# Patient Record
Sex: FEMALE | Race: WHITE | NOT HISPANIC OR LATINO
[De-identification: names, ages, dates, MRNs, and addresses within clinical notes are randomized per-mention and may not be internally consistent; named-entity substitution may affect disease eponyms.]

---

## 2017-01-04 ENCOUNTER — APPOINTMENT (OUTPATIENT)
Dept: PSYCHIATRY | Facility: CLINIC | Age: 62
End: 2017-01-04

## 2020-04-25 ENCOUNTER — MESSAGE (OUTPATIENT)
Age: 65
End: 2020-04-25

## 2022-06-29 ENCOUNTER — OUTPATIENT (OUTPATIENT)
Dept: OUTPATIENT SERVICES | Facility: HOSPITAL | Age: 67
LOS: 1 days | Discharge: HOME | End: 2022-06-29

## 2022-06-29 DIAGNOSIS — C50.919 MALIGNANT NEOPLASM OF UNSPECIFIED SITE OF UNSPECIFIED FEMALE BREAST: ICD-10-CM

## 2022-06-30 ENCOUNTER — APPOINTMENT (OUTPATIENT)
Dept: SURGICAL ONCOLOGY | Facility: AMBULATORY SURGERY CENTER | Age: 67
End: 2022-06-30

## 2022-06-30 ENCOUNTER — APPOINTMENT (OUTPATIENT)
Dept: HEMATOLOGY ONCOLOGY | Facility: CLINIC | Age: 67
End: 2022-06-30

## 2022-06-30 ENCOUNTER — RESULT REVIEW (OUTPATIENT)
Age: 67
End: 2022-06-30

## 2022-06-30 ENCOUNTER — APPOINTMENT (OUTPATIENT)
Dept: SURGICAL ONCOLOGY | Facility: CLINIC | Age: 67
End: 2022-06-30

## 2022-06-30 VITALS
HEIGHT: 64.37 IN | OXYGEN SATURATION: 98 % | DIASTOLIC BLOOD PRESSURE: 71 MMHG | TEMPERATURE: 97 F | BODY MASS INDEX: 29.66 KG/M2 | RESPIRATION RATE: 18 BRPM | SYSTOLIC BLOOD PRESSURE: 104 MMHG | HEART RATE: 107 BPM | WEIGHT: 173.72 LBS

## 2022-06-30 VITALS
RESPIRATION RATE: 16 BRPM | HEIGHT: 64.37 IN | BODY MASS INDEX: 29.53 KG/M2 | HEART RATE: 94 BPM | TEMPERATURE: 97.8 F | SYSTOLIC BLOOD PRESSURE: 113 MMHG | OXYGEN SATURATION: 98 % | WEIGHT: 173 LBS | DIASTOLIC BLOOD PRESSURE: 77 MMHG

## 2022-06-30 DIAGNOSIS — C50.212 MALIGNANT NEOPLASM OF UPPER-INNER QUADRANT OF LEFT FEMALE BREAST: ICD-10-CM

## 2022-06-30 DIAGNOSIS — Z17.1 MALIGNANT NEOPLASM OF UPPER-INNER QUADRANT OF LEFT FEMALE BREAST: ICD-10-CM

## 2022-06-30 LAB
ALBUMIN SERPL ELPH-MCNC: 4.6 G/DL
ALP BLD-CCNC: 111 U/L
ALT SERPL-CCNC: 12 U/L
ANION GAP SERPL CALC-SCNC: 15 MMOL/L
APTT BLD: 32.4 SEC
AST SERPL-CCNC: 17 U/L
BASOPHILS # BLD AUTO: 0.08 K/UL — SIGNIFICANT CHANGE UP (ref 0–0.2)
BASOPHILS NFR BLD AUTO: 1 % — SIGNIFICANT CHANGE UP (ref 0–2)
BILIRUB SERPL-MCNC: 0.4 MG/DL
BUN SERPL-MCNC: 13 MG/DL
CALCIUM SERPL-MCNC: 9.5 MG/DL
CHLORIDE SERPL-SCNC: 104 MMOL/L
CO2 SERPL-SCNC: 23 MMOL/L
CREAT SERPL-MCNC: 1.06 MG/DL
EGFR: 58 ML/MIN/1.73M2
EOSINOPHIL # BLD AUTO: 0.02 K/UL — SIGNIFICANT CHANGE UP (ref 0–0.5)
EOSINOPHIL NFR BLD AUTO: 0.2 % — SIGNIFICANT CHANGE UP (ref 0–6)
GLUCOSE SERPL-MCNC: 109 MG/DL
HCT VFR BLD CALC: 43.5 % — SIGNIFICANT CHANGE UP (ref 34.5–45)
HGB BLD-MCNC: 13.7 G/DL — SIGNIFICANT CHANGE UP (ref 11.5–15.5)
IMM GRANULOCYTES NFR BLD AUTO: 0.6 % — SIGNIFICANT CHANGE UP (ref 0–1.5)
INR PPP: 1.09 RATIO
LYMPHOCYTES # BLD AUTO: 2.02 K/UL — SIGNIFICANT CHANGE UP (ref 1–3.3)
LYMPHOCYTES # BLD AUTO: 24.7 % — SIGNIFICANT CHANGE UP (ref 13–44)
MCHC RBC-ENTMCNC: 27.3 PG — SIGNIFICANT CHANGE UP (ref 27–34)
MCHC RBC-ENTMCNC: 31.5 G/DL — LOW (ref 32–36)
MCV RBC AUTO: 86.8 FL — SIGNIFICANT CHANGE UP (ref 80–100)
MONOCYTES # BLD AUTO: 0.66 K/UL — SIGNIFICANT CHANGE UP (ref 0–0.9)
MONOCYTES NFR BLD AUTO: 8.1 % — SIGNIFICANT CHANGE UP (ref 2–14)
NEUTROPHILS # BLD AUTO: 5.35 K/UL — SIGNIFICANT CHANGE UP (ref 1.8–7.4)
NEUTROPHILS NFR BLD AUTO: 65.4 % — SIGNIFICANT CHANGE UP (ref 43–77)
NRBC # BLD: 0 /100 WBCS — SIGNIFICANT CHANGE UP (ref 0–0)
PLATELET # BLD AUTO: 340 K/UL — SIGNIFICANT CHANGE UP (ref 150–400)
POTASSIUM SERPL-SCNC: 4.8 MMOL/L
PROT SERPL-MCNC: 6.8 G/DL
PT BLD: 12.8 SEC
RBC # BLD: 5.01 M/UL — SIGNIFICANT CHANGE UP (ref 3.8–5.2)
RBC # FLD: 13.8 % — SIGNIFICANT CHANGE UP (ref 10.3–14.5)
SODIUM SERPL-SCNC: 141 MMOL/L
WBC # BLD: 8.18 K/UL — SIGNIFICANT CHANGE UP (ref 3.8–10.5)
WBC # FLD AUTO: 8.18 K/UL — SIGNIFICANT CHANGE UP (ref 3.8–10.5)

## 2022-06-30 PROCEDURE — 99204 OFFICE O/P NEW MOD 45 MIN: CPT

## 2022-06-30 PROCEDURE — 99205 OFFICE O/P NEW HI 60 MIN: CPT

## 2022-06-30 NOTE — HISTORY OF PRESENT ILLNESS
[Disease: _____________________] : Disease: [unfilled] [de-identified] : Please see dictated initial consult note (*d office visit) in [facility] [de-identified] : ER+ AR+ Her 2neu (2+ IHC) - CISH/FISH not done

## 2022-07-05 ENCOUNTER — APPOINTMENT (OUTPATIENT)
Dept: SURGICAL ONCOLOGY | Facility: CLINIC | Age: 67
End: 2022-07-05

## 2022-07-05 ENCOUNTER — RESULT REVIEW (OUTPATIENT)
Age: 67
End: 2022-07-05

## 2022-07-05 VITALS
HEIGHT: 64.37 IN | RESPIRATION RATE: 16 BRPM | SYSTOLIC BLOOD PRESSURE: 122 MMHG | WEIGHT: 176 LBS | BODY MASS INDEX: 30.05 KG/M2 | TEMPERATURE: 97.7 F | OXYGEN SATURATION: 95 % | HEART RATE: 108 BPM | DIASTOLIC BLOOD PRESSURE: 79 MMHG

## 2022-07-05 PROCEDURE — 99214 OFFICE O/P EST MOD 30 MIN: CPT

## 2022-07-05 NOTE — PHYSICAL EXAM
[Normal] : normal conjunctiva, lids, pupils and irises [FreeTextEntry1] : SC present for exam  [de-identified] : Wound is clean, packing removed. Packing no longer needed. Covered with bandage [de-identified] : left breast wound is clean, packing removed.

## 2022-07-05 NOTE — ASSESSMENT
[FreeTextEntry1] : IMP:\par Ulcerated left breast invasive ductal carcinoma\par \par \par PLAN:\par PET/CT and chemotherapy per Dr. Fitzgerald\par RTO in 2 weeks\par \par \par All medical entries were at my, Dr. Renan Phillips, direction.  I have reviewed the chart and agree that the record accurately reflects my personal performance of the history, physical exam, assessment and plan.  Our office nurse practitioner was present for the duration of the office visit.

## 2022-07-05 NOTE — HISTORY OF PRESENT ILLNESS
[de-identified] : 67 year female returns for follow up.  She was initially seen in consultation on 22.  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  She noticed a mass with skin change involving her left breast but initially felt it was an abscess so she did not seek care.  She showed it to a friend who was concerned that it was cancer.  Patient admits that she avoids doctors because her   from pancreatic cancer and she felt he was mistreated by physicians involved in his care, and was thus afraid she would have a similar experience.\par \par She ultimately sought care and had imaging in [place] (reports not currently available), followed by an incisional type biopsy demonstrating a 4 cm invasive ductal carcinoma, with angiolymphatic invasion and extending to the peripheral and deep margins, ER+/DE+/HER2-.\par \par She returned to [place] for treatment and was seen by medical oncologist Dr. Silver Fitzgerald earlier today.  He plans to send her for a PET/CT.  During the visit, he was concerned about bleeding from the site and referred her to me for management.\par \par She denies any significant medical history but suffers from anxiety/depression and is on multiple psychiatric medications.  Her son  a few years ago as well.   She has a family history of breast cancer involving her mother.  She denies alcohol or tobacco use. \par \par 22- Wound bed clean.  Packing replaced. \par \par 2022- wound bed clean. Packing is no longer needed. Wound is covered w/ bandage\par

## 2022-07-06 ENCOUNTER — APPOINTMENT (OUTPATIENT)
Dept: NUCLEAR MEDICINE | Facility: IMAGING CENTER | Age: 67
End: 2022-07-06

## 2022-07-06 LAB — SURGICAL PATHOLOGY STUDY: SIGNIFICANT CHANGE UP

## 2022-07-11 ENCOUNTER — APPOINTMENT (OUTPATIENT)
Dept: NUCLEAR MEDICINE | Facility: IMAGING CENTER | Age: 67
End: 2022-07-11

## 2022-07-11 ENCOUNTER — OUTPATIENT (OUTPATIENT)
Dept: OUTPATIENT SERVICES | Facility: HOSPITAL | Age: 67
LOS: 1 days | End: 2022-07-11
Payer: MEDICARE

## 2022-07-11 DIAGNOSIS — C50.912 MALIGNANT NEOPLASM OF UNSPECIFIED SITE OF LEFT FEMALE BREAST: ICD-10-CM

## 2022-07-11 PROCEDURE — A9552: CPT

## 2022-07-11 PROCEDURE — 78815 PET IMAGE W/CT SKULL-THIGH: CPT

## 2022-07-11 PROCEDURE — 78815 PET IMAGE W/CT SKULL-THIGH: CPT | Mod: 26,PI,MH

## 2022-07-11 NOTE — ASSESSMENT
[FreeTextEntry1] : IMP:\par Ulcerated left breast invasive ductal carcinoma\par Wound bed clean.  It was repacked today \par \par PLAN:\par PET/CT and chemotherapy per Dr. Fitzgerald\par Patient should RTO next week for dressing change\par \par All medical entries were at my, Dr. Renan Phillips, direction.  I have reviewed the chart and agree that the record accurately reflects my personal performance of the history, physical exam, assessment and plan.  Our office nurse practitioner was present for the duration of the office visit.

## 2022-07-11 NOTE — PHYSICAL EXAM
[Normal] : supple, no neck mass and thyroid not enlarged [Normal] : oriented to person, place and time, with appropriate affect [FreeTextEntry1] : AB present during exam  [de-identified] : Normal S1, S2.  Regular rate and rhythm.  [de-identified] : Left breast ulcerated lesion approximately 5 cm at the base and 3 cm at the superficial extent with granulation tissue, no drainage, at the 11:00 axis

## 2022-07-11 NOTE — HISTORY OF PRESENT ILLNESS
[de-identified] : 67 year female presents for an initial consultation.  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  She noticed a mass with skin change involving her left breast but initially felt it was an abscess so she did not seek care.  She showed it to a friend who was concerned that it was cancer.  Patient admits that she avoids doctors because her   from pancreatic cancer and she felt he was mistreated by physicians involved in his care, and was thus afraid she would have a similar experience.\par \par She ultimately sought care and had imaging in [place] (reports not currently available), followed by an incisional type biopsy demonstrating a 4 cm invasive ductal carcinoma, with angiolymphatic invasion and extending to the peripheral and deep margins, ER+/OK+/HER2-.\par \par She returned to [place] for treatment and was seen by medical oncologist Dr. Silver Fitzgerald earlier today.  He plans to send her for a PET/CT.  During the visit, he was concerned about bleeding from the site and referred her to me for management.\par \par She denies any significant medical history but suffers from anxiety/depression and is on multiple psychiatric medications.  Her son  a few years ago as well.   She has a family history of breast cancer involving her mother.  She denies alcohol or tobacco use. \par

## 2022-07-13 ENCOUNTER — TRANSCRIPTION ENCOUNTER (OUTPATIENT)
Age: 67
End: 2022-07-13

## 2022-07-14 ENCOUNTER — NON-APPOINTMENT (OUTPATIENT)
Age: 67
End: 2022-07-14

## 2022-07-18 ENCOUNTER — NON-APPOINTMENT (OUTPATIENT)
Age: 67
End: 2022-07-18

## 2022-07-19 ENCOUNTER — APPOINTMENT (OUTPATIENT)
Dept: SURGICAL ONCOLOGY | Facility: CLINIC | Age: 67
End: 2022-07-19

## 2022-07-19 ENCOUNTER — NON-APPOINTMENT (OUTPATIENT)
Age: 67
End: 2022-07-19

## 2022-07-19 VITALS
SYSTOLIC BLOOD PRESSURE: 115 MMHG | HEART RATE: 103 BPM | OXYGEN SATURATION: 98 % | RESPIRATION RATE: 17 BRPM | BODY MASS INDEX: 29.37 KG/M2 | DIASTOLIC BLOOD PRESSURE: 79 MMHG | HEIGHT: 64 IN | TEMPERATURE: 96.9 F | WEIGHT: 172 LBS

## 2022-07-19 PROCEDURE — 99214 OFFICE O/P EST MOD 30 MIN: CPT

## 2022-07-19 NOTE — HISTORY OF PRESENT ILLNESS
[de-identified] : Ms. Jessenia Adler is a 67 year female returns for follow up.  She was initially seen in consultation on 22.  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  She noticed a mass with skin change involving her left breast but initially felt it was an abscess so she did not seek care.  She showed it to a friend who was concerned that it was cancer.  Patient admits that she avoids doctors because her   from pancreatic cancer and she felt he was mistreated by physicians involved in his care, and was thus afraid she would have a similar experience.\par \par She ultimately sought care and had imaging in [place] (reports not currently available), followed by an incisional type biopsy demonstrating a 4 cm invasive ductal carcinoma, with angiolymphatic invasion and extending to the peripheral and deep margins, ER+/MA+/HER2-.\par \par She returned to [place] for treatment and was seen by medical oncologist Dr. Silver Fitzgerald earlier today.  He plans to send her for a PET/CT.  During the visit, he was concerned about bleeding from the site and referred her to me for management.\par \par She denies any significant medical history but suffers from anxiety/depression and is on multiple psychiatric medications.  Her son  a few years ago as well.   She has a family history of breast cancer involving her mother.  She denies alcohol or tobacco use. \par \par 22- Wound bed clean.  Packing replaced. \par \par 2022- wound bed clean. Packing is no longer needed. Wound is covered w/ bandage\par \par 22- PET SCAN: FDG-avid left breast masses, medial aspect upper left breast, and central aspect left breast, are compatible with malignancy. The mass in the medial aspect of the upper left breast involves the overlying skin and is inseparable from the chest wall musculature. The mass in the central left breast is not well delineated on CT. Further evaluation may be performed with MRI.\par FDG-avid left axillary lymph node metastases.\par FDG-avid mixed lytic and sclerotic bone metastases in axial skeleton and proximal right femur.\par Large hiatal hernia with distended esophagus, without associated hypermetabolism. Please correlate clinically.\par \par 2022- She is scheduled for a CT guided bone biopsy on 22.  She denies any significant bleeding or drainage from the tumor site.\par

## 2022-07-19 NOTE — ASSESSMENT
[FreeTextEntry1] : IMP:\par Ulcerated left breast invasive ductal carcinoma\par Probable bone mets\par \par \par PLAN:\par Continue local wound care\par CT guided bone biopsy 7/22/22\par Follow up with Dr. Fitzgerald for systemic therapy\par If there is determined to be a role for surgery I will be available\par \par \par All medical entries were at my, Dr. Renan Phillips, direction.  I have reviewed the chart and agree that the record accurately reflects my personal performance of the history, physical exam, assessment and plan.  Our office nurse practitioner was present for the duration of the office visit.

## 2022-07-19 NOTE — PHYSICAL EXAM
[Normal Supraclavicular Lymph Nodes] : normal supraclavicular lymph nodes [Normal] : oriented to person, place and time, with appropriate affect [FreeTextEntry1] : AB present for exam  [de-identified] : left breast wound is clean, packing removed. [de-identified] : Wound is clean, packing removed. Packing no longer needed. Covered with bandage

## 2022-07-20 ENCOUNTER — APPOINTMENT (OUTPATIENT)
Dept: INTERVENTIONAL RADIOLOGY/VASCULAR | Facility: CLINIC | Age: 67
End: 2022-07-20

## 2022-07-20 DIAGNOSIS — Z63.4 DISAPPEARANCE AND DEATH OF FAMILY MEMBER: ICD-10-CM

## 2022-07-20 PROCEDURE — 99443: CPT | Mod: 95

## 2022-07-20 SDOH — SOCIAL STABILITY - SOCIAL INSECURITY: DISSAPEARANCE AND DEATH OF FAMILY MEMBER: Z63.4

## 2022-07-20 NOTE — CONSULT LETTER
[Dear  ___] : Dear  [unfilled], [Consult Letter:] : I had the pleasure of evaluating your patient, [unfilled]. [( Thank you for referring [unfilled] for consultation for _____ )] : Thank you for referring [unfilled] for consultation for [unfilled] [Please see my note below.] : Please see my note below. [Consult Closing:] : Thank you very much for allowing me to participate in the care of this patient.  If you have any questions, please do not hesitate to contact me. [Sincerely,] : Sincerely, [FreeTextEntry3] : Lan Szymanski MD, MS\par Vascular & Interventional Radiologist\par [facility]\par

## 2022-07-20 NOTE — REVIEW OF SYSTEMS
[As Noted in HPI] : as noted in HPI [Anxiety] : anxiety [Pelvic Pain] : no pelvic pain [Easy Bleeding] : no tendency for easy bleeding [Easy Bruising] : no tendency for easy bruising

## 2022-07-20 NOTE — HISTORY OF PRESENT ILLNESS
[FreeTextEntry1] : 67-year-old female with PMH significant for left breast cancer, who was found to have left axillary LN metastases as well as osseous metastases on a recent PET-CT. Patient was evaluated by Dr. Fitzgerald, who referred the patient for consultation for possible biopsy of osseous lesion.\par \par Patient does not offer any complaints currently. Denies CP/SOB, abd pain/N/V/changes in bowel habits, fevers/chills/night sweats. Patient denies recent unexpected weight loss.

## 2022-07-20 NOTE — PHYSICAL EXAM
[Alert] : alert [No Acute Distress] : no acute distress [Oriented x3] : oriented to person, place, and time

## 2022-07-20 NOTE — REASON FOR VISIT
[Medical Office: ([facility])___] : at the medical office located in  [Verbal consent obtained from patient] : the patient, [unfilled] [Consultation] : a consultation visit [FreeTextEntry1] : Biopsy of right posterior iliac bone lesion

## 2022-07-20 NOTE — ASSESSMENT
[Other: _____] : [unfilled] [FreeTextEntry1] : 66 yo F with PMH significant for left breast cancer, who was found to have osseous lesions concerning for metastases on a recent PET-CT. Patient was evaluated by Dr. Fitzgerald, who referred the patient for consultation for possible biopsy of osseous lesion.\par \par The procedure (CT guided biopsy of right posterior iliac bone lesion) was discussed with the patient in detail. The benefits, alternatives and risks of the procedure, including but not limited to risks of bleeding and infection were also discussed. All questions were answered and concerns addressed to patient's satisfaction. The patient verbalizes understanding and is agreeable to the procedure.\par \par

## 2022-07-22 ENCOUNTER — RESULT REVIEW (OUTPATIENT)
Age: 67
End: 2022-07-22

## 2022-07-22 ENCOUNTER — OUTPATIENT (OUTPATIENT)
Dept: OUTPATIENT SERVICES | Facility: HOSPITAL | Age: 67
LOS: 1 days | End: 2022-07-22
Payer: MEDICARE

## 2022-07-22 ENCOUNTER — APPOINTMENT (OUTPATIENT)
Dept: INTERVENTIONAL RADIOLOGY/VASCULAR | Facility: HOSPITAL | Age: 67
End: 2022-07-22

## 2022-07-22 ENCOUNTER — TRANSCRIPTION ENCOUNTER (OUTPATIENT)
Age: 67
End: 2022-07-22

## 2022-07-22 VITALS
DIASTOLIC BLOOD PRESSURE: 74 MMHG | RESPIRATION RATE: 16 BRPM | OXYGEN SATURATION: 95 % | SYSTOLIC BLOOD PRESSURE: 118 MMHG | TEMPERATURE: 98 F | HEART RATE: 85 BPM

## 2022-07-22 VITALS
DIASTOLIC BLOOD PRESSURE: 42 MMHG | OXYGEN SATURATION: 97 % | TEMPERATURE: 98 F | SYSTOLIC BLOOD PRESSURE: 116 MMHG | HEART RATE: 85 BPM | RESPIRATION RATE: 12 BRPM

## 2022-07-22 DIAGNOSIS — C50.919 MALIGNANT NEOPLASM OF UNSPECIFIED SITE OF UNSPECIFIED FEMALE BREAST: ICD-10-CM

## 2022-07-22 PROCEDURE — 88311 DECALCIFY TISSUE: CPT

## 2022-07-22 PROCEDURE — 20225 BONE BIOPSY TROCAR/NDL DEEP: CPT

## 2022-07-22 PROCEDURE — 88305 TISSUE EXAM BY PATHOLOGIST: CPT | Mod: 26

## 2022-07-22 PROCEDURE — 77012 CT SCAN FOR NEEDLE BIOPSY: CPT | Mod: 26

## 2022-07-22 PROCEDURE — 77012 CT SCAN FOR NEEDLE BIOPSY: CPT

## 2022-07-22 PROCEDURE — 88311 DECALCIFY TISSUE: CPT | Mod: 26

## 2022-07-22 PROCEDURE — 88305 TISSUE EXAM BY PATHOLOGIST: CPT

## 2022-07-22 RX ORDER — BUPROPION HYDROCHLORIDE 150 MG/1
1 TABLET, EXTENDED RELEASE ORAL
Qty: 0 | Refills: 0 | DISCHARGE

## 2022-07-22 RX ORDER — CLONAZEPAM 1 MG
0 TABLET ORAL
Qty: 0 | Refills: 0 | DISCHARGE

## 2022-07-22 RX ORDER — FENTANYL CITRATE 50 UG/ML
50 INJECTION INTRAVENOUS
Refills: 0 | Status: DISCONTINUED | OUTPATIENT
Start: 2022-07-22 | End: 2022-07-22

## 2022-07-22 RX ORDER — METOCLOPRAMIDE HCL 10 MG
10 TABLET ORAL ONCE
Refills: 0 | Status: DISCONTINUED | OUTPATIENT
Start: 2022-07-22 | End: 2022-07-22

## 2022-07-22 RX ORDER — SODIUM CHLORIDE 9 MG/ML
1000 INJECTION, SOLUTION INTRAVENOUS
Refills: 0 | Status: DISCONTINUED | OUTPATIENT
Start: 2022-07-22 | End: 2022-07-22

## 2022-07-25 ENCOUNTER — APPOINTMENT (OUTPATIENT)
Dept: HEMATOLOGY ONCOLOGY | Facility: CLINIC | Age: 67
End: 2022-07-25

## 2022-07-25 LAB — SARS-COV-2 N GENE NPH QL NAA+PROBE: NOT DETECTED

## 2022-07-26 ENCOUNTER — APPOINTMENT (OUTPATIENT)
Dept: HEMATOLOGY ONCOLOGY | Facility: CLINIC | Age: 67
End: 2022-07-26

## 2022-07-26 ENCOUNTER — APPOINTMENT (OUTPATIENT)
Dept: INTERVENTIONAL RADIOLOGY/VASCULAR | Facility: CLINIC | Age: 67
End: 2022-07-26

## 2022-07-26 ENCOUNTER — RESULT REVIEW (OUTPATIENT)
Age: 67
End: 2022-07-26

## 2022-07-26 ENCOUNTER — NON-APPOINTMENT (OUTPATIENT)
Age: 67
End: 2022-07-26

## 2022-07-26 LAB
ALBUMIN SERPL ELPH-MCNC: 4.3 G/DL
ALP BLD-CCNC: 98 U/L
ALT SERPL-CCNC: 12 U/L
ANION GAP SERPL CALC-SCNC: 12 MMOL/L
AST SERPL-CCNC: 20 U/L
BASOPHILS # BLD AUTO: 0.04 K/UL — SIGNIFICANT CHANGE UP (ref 0–0.2)
BASOPHILS NFR BLD AUTO: 0.7 % — SIGNIFICANT CHANGE UP (ref 0–2)
BILIRUB SERPL-MCNC: 0.4 MG/DL
BUN SERPL-MCNC: 18 MG/DL
CALCIUM SERPL-MCNC: 9.8 MG/DL
CEA SERPL-MCNC: 70.7 NG/ML
CHLORIDE SERPL-SCNC: 104 MMOL/L
CO2 SERPL-SCNC: 26 MMOL/L
CREAT SERPL-MCNC: 0.92 MG/DL
EGFR: 69 ML/MIN/1.73M2
EOSINOPHIL # BLD AUTO: 0.01 K/UL — SIGNIFICANT CHANGE UP (ref 0–0.5)
EOSINOPHIL NFR BLD AUTO: 0.2 % — SIGNIFICANT CHANGE UP (ref 0–6)
GLUCOSE SERPL-MCNC: 100 MG/DL
HCT VFR BLD CALC: 41.7 % — SIGNIFICANT CHANGE UP (ref 34.5–45)
HGB BLD-MCNC: 13.4 G/DL — SIGNIFICANT CHANGE UP (ref 11.5–15.5)
IMM GRANULOCYTES NFR BLD AUTO: 0.3 % — SIGNIFICANT CHANGE UP (ref 0–1.5)
LYMPHOCYTES # BLD AUTO: 2.2 K/UL — SIGNIFICANT CHANGE UP (ref 1–3.3)
LYMPHOCYTES # BLD AUTO: 36.2 % — SIGNIFICANT CHANGE UP (ref 13–44)
MCHC RBC-ENTMCNC: 27.3 PG — SIGNIFICANT CHANGE UP (ref 27–34)
MCHC RBC-ENTMCNC: 32.1 G/DL — SIGNIFICANT CHANGE UP (ref 32–36)
MCV RBC AUTO: 85.1 FL — SIGNIFICANT CHANGE UP (ref 80–100)
MONOCYTES # BLD AUTO: 0.48 K/UL — SIGNIFICANT CHANGE UP (ref 0–0.9)
MONOCYTES NFR BLD AUTO: 7.9 % — SIGNIFICANT CHANGE UP (ref 2–14)
NEUTROPHILS # BLD AUTO: 3.32 K/UL — SIGNIFICANT CHANGE UP (ref 1.8–7.4)
NEUTROPHILS NFR BLD AUTO: 54.7 % — SIGNIFICANT CHANGE UP (ref 43–77)
NRBC # BLD: 0 /100 WBCS — SIGNIFICANT CHANGE UP (ref 0–0)
PLATELET # BLD AUTO: 300 K/UL — SIGNIFICANT CHANGE UP (ref 150–400)
POTASSIUM SERPL-SCNC: 5.1 MMOL/L
PROT SERPL-MCNC: 6.5 G/DL
RBC # BLD: 4.9 M/UL — SIGNIFICANT CHANGE UP (ref 3.8–5.2)
RBC # FLD: 13.9 % — SIGNIFICANT CHANGE UP (ref 10.3–14.5)
SODIUM SERPL-SCNC: 143 MMOL/L
WBC # BLD: 6.07 K/UL — SIGNIFICANT CHANGE UP (ref 3.8–10.5)
WBC # FLD AUTO: 6.07 K/UL — SIGNIFICANT CHANGE UP (ref 3.8–10.5)

## 2022-07-28 LAB — CANCER AG27-29 SERPL-ACNC: 144.5 U/ML

## 2022-08-08 ENCOUNTER — NON-APPOINTMENT (OUTPATIENT)
Age: 67
End: 2022-08-08

## 2022-08-11 ENCOUNTER — RESULT REVIEW (OUTPATIENT)
Age: 67
End: 2022-08-11

## 2022-08-11 ENCOUNTER — APPOINTMENT (OUTPATIENT)
Dept: HEMATOLOGY ONCOLOGY | Facility: CLINIC | Age: 67
End: 2022-08-11

## 2022-08-11 VITALS
RESPIRATION RATE: 17 BRPM | WEIGHT: 174.8 LBS | DIASTOLIC BLOOD PRESSURE: 78 MMHG | BODY MASS INDEX: 29.84 KG/M2 | TEMPERATURE: 97 F | HEART RATE: 122 BPM | HEIGHT: 64 IN | SYSTOLIC BLOOD PRESSURE: 110 MMHG | OXYGEN SATURATION: 98 %

## 2022-08-11 LAB
BASOPHILS # BLD AUTO: 0.01 K/UL — SIGNIFICANT CHANGE UP (ref 0–0.2)
BASOPHILS NFR BLD AUTO: 0.3 % — SIGNIFICANT CHANGE UP (ref 0–2)
EOSINOPHIL # BLD AUTO: 0.01 K/UL — SIGNIFICANT CHANGE UP (ref 0–0.5)
EOSINOPHIL NFR BLD AUTO: 0.3 % — SIGNIFICANT CHANGE UP (ref 0–6)
HCT VFR BLD CALC: 40.2 % — SIGNIFICANT CHANGE UP (ref 34.5–45)
HGB BLD-MCNC: 12.7 G/DL — SIGNIFICANT CHANGE UP (ref 11.5–15.5)
IMM GRANULOCYTES NFR BLD AUTO: 0 % — SIGNIFICANT CHANGE UP (ref 0–1.5)
LYMPHOCYTES # BLD AUTO: 1.26 K/UL — SIGNIFICANT CHANGE UP (ref 1–3.3)
LYMPHOCYTES # BLD AUTO: 37.4 % — SIGNIFICANT CHANGE UP (ref 13–44)
MCHC RBC-ENTMCNC: 28.1 PG — SIGNIFICANT CHANGE UP (ref 27–34)
MCHC RBC-ENTMCNC: 31.6 G/DL — LOW (ref 32–36)
MCV RBC AUTO: 88.9 FL — SIGNIFICANT CHANGE UP (ref 80–100)
MONOCYTES # BLD AUTO: 0.25 K/UL — SIGNIFICANT CHANGE UP (ref 0–0.9)
MONOCYTES NFR BLD AUTO: 7.4 % — SIGNIFICANT CHANGE UP (ref 2–14)
NEUTROPHILS # BLD AUTO: 1.84 K/UL — SIGNIFICANT CHANGE UP (ref 1.8–7.4)
NEUTROPHILS NFR BLD AUTO: 54.6 % — SIGNIFICANT CHANGE UP (ref 43–77)
NRBC # BLD: 0 /100 WBCS — SIGNIFICANT CHANGE UP (ref 0–0)
PLATELET # BLD AUTO: 159 K/UL — SIGNIFICANT CHANGE UP (ref 150–400)
RBC # BLD: 4.52 M/UL — SIGNIFICANT CHANGE UP (ref 3.8–5.2)
RBC # FLD: 14.6 % — HIGH (ref 10.3–14.5)
WBC # BLD: 3.37 K/UL — LOW (ref 3.8–10.5)
WBC # FLD AUTO: 3.37 K/UL — LOW (ref 3.8–10.5)

## 2022-08-11 PROCEDURE — 99215 OFFICE O/P EST HI 40 MIN: CPT

## 2022-08-11 RX ORDER — VENLAFAXINE HYDROCHLORIDE 75 MG/1
75 CAPSULE, EXTENDED RELEASE ORAL
Qty: 60 | Refills: 0 | Status: COMPLETED | COMMUNITY
Start: 2022-02-23

## 2022-08-11 RX ORDER — FLUOXETINE HYDROCHLORIDE 20 MG/1
20 CAPSULE ORAL
Qty: 30 | Refills: 0 | Status: DISCONTINUED | COMMUNITY
Start: 2022-04-18

## 2022-08-11 RX ORDER — DOXEPIN HYDROCHLORIDE 25 MG/1
25 CAPSULE ORAL
Qty: 30 | Refills: 0 | Status: DISCONTINUED | COMMUNITY
Start: 2022-03-22

## 2022-08-11 RX ORDER — MIRTAZAPINE 30 MG/1
30 TABLET, FILM COATED ORAL
Qty: 30 | Refills: 0 | Status: DISCONTINUED | COMMUNITY
Start: 2022-02-23

## 2022-08-11 RX ORDER — CHLORHEXIDINE GLUCONATE, 0.12% ORAL RINSE 1.2 MG/ML
0.12 SOLUTION DENTAL
Qty: 473 | Refills: 0 | Status: DISCONTINUED | COMMUNITY
Start: 2022-06-02

## 2022-08-11 RX ORDER — ALPRAZOLAM 0.5 MG/1
0.5 TABLET ORAL
Qty: 1 | Refills: 0 | Status: DISCONTINUED | COMMUNITY
Start: 2022-07-05 | End: 2022-08-11

## 2022-08-11 RX ORDER — HYDROCODONE BITARTRATE AND ACETAMINOPHEN 5; 325 MG/1; MG/1
5-325 TABLET ORAL
Qty: 15 | Refills: 0 | Status: DISCONTINUED | COMMUNITY
Start: 2022-06-21

## 2022-08-11 RX ORDER — TRAZODONE HYDROCHLORIDE 150 MG/1
150 TABLET ORAL
Qty: 45 | Refills: 0 | Status: DISCONTINUED | COMMUNITY
Start: 2022-01-12

## 2022-08-11 RX ORDER — AMOXICILLIN AND CLAVULANATE POTASSIUM 875; 125 MG/1; MG/1
875-125 TABLET, COATED ORAL
Qty: 14 | Refills: 0 | Status: COMPLETED | COMMUNITY
Start: 2022-04-20

## 2022-08-11 RX ORDER — ARIPIPRAZOLE 2 MG/1
2 TABLET ORAL
Qty: 30 | Refills: 0 | Status: DISCONTINUED | COMMUNITY
Start: 2022-07-14

## 2022-08-11 RX ORDER — ARIPIPRAZOLE 5 MG/1
5 TABLET ORAL
Qty: 30 | Refills: 0 | Status: DISCONTINUED | COMMUNITY
Start: 2022-01-12

## 2022-08-11 RX ORDER — ZOLPIDEM TARTRATE 5 MG/1
5 TABLET ORAL
Qty: 10 | Refills: 0 | Status: ACTIVE | COMMUNITY
Start: 2022-04-18

## 2022-08-11 RX ORDER — CLINDAMYCIN HYDROCHLORIDE 300 MG/1
300 CAPSULE ORAL
Qty: 40 | Refills: 0 | Status: DISCONTINUED | COMMUNITY
Start: 2022-06-02

## 2022-08-11 NOTE — HISTORY OF PRESENT ILLNESS
[de-identified] : See consult dictated note for complete history.\par \par In the interim the patient had PET/CT with showed metastatic disease of the bones.  The patient was started on Anastrozole and palbociclib on 7/25/22. [de-identified] : The patient presents for follow up.  Started anastrozole and palbociclib on 7/25/22.\par \par Currently on day #15, however she missed yesterday's dose because she thought she had to start her off week.  \par \par She denies any treatment related side effects. \par \par She has been on a strict diet, cutting out sugar, caffeine, meats and dairy because she read on internet that these cause cancer.   She spoke to nutritionist and she was advised on proper diet.  She is still hesitant.  \par \par The patient has history of depression, which she is followed by a psychiatrist who manages her medications.  She is also experiencing anxiety regarding her diagnosis. \par \par She c/o occasional bilateral hip pain when getting up after sitting long periods of time.  Pain resolves after moving.\par \par She denies any other complaints.  She notes a good appetite, patient states she is hungry because not eating, stable weight and excellent performance status. \par \par She will be moving in with her niece who lives locally.  She plans on going back to [place] one weekend a month to visit family and her dog.  \par \par PET/CT scan, 7/11/22- IMPRESSION: Abnormal FDG-PET/CT scan.\par \par 1. FDG-avid left breast masses, medial aspect upper left breast, and central aspect left breast, are compatible with malignancy. The mass in the medial aspect of the upper left breast involves the overlying skin and is inseparable from the chest wall musculature. The mass in the central left breast is not well delineated on CT. Further evaluation may be performed with MRI.\par 2. FDG-avid left axillary lymph node metastases.\par 3. FDG-avid mixed lytic and sclerotic bone metastases in axial skeleton and proximal right femur.\par 4. Large hiatal hernia with distended esophagus, without associated hypermetabolism. Please correlate clinically.\par \par

## 2022-08-11 NOTE — PHYSICAL EXAM
[Normal] : affect appropriate [de-identified] : Left breast with fungating lesion upper central breast with about 3.5 cm x 2.5 cm opening.  Area was cleaned and clean gauze applied. Palpable matted left LAD.  Right breast with no discrete palpable axillary nodes, no palpable axillary nodes. [de-identified] : teary at times.

## 2022-08-12 LAB
ALBUMIN SERPL ELPH-MCNC: 4.2 G/DL
ALP BLD-CCNC: 88 U/L
ALT SERPL-CCNC: 22 U/L
ANION GAP SERPL CALC-SCNC: 10 MMOL/L
AST SERPL-CCNC: 17 U/L
BILIRUB SERPL-MCNC: 0.3 MG/DL
BUN SERPL-MCNC: 13 MG/DL
CALCIUM SERPL-MCNC: 9.1 MG/DL
CHLORIDE SERPL-SCNC: 107 MMOL/L
CO2 SERPL-SCNC: 26 MMOL/L
CREAT SERPL-MCNC: 0.77 MG/DL
EGFR: 85 ML/MIN/1.73M2
GLUCOSE SERPL-MCNC: 86 MG/DL
POTASSIUM SERPL-SCNC: 4.8 MMOL/L
PROT SERPL-MCNC: 6.2 G/DL
SODIUM SERPL-SCNC: 142 MMOL/L

## 2022-08-22 ENCOUNTER — NON-APPOINTMENT (OUTPATIENT)
Age: 67
End: 2022-08-22

## 2022-08-23 ENCOUNTER — RESULT REVIEW (OUTPATIENT)
Age: 67
End: 2022-08-23

## 2022-08-23 ENCOUNTER — APPOINTMENT (OUTPATIENT)
Dept: WOUND CARE | Facility: CLINIC | Age: 67
End: 2022-08-23

## 2022-08-23 ENCOUNTER — APPOINTMENT (OUTPATIENT)
Dept: HEMATOLOGY ONCOLOGY | Facility: CLINIC | Age: 67
End: 2022-08-23

## 2022-08-23 VITALS
SYSTOLIC BLOOD PRESSURE: 112 MMHG | RESPIRATION RATE: 16 BRPM | OXYGEN SATURATION: 95 % | DIASTOLIC BLOOD PRESSURE: 77 MMHG | HEIGHT: 64 IN | TEMPERATURE: 97 F | BODY MASS INDEX: 29.36 KG/M2 | WEIGHT: 171.96 LBS | HEART RATE: 139 BPM

## 2022-08-23 VITALS — BODY MASS INDEX: 29.52 KG/M2 | HEIGHT: 64 IN

## 2022-08-23 DIAGNOSIS — M89.8X9 OTHER SPECIFIED DISORDERS OF BONE, UNSPECIFIED SITE: ICD-10-CM

## 2022-08-23 DIAGNOSIS — Z83.49 FAMILY HISTORY OF OTHER ENDOCRINE, NUTRITIONAL AND METABOLIC DISEASES: ICD-10-CM

## 2022-08-23 DIAGNOSIS — F43.21 ADJUSTMENT DISORDER WITH DEPRESSED MOOD: ICD-10-CM

## 2022-08-23 DIAGNOSIS — H26.9 UNSPECIFIED CATARACT: ICD-10-CM

## 2022-08-23 LAB
BASOPHILS # BLD AUTO: 0.02 K/UL — SIGNIFICANT CHANGE UP (ref 0–0.2)
BASOPHILS NFR BLD AUTO: 0.5 % — SIGNIFICANT CHANGE UP (ref 0–2)
EOSINOPHIL # BLD AUTO: 0.01 K/UL — SIGNIFICANT CHANGE UP (ref 0–0.5)
EOSINOPHIL NFR BLD AUTO: 0.3 % — SIGNIFICANT CHANGE UP (ref 0–6)
HCT VFR BLD CALC: 40.8 % — SIGNIFICANT CHANGE UP (ref 34.5–45)
HGB BLD-MCNC: 13.1 G/DL — SIGNIFICANT CHANGE UP (ref 11.5–15.5)
IMM GRANULOCYTES NFR BLD AUTO: 0 % — SIGNIFICANT CHANGE UP (ref 0–1.5)
LYMPHOCYTES # BLD AUTO: 1.81 K/UL — SIGNIFICANT CHANGE UP (ref 1–3.3)
LYMPHOCYTES # BLD AUTO: 46.9 % — HIGH (ref 13–44)
MCHC RBC-ENTMCNC: 29.3 PG — SIGNIFICANT CHANGE UP (ref 27–34)
MCHC RBC-ENTMCNC: 32.1 G/DL — SIGNIFICANT CHANGE UP (ref 32–36)
MCV RBC AUTO: 91.3 FL — SIGNIFICANT CHANGE UP (ref 80–100)
MONOCYTES # BLD AUTO: 0.46 K/UL — SIGNIFICANT CHANGE UP (ref 0–0.9)
MONOCYTES NFR BLD AUTO: 11.9 % — SIGNIFICANT CHANGE UP (ref 2–14)
NEUTROPHILS # BLD AUTO: 1.56 K/UL — LOW (ref 1.8–7.4)
NEUTROPHILS NFR BLD AUTO: 40.4 % — LOW (ref 43–77)
NRBC # BLD: 0 /100 WBCS — SIGNIFICANT CHANGE UP (ref 0–0)
PLATELET # BLD AUTO: 299 K/UL — SIGNIFICANT CHANGE UP (ref 150–400)
RBC # BLD: 4.47 M/UL — SIGNIFICANT CHANGE UP (ref 3.8–5.2)
RBC # FLD: 16.8 % — HIGH (ref 10.3–14.5)
WBC # BLD: 3.86 K/UL — SIGNIFICANT CHANGE UP (ref 3.8–10.5)
WBC # FLD AUTO: 3.86 K/UL — SIGNIFICANT CHANGE UP (ref 3.8–10.5)

## 2022-08-23 PROCEDURE — 99214 OFFICE O/P EST MOD 30 MIN: CPT

## 2022-08-23 PROCEDURE — 99204 OFFICE O/P NEW MOD 45 MIN: CPT

## 2022-08-23 NOTE — HISTORY OF PRESENT ILLNESS
[de-identified] : See consult dictated note for complete history.\par \par In the interim the patient had PET/CT with showed metastatic disease of the bones.  The patient was started on Anastrozole and palbociclib on 7/25/22. [de-identified] : The patient presents for follow up.  Started anastrozole and palbociclib on 7/25/22.\par \par Due to start C2 D1 today.  \par \par She denies any treatment related side effects. \par \par The patient has history of depression, which she is followed by a psychiatrist who manages her medications.  The patient states she is not taking her medications regularly.  The importance of taking her medication regularly in managing her depression was discussed and patient expressed understanding. \par \par She c/o occasional bilateral hip pain when getting up after sitting long periods of time, stable.  Pain resolves after moving.\par \par She denies any other complaints.  She notes a good appetite, stable weight and excellent performance status. She states she has been walking 1 mile/day.\par \par She movied in with her niece who lives locally.  She plans on going back to [place] one weekend a month to visit family and her dog.  \par \par PET/CT scan, 7/11/22- IMPRESSION: Abnormal FDG-PET/CT scan.\par \par 1. FDG-avid left breast masses, medial aspect upper left breast, and central aspect left breast, are compatible with malignancy. The mass in the medial aspect of the upper left breast involves the overlying skin and is inseparable from the chest wall musculature. The mass in the central left breast is not well delineated on CT. Further evaluation may be performed with MRI.\par 2. FDG-avid left axillary lymph node metastases.\par 3. FDG-avid mixed lytic and sclerotic bone metastases in axial skeleton and proximal right femur.\par 4. Large hiatal hernia with distended esophagus, without associated hypermetabolism. Please correlate clinically.\par \par

## 2022-08-23 NOTE — PHYSICAL EXAM
[Normal] : affect appropriate [de-identified] : Left breast with fungating lesion upper central breast, previous openings have closed. Some oozing noted from wound, clean gauze was applied.  Palpable matted left LAD.  Right breast with no discrete palpable axillary nodes, no palpable axillary nodes. [de-identified] : teary at times.

## 2022-08-24 LAB
ALBUMIN SERPL ELPH-MCNC: 4.4 G/DL
ALP BLD-CCNC: 98 U/L
ALT SERPL-CCNC: 18 U/L
ANION GAP SERPL CALC-SCNC: 13 MMOL/L
AST SERPL-CCNC: 18 U/L
BILIRUB SERPL-MCNC: 0.2 MG/DL
BUN SERPL-MCNC: 18 MG/DL
CALCIUM SERPL-MCNC: 9.5 MG/DL
CEA SERPL-MCNC: 84.8 NG/ML
CHLORIDE SERPL-SCNC: 105 MMOL/L
CO2 SERPL-SCNC: 24 MMOL/L
CREAT SERPL-MCNC: 0.85 MG/DL
EGFR: 76 ML/MIN/1.73M2
GLUCOSE SERPL-MCNC: 86 MG/DL
POTASSIUM SERPL-SCNC: 4.5 MMOL/L
PROT SERPL-MCNC: 6.4 G/DL
SODIUM SERPL-SCNC: 142 MMOL/L

## 2022-08-25 LAB — CANCER AG27-29 SERPL-ACNC: 94.4 U/ML

## 2022-09-01 ENCOUNTER — OUTPATIENT (OUTPATIENT)
Dept: OUTPATIENT SERVICES | Facility: HOSPITAL | Age: 67
LOS: 1 days | Discharge: HOME | End: 2022-09-01

## 2022-09-01 DIAGNOSIS — C50.919 MALIGNANT NEOPLASM OF UNSPECIFIED SITE OF UNSPECIFIED FEMALE BREAST: ICD-10-CM

## 2022-09-01 PROBLEM — M89.8X9 BONE MASS: Status: ACTIVE | Noted: 2022-07-20

## 2022-09-01 NOTE — HISTORY OF PRESENT ILLNESS
[FreeTextEntry1] : location  left breast mass with ulceration\par severity no fever , on therapy\par timing/duration months, postponed treatment during covid\par quality some oozing, drainage no pain\par other on rx awaiting  radiation, has met to bones\par  PET/CT with showed metastatic disease of the bones. The patient was started on Anastrozole and palbociclib on 7/25/22.  Started anastrozole and palbociclib on 7/25/22.\par Due to start C2 D1 today.  \par \par   history of depression, which she is followed by a psychiatrist who manages her medications. \par  occasional bilateral hip pain when getting up after sitting long periods of time, stable. Pain resolves after moving.\par   1. FDG-avid left breast masses, medial aspect upper left breast, and central aspect left breast, are compatible with malignancy. The mass in the medial aspect of the upper left breast involves the overlying skin and is inseparable from the chest wall musculature. The mass in the central left breast is not well delineated on CT. Further evaluation may be performed with MRI.\par 2. FDG-avid left axillary lymph node metastases.\par 3. FDG-avid mixed lytic and sclerotic bone metastases in axial skeleton and proximal right femur.\par 4. Large hiatal hernia with distended esophagus, without associated hypermetabolism. Please correlate clinically.\par \par

## 2022-09-01 NOTE — PHYSICAL EXAM
[Normal Breath Sounds] : Normal breath sounds [JVD] : no jugular venous distention  [Skin Ulcer] : ulcer [Oriented to Person] : oriented to person [Oriented to Place] : oriented to place [Oriented to Time] : oriented to time [Calm] : calm [de-identified] : nad [de-identified] : brandon [de-identified] : left breast ulcer [FreeTextEntry1] : no wrist edema [FreeTextEntry2] : non palp axilla [de-identified] : nl [de-identified] : intact rom [Please See PDF for [employer] Analytics] : Please See PDF for [employer].

## 2022-09-01 NOTE — ASSESSMENT
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 6/2022, er /pr+ ki67 favorable, her2-\par  met stage 4 breast cancer/ 2010 tahbsa ovaries\par on anastrazole, ibrance, awaiting radiation eval\par \par  datacomplexity- mod  lab, xr, old rec, test resultsreview,, visualize imagecptreview previous\par risk -mod surgery\par prognosis- stage 4 \par assess-advance directive- lost  911died in 2015\par  and son in war, very sad, not suicidal, on meds\par  \par  \par activity- nl\par  rec: 1-pain- controlled, 2-smell- none 3-drainage- supplies ordered, 4-bleeding- minimal,\par  5- size- palpable mass, 6- itch- none\par education discussed wound care\par discussed nutrition\par no cellulitis currently\par supplies ordered foam/ alginate\par staying at friend home in [place]  , lives in [place] will travel and return for reassessment\par

## 2022-09-06 ENCOUNTER — RESULT REVIEW (OUTPATIENT)
Age: 67
End: 2022-09-06

## 2022-09-06 ENCOUNTER — APPOINTMENT (OUTPATIENT)
Dept: RADIATION ONCOLOGY | Facility: CLINIC | Age: 67
End: 2022-09-06

## 2022-09-06 ENCOUNTER — APPOINTMENT (OUTPATIENT)
Dept: HEMATOLOGY ONCOLOGY | Facility: CLINIC | Age: 67
End: 2022-09-06

## 2022-09-06 ENCOUNTER — NON-APPOINTMENT (OUTPATIENT)
Age: 67
End: 2022-09-06

## 2022-09-06 VITALS
RESPIRATION RATE: 16 BRPM | DIASTOLIC BLOOD PRESSURE: 84 MMHG | OXYGEN SATURATION: 97 % | SYSTOLIC BLOOD PRESSURE: 125 MMHG

## 2022-09-06 LAB
ALBUMIN SERPL ELPH-MCNC: 4.3 G/DL
ALP BLD-CCNC: 93 U/L
ALT SERPL-CCNC: 21 U/L
ANION GAP SERPL CALC-SCNC: 12 MMOL/L
AST SERPL-CCNC: 21 U/L
BASOPHILS # BLD AUTO: 0.04 K/UL — SIGNIFICANT CHANGE UP (ref 0–0.2)
BASOPHILS NFR BLD AUTO: 1.4 % — SIGNIFICANT CHANGE UP (ref 0–2)
BILIRUB SERPL-MCNC: 0.2 MG/DL
BUN SERPL-MCNC: 15 MG/DL
CALCIUM SERPL-MCNC: 9.4 MG/DL
CHLORIDE SERPL-SCNC: 109 MMOL/L
CO2 SERPL-SCNC: 25 MMOL/L
CREAT SERPL-MCNC: 0.98 MG/DL
EGFR: 64 ML/MIN/1.73M2
EOSINOPHIL # BLD AUTO: 0.01 K/UL — SIGNIFICANT CHANGE UP (ref 0–0.5)
EOSINOPHIL NFR BLD AUTO: 0.4 % — SIGNIFICANT CHANGE UP (ref 0–6)
GLUCOSE SERPL-MCNC: 104 MG/DL
HCT VFR BLD CALC: 42.8 % — SIGNIFICANT CHANGE UP (ref 34.5–45)
HGB BLD-MCNC: 13.7 G/DL — SIGNIFICANT CHANGE UP (ref 11.5–15.5)
IMM GRANULOCYTES NFR BLD AUTO: 0.4 % — SIGNIFICANT CHANGE UP (ref 0–1.5)
LYMPHOCYTES # BLD AUTO: 1.44 K/UL — SIGNIFICANT CHANGE UP (ref 1–3.3)
LYMPHOCYTES # BLD AUTO: 52 % — HIGH (ref 13–44)
MCHC RBC-ENTMCNC: 29.8 PG — SIGNIFICANT CHANGE UP (ref 27–34)
MCHC RBC-ENTMCNC: 32 G/DL — SIGNIFICANT CHANGE UP (ref 32–36)
MCV RBC AUTO: 93.2 FL — SIGNIFICANT CHANGE UP (ref 80–100)
MONOCYTES # BLD AUTO: 0.2 K/UL — SIGNIFICANT CHANGE UP (ref 0–0.9)
MONOCYTES NFR BLD AUTO: 7.2 % — SIGNIFICANT CHANGE UP (ref 2–14)
NEUTROPHILS # BLD AUTO: 1.07 K/UL — LOW (ref 1.8–7.4)
NEUTROPHILS NFR BLD AUTO: 38.6 % — LOW (ref 43–77)
NRBC # BLD: 0 /100 WBCS — SIGNIFICANT CHANGE UP (ref 0–0)
PLATELET # BLD AUTO: 282 K/UL — SIGNIFICANT CHANGE UP (ref 150–400)
POTASSIUM SERPL-SCNC: 4.1 MMOL/L
PROT SERPL-MCNC: 6.2 G/DL
RBC # BLD: 4.59 M/UL — SIGNIFICANT CHANGE UP (ref 3.8–5.2)
RBC # FLD: 18 % — HIGH (ref 10.3–14.5)
SODIUM SERPL-SCNC: 146 MMOL/L
WBC # BLD: 2.77 K/UL — LOW (ref 3.8–10.5)
WBC # FLD AUTO: 2.77 K/UL — LOW (ref 3.8–10.5)

## 2022-09-06 PROCEDURE — 99204 OFFICE O/P NEW MOD 45 MIN: CPT | Mod: 25

## 2022-09-06 NOTE — LETTER CLOSING
[Consult Closing:] : Thank you for allowing me to participate in the care of this patient.  If you have any questions, please do not hesitate to contact me. [Sincerely yours,] : Sincerely yours, [FreeTextEntry3] : Kerrie Romero MD\par

## 2022-09-06 NOTE — VITALS
[Maximal Pain Intensity: 0/10] : 0/10 [80: Normal activity with effort; some signs or symptoms of disease.] : 80: Normal activity with effort; some signs or symptoms of disease.  [5 - Distress Level] : Distress Level: 5

## 2022-09-06 NOTE — HISTORY OF PRESENT ILLNESS
[FreeTextEntry1] : Ms. Adler is a 66 yo female with metastatic breast cancer with recent PET CT showing metastases to the bones.  She was started on systemic therapy with Ibrance and anastrazole, and presents for palliative RT consultation.\par \par She first noticed a mass in the left breast around five years ago. She attributed it to a cyst, as she was known to have cystic breasts. The mass gradually increased in size and started to bleed.  She ultimately sought care and had imaging in [place]. The initial exam showed a 3 x 4 ulcerated area on the left breast at 12-1:00, fixed to the chest wall. There were two smaller masses at 4:00 and no palpable axillary adenopathy. She was brought to the OR on 22 where an incisional biopsy was preformed. Pathology showed poorly differentiated invasive ductal carcinoma measuring up to 4 cm. Angiolymphatic invasion was present. The carcinoma extended to the peripheral and deep margins, to the epidermis and was associated with ulceration. ER positive 99%, WV positive 94%, HER2 negative, Ki 67 8%. \par \par Mammography on 22 showed no additional suspicious masses in the left breast other than the grossly apparent tumor. Breast ultrasound showed the left breast tumor, no satellite lesions and an enlarged left axillary lymph node measuring 1.6 x 1.4 x 1.6 cm with a thickened cortex measuring 6 mm. \par \par She returned to [place] for treatment and was seen by medical oncologist Dr. Silver Fitzgerald and Dr. Phillips.\par \par PET CT 7/15/22 showed FDG-avid left breast masses, medial aspect upper left breast and central aspect left breast are compatible with malignancy. FDG avid left axillary lymph node metastases. FDG avid mixed lytic and sclerotic bone mets in axial skeleton and proximal right femur.  Sclerotic lesions involving the cervical, thoracic and lumbar spine, bilateral pelvis, sternum, several ribs and proximal right femur, including right intertrochanteric region and right subtrochanteric region. \par \par She had CT guided bone biopsy of mass on right posterior iliac bone on 22. Surgical pathology showed metastatic adenocarcinoma invading the bone, morphologically compatible with patient's known primary breast carcinoma. ER 80-85% positive, WV 65-75% positive, HER2 negative. \par  \par She was receiving breast wound care weekly. Left breast packing removed from ulcerated wound on 22. She continues on Ibrance and anastrazole. She noticed that since starting treatment, the breast mass has gotten smaller and the bleeding seems to be subsiding. She has not had any pain from the osseous tumors. She has been walking 3 miles a day. \par \par She lives in [place] and has an old [facility] home there. She reports that it requires a lot of care and she plans to return there in about two week. She is  - in  her   from cancer attributed to . Her older son  reportedly due to alcoholism in 2016. Since that time, she has been treated for depression.\par

## 2022-09-06 NOTE — PHYSICAL EXAM
[General Appearance - Well Developed] : well developed [Sclera] : the sclera and conjunctiva were normal [Outer Ear] : the ears and nose were normal in appearance [Normal] : supple with no thyromegaly or masses appreciated [] : no rash [No Focal Deficits] : no focal deficits [Oriented To Time, Place, And Person] : oriented to person, place, and time [de-identified] : Left breast with fungating lesion upper central breast, moist, no active drainage

## 2022-09-13 ENCOUNTER — APPOINTMENT (OUTPATIENT)
Dept: WOUND CARE | Facility: CLINIC | Age: 67
End: 2022-09-13

## 2022-09-13 VITALS
SYSTOLIC BLOOD PRESSURE: 118 MMHG | HEART RATE: 103 BPM | HEIGHT: 64 IN | BODY MASS INDEX: 29.19 KG/M2 | DIASTOLIC BLOOD PRESSURE: 76 MMHG | WEIGHT: 171 LBS

## 2022-09-13 VITALS — TEMPERATURE: 98 F

## 2022-09-13 PROCEDURE — 99213 OFFICE O/P EST LOW 20 MIN: CPT

## 2022-09-15 NOTE — PHYSICAL EXAM
[Normal Breath Sounds] : Normal breath sounds [Skin Ulcer] : ulcer [Oriented to Person] : oriented to person [Oriented to Place] : oriented to place [Oriented to Time] : oriented to time [Calm] : calm [Please See PDF for [employer] Analytics] : Please See PDF for [employer]. [JVD] : no jugular venous distention  [de-identified] : nad [de-identified] : brandon [de-identified] : left breast ulcer [FreeTextEntry1] : no wrist edema [FreeTextEntry2] : non palp axilla [de-identified] : nl [de-identified] : intact rom

## 2022-09-15 NOTE — DATA REVIEWED
[FreeTextEntry1] : \par EXAM: [id number] - PETCT SKUL-THI ONC FDG INIT - ORDERED BY: AMITA GONSALVES\par PROCEDURE DATE: 07/11/2022\par INTERPRETATION: PROCEDURE: PET/CT SKULL BASE-MID THIGH IMAGING\par CLINICAL INFORMATION: 67-year-old female referred for evaluation of locally advanced left breast cancer, upper inner left breast (ER/AZ positive) PET/CT is done as part of the initial treatment strategy evaluation.\par RADIOPHARMACEUTICAL: 12.93 mCi F-18, FDG, I.V.\par \par TECHNIQUE: Fasting blood sugar measured prior to injection of radiopharmaceutical was 82 mg/dl. Following intravenous injection of the radiopharmaceutical and an uptake period of approximately 70 minutes, FDG-PET/CT was obtained on a [employer] Discovery 710 from skull base to mid thigh. Oral contrast was given during the uptake period. CT was performed during shallow respiration. The CT protocol was optimized for PET attenuation correction and anatomic localization. The CT protocol was not designed to produce and cannot replace state-of-the-art diagnostic CT images with specific imaging protocols for different body parts and indications. Images were reviewed on a dedicated workstation using multiplanar reconstruction.\par \par The standardized uptake values (SUV) are normalized to patient body weight and indicate the highest activity concentration (SUVmax) in a given disease site. All image numbers refer to axial image number.\par \par COMPARISON: No prior PET/CT.\par OTHER STUDIES USED FOR CORRELATION: None available.\par \par FINDINGS:\par HEAD/NECK: Physiologic FDG activity in visualized brain, head, and neck.\par CHEST: There is a an FDG-avid mass in the medial aspect of the upper left breast which measures 4.5 x 3.5 cm, SUV 30.4 (image 87). The most intense portion of the mass is in the periphery, anteriorly. The mass involves the overlying skin, and is inseparable from the chest wall musculature. There is a separate, difficult to delineate FDG-avid mass in the central left breast (SUV 25.2; image 97).\par There are a few FDG-avid left axillary lymph nodes which measure up to 1.5 x 1.3 cm, SUV 18.1 (image 86).LUNGS: No abnormal FDG activity. No lung nodule.PLEURA/PERICARDIUM: No abnormal FDG activity. No effusion.HEPATOBILIARY/PANCREAS: Physiologic FDG activity. Liver SUV mean is 2.9.\par SPLEEN: Physiologic FDG activity. Normal in size.\par ADRENAL GLANDS: No abnormal FDG activity. No nodule.KIDNEYS/URINARY BLADDER: Physiologic excreted FDG activity.REPRODUCTIVE ORGANS: No abnormal FDG activity.\par ABDOMINOPELVIC NODES: No enlarged or FDG-avid lymph node.BOWEL/PERITONEUM/MESENTERY: No abnormal FDG activity. Large hiatal hernia with distended esophagus without associated hypermetabolism.\par BONES/SOFT TISSUES: Several FDG-avid mixed lytic and sclerotic lesions in the cervical, thoracic, and lumbar spine, bilateral pelvis, sternum, several bilateral ribs, and proximal right femur, including right intertrochanteric region and right subtrochanteric region. For reference, a lesion in the posterior right iliac bone measures 3.6 x 1.8 cm, SUV 13.3 (image 188). The cortex of the long bones is intact on CT.\par \par IMPRESSION: Abnormal FDG-PET/CT scan.\par 1. FDG-avid left breast masses, medial aspect upper left breast, and central aspect left breast, are compatible with malignancy. The mass in the medial aspect of the upper left breast involves the overlying skin and is inseparable from the chest wall musculature. The mass in the central left breast is not well delineated on CT. Further evaluation may be performed with MRI.\par 2. FDG-avid left axillary lymph node metastases.\par 3. FDG-avid mixed lytic nd sclerotic bone metastases in axial skeleton and proximal right femur.\par 4. Large hiatal hernia with distended esophagus, without associated hypermetabolism. Please correlate clinically.Findings emailed to Dr. Amita Gonsalves.\par \par --- End of Report ---\par \par PING REYNA MD; Attending Nuclear Medicine\par This document has been electronically signed. Jul 12 2022 1:44PM

## 2022-09-15 NOTE — ASSESSMENT
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 6/2022, er /pr+ ki67 favorable, her2-\par supplies ordered\par  met stage 4 breast cancer/ 2010 tahbsa ovaries\par on anastrazole, ibrance, awaiting radiation eval\par \par  datacomplexity- mod  lab, xr, old rec, test resultsreview,, visualize imagecptreview previous\par risk -mod surgery\par prognosis- stage 4 \par assess-advance directive- lost  911died in 2015\par  and son in war, very sad, not suicidal, on meds\par  \par  \par activity- nl\par  rec: 1-pain- controlled, 2-smell- none 3-drainage- supplies ordered, 4-bleeding- minimal,\par  5- size- palpable mass, 6- itch- none\par education discussed wound care\par discussed nutrition\par no cellulitis currently\par supplies ordered foam/ alginate\par staying at friend home in [place]  , lives in [place] will travel and return for reassessment\par

## 2022-09-15 NOTE — PLAN
[FreeTextEntry1] : Plan;\par paint wound on left breast with betadine \par apply wet to dry gauze \par cover with adhesive foam border\par f/u in 3 weeks\par

## 2022-09-20 ENCOUNTER — APPOINTMENT (OUTPATIENT)
Dept: HEMATOLOGY ONCOLOGY | Facility: CLINIC | Age: 67
End: 2022-09-20

## 2022-09-20 ENCOUNTER — RESULT REVIEW (OUTPATIENT)
Age: 67
End: 2022-09-20

## 2022-09-20 VITALS
TEMPERATURE: 98 F | RESPIRATION RATE: 16 BRPM | BODY MASS INDEX: 29.55 KG/M2 | WEIGHT: 172.18 LBS | OXYGEN SATURATION: 98 % | DIASTOLIC BLOOD PRESSURE: 87 MMHG | SYSTOLIC BLOOD PRESSURE: 123 MMHG | HEART RATE: 90 BPM

## 2022-09-20 LAB
BASOPHILS # BLD AUTO: 0.03 K/UL — SIGNIFICANT CHANGE UP (ref 0–0.2)
BASOPHILS NFR BLD AUTO: 1 % — SIGNIFICANT CHANGE UP (ref 0–2)
EOSINOPHIL # BLD AUTO: 0 K/UL — SIGNIFICANT CHANGE UP (ref 0–0.5)
EOSINOPHIL NFR BLD AUTO: 0 % — SIGNIFICANT CHANGE UP (ref 0–6)
HCT VFR BLD CALC: 38.3 % — SIGNIFICANT CHANGE UP (ref 34.5–45)
HGB BLD-MCNC: 12.7 G/DL — SIGNIFICANT CHANGE UP (ref 11.5–15.5)
IMM GRANULOCYTES NFR BLD AUTO: 0 % — SIGNIFICANT CHANGE UP (ref 0–0.9)
LYMPHOCYTES # BLD AUTO: 1.24 K/UL — SIGNIFICANT CHANGE UP (ref 1–3.3)
LYMPHOCYTES # BLD AUTO: 40.7 % — SIGNIFICANT CHANGE UP (ref 13–44)
MCHC RBC-ENTMCNC: 31.1 PG — SIGNIFICANT CHANGE UP (ref 27–34)
MCHC RBC-ENTMCNC: 33.2 G/DL — SIGNIFICANT CHANGE UP (ref 32–36)
MCV RBC AUTO: 93.6 FL — SIGNIFICANT CHANGE UP (ref 80–100)
MONOCYTES # BLD AUTO: 0.3 K/UL — SIGNIFICANT CHANGE UP (ref 0–0.9)
MONOCYTES NFR BLD AUTO: 9.8 % — SIGNIFICANT CHANGE UP (ref 2–14)
NEUTROPHILS # BLD AUTO: 1.48 K/UL — LOW (ref 1.8–7.4)
NEUTROPHILS NFR BLD AUTO: 48.5 % — SIGNIFICANT CHANGE UP (ref 43–77)
NRBC # BLD: 0 /100 WBCS — SIGNIFICANT CHANGE UP (ref 0–0)
PLATELET # BLD AUTO: 211 K/UL — SIGNIFICANT CHANGE UP (ref 150–400)
RBC # BLD: 4.09 M/UL — SIGNIFICANT CHANGE UP (ref 3.8–5.2)
RBC # FLD: 19.8 % — HIGH (ref 10.3–14.5)
WBC # BLD: 3.05 K/UL — LOW (ref 3.8–10.5)
WBC # FLD AUTO: 3.05 K/UL — LOW (ref 3.8–10.5)

## 2022-09-20 PROCEDURE — 99214 OFFICE O/P EST MOD 30 MIN: CPT

## 2022-09-20 NOTE — HISTORY OF PRESENT ILLNESS
[de-identified] : The patient first noticed something in her left breast in .  She underwent an excisional biopsy and reports that it was a "benign cyst."\par \par The patient reports that beginning several years ago, she noticed "something" in the exact place where the prior cyst had been, and thought this was perhaps another cyst.  She chose to follow it.  Approximately 1-2 years ago, this caused skin breakdown and the patient reports she thought it was an abscess and was applying antibiotics on it.\par \par More recently, she presented to her psychiatry nurse practitioner and showed her the left breast lesion, was told this was breast cancer.  She consequently presented to a surgical physician at [facility].  That physician apparently performed a chest x-ray which was reportedly normal, an EKG which was normal, and a mammogram and sonogram (the results of which I do not have).  The patient was offered to have an excisional biopsy the following day, which she did on 2002 at [facility] in [place].  The outside pathology report shows invasive duct carcinoma, poorly differentiated, Mohall score 8, measuring 4 cm, with angiolymphatic space invasion present, and carcinoma extending to the epidermis associated with ulceration, as well as to the peripheral and deep margins, estrogen receptor returned positive, progesterone receptor positive, and HER-2/samson negative (0), and Ki-67 was 8%.  The patient was last seen yesterday by her surgeon and informed him she wished to come to [place] for her medical care and reports that he did not speak afterwards but walked out of the room.  A nurse in his practice packed the wound site per patient report.\par \par The patient is here today in consultation regarding further treatment recommendations.  She notes a good appetite, stable weight, and excellent performance status.\par \par The patient reports that her  was the  on  at the [employer] site and subsequently developed pancreatic cancer, with the patient reporting he  a horrible death and his treatment team treated him very very poorly.  Subsequently her son also developed an unspecified illness and presented for medical care, and was sent out by the team reportedly thinking he was drug-seeking, and subsequently he suffered a severe medical illness; he then became an alcoholic and  of unspecified causes soon afterwards.  The patient reports that this has made her feel "like I am going crazy."  She reports that she has a generalized anxiety disorder for which she is followed by a nurse practitioner who writes prescriptions as well as a /therapist.  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  She reports that is why she has not seen a physician or sought medical care for over 10 years and has not had a mammogram for at least that many years.\par \par The patient had PET/CT with showed metastatic disease of the bones.  The patient was started on Anastrozole and palbociclib on 22.\par  [de-identified] : The patient presents for follow up.  Started anastrozole and palbociclib on 7/25/22.\par \par Due to start D1 of palbo tomorrow.\par \par She denies any treatment related side effects. \par \par The patient states she is feeling "great" with no complaints. \par \par She notes a good appetite, stable weight and excellent performance status. She states she has been walking 3 mile/day.\par \par She is currently following up with wound clinic.  She states wound improving.  \par \par Also met with radiation oncology, note appreciated.  \par \par She moved in with her niece who lives locally.  She plans on going back to [place] for a week for the [employer] holidays and visit family and her dog.  \par \par PET/CT scan, 7/11/22- IMPRESSION: Abnormal FDG-PET/CT scan.\par \par 1. FDG-avid left breast masses, medial aspect upper left breast, and central aspect left breast, are compatible with malignancy. The mass in the medial aspect of the upper left breast involves the overlying skin and is inseparable from the chest wall musculature. The mass in the central left breast is not well delineated on CT. Further evaluation may be performed with MRI.\par 2. FDG-avid left axillary lymph node metastases.\par 3. FDG-avid mixed lytic and sclerotic bone metastases in axial skeleton and proximal right femur.\par 4. Large hiatal hernia with distended esophagus, without associated hypermetabolism. Please correlate clinically.\par \par

## 2022-09-20 NOTE — PHYSICAL EXAM
[Normal] : affect appropriate [de-identified] : Left breast with fungating lesion upper central breast, smaller.  Slight serosanguineous drainage noted from wound.  Palpable matted left LAD, smaller.  Right breast with no discrete palpable axillary nodes, no palpable axillary nodes.

## 2022-09-21 LAB
ALBUMIN SERPL ELPH-MCNC: 4.2 G/DL
ALP BLD-CCNC: 84 U/L
ALT SERPL-CCNC: 15 U/L
ANION GAP SERPL CALC-SCNC: 13 MMOL/L
AST SERPL-CCNC: 15 U/L
BILIRUB SERPL-MCNC: 0.3 MG/DL
BUN SERPL-MCNC: 8 MG/DL
CALCIUM SERPL-MCNC: 9.2 MG/DL
CANCER AG27-29 SERPL-ACNC: 62.8 U/ML
CEA SERPL-MCNC: 69.9 NG/ML
CHLORIDE SERPL-SCNC: 106 MMOL/L
CO2 SERPL-SCNC: 24 MMOL/L
CREAT SERPL-MCNC: 0.7 MG/DL
EGFR: 95 ML/MIN/1.73M2
GLUCOSE SERPL-MCNC: 106 MG/DL
POTASSIUM SERPL-SCNC: 3.9 MMOL/L
PROT SERPL-MCNC: 6.1 G/DL
SODIUM SERPL-SCNC: 142 MMOL/L

## 2022-09-22 ENCOUNTER — TRANSCRIPTION ENCOUNTER (OUTPATIENT)
Age: 67
End: 2022-09-22

## 2022-09-28 ENCOUNTER — TRANSCRIPTION ENCOUNTER (OUTPATIENT)
Age: 67
End: 2022-09-28

## 2022-10-18 ENCOUNTER — RESULT REVIEW (OUTPATIENT)
Age: 67
End: 2022-10-18

## 2022-10-18 ENCOUNTER — APPOINTMENT (OUTPATIENT)
Dept: HEMATOLOGY ONCOLOGY | Facility: CLINIC | Age: 67
End: 2022-10-18

## 2022-10-18 ENCOUNTER — APPOINTMENT (OUTPATIENT)
Dept: WOUND CARE | Facility: CLINIC | Age: 67
End: 2022-10-18

## 2022-10-18 VITALS — HEIGHT: 64 IN | BODY MASS INDEX: 29.19 KG/M2 | WEIGHT: 171 LBS

## 2022-10-18 VITALS
BODY MASS INDEX: 28.98 KG/M2 | WEIGHT: 169.76 LBS | OXYGEN SATURATION: 96 % | TEMPERATURE: 97.3 F | HEIGHT: 63.98 IN | RESPIRATION RATE: 16 BRPM | SYSTOLIC BLOOD PRESSURE: 126 MMHG | DIASTOLIC BLOOD PRESSURE: 79 MMHG | HEART RATE: 103 BPM

## 2022-10-18 VITALS — DIASTOLIC BLOOD PRESSURE: 88 MMHG | SYSTOLIC BLOOD PRESSURE: 138 MMHG | HEART RATE: 106 BPM

## 2022-10-18 LAB
BASOPHILS # BLD AUTO: 0.04 K/UL — SIGNIFICANT CHANGE UP (ref 0–0.2)
BASOPHILS NFR BLD AUTO: 0.9 % — SIGNIFICANT CHANGE UP (ref 0–2)
EOSINOPHIL # BLD AUTO: 0.03 K/UL — SIGNIFICANT CHANGE UP (ref 0–0.5)
EOSINOPHIL NFR BLD AUTO: 0.7 % — SIGNIFICANT CHANGE UP (ref 0–6)
HCT VFR BLD CALC: 40.9 % — SIGNIFICANT CHANGE UP (ref 34.5–45)
HGB BLD-MCNC: 13.5 G/DL — SIGNIFICANT CHANGE UP (ref 11.5–15.5)
IMM GRANULOCYTES NFR BLD AUTO: 0.5 % — SIGNIFICANT CHANGE UP (ref 0–0.9)
LYMPHOCYTES # BLD AUTO: 1.76 K/UL — SIGNIFICANT CHANGE UP (ref 1–3.3)
LYMPHOCYTES # BLD AUTO: 40.8 % — SIGNIFICANT CHANGE UP (ref 13–44)
MCHC RBC-ENTMCNC: 31.9 PG — SIGNIFICANT CHANGE UP (ref 27–34)
MCHC RBC-ENTMCNC: 33 G/DL — SIGNIFICANT CHANGE UP (ref 32–36)
MCV RBC AUTO: 96.7 FL — SIGNIFICANT CHANGE UP (ref 80–100)
MONOCYTES # BLD AUTO: 0.45 K/UL — SIGNIFICANT CHANGE UP (ref 0–0.9)
MONOCYTES NFR BLD AUTO: 10.4 % — SIGNIFICANT CHANGE UP (ref 2–14)
NEUTROPHILS # BLD AUTO: 2.01 K/UL — SIGNIFICANT CHANGE UP (ref 1.8–7.4)
NEUTROPHILS NFR BLD AUTO: 46.7 % — SIGNIFICANT CHANGE UP (ref 43–77)
NRBC # BLD: 0 /100 WBCS — SIGNIFICANT CHANGE UP (ref 0–0)
PLATELET # BLD AUTO: 196 K/UL — SIGNIFICANT CHANGE UP (ref 150–400)
RBC # BLD: 4.23 M/UL — SIGNIFICANT CHANGE UP (ref 3.8–5.2)
RBC # FLD: 19.8 % — HIGH (ref 10.3–14.5)
WBC # BLD: 4.31 K/UL — SIGNIFICANT CHANGE UP (ref 3.8–10.5)
WBC # FLD AUTO: 4.31 K/UL — SIGNIFICANT CHANGE UP (ref 3.8–10.5)

## 2022-10-18 PROCEDURE — 90791 PSYCH DIAGNOSTIC EVALUATION: CPT

## 2022-10-18 PROCEDURE — 99214 OFFICE O/P EST MOD 30 MIN: CPT

## 2022-10-18 NOTE — DATA REVIEWED
[FreeTextEntry1] : \par EXAM: [id number] - PETCT SKUL-THI ONC FDG INIT - ORDERED BY: AMITA GONSALVES\par PROCEDURE DATE: 07/11/2022\par INTERPRETATION: PROCEDURE: PET/CT SKULL BASE-MID THIGH IMAGING\par CLINICAL INFORMATION: 67-year-old female referred for evaluation of locally advanced left breast cancer, upper inner left breast (ER/OK positive) PET/CT is done as part of the initial treatment strategy evaluation.\par RADIOPHARMACEUTICAL: 12.93 mCi F-18, FDG, I.V.\par \par TECHNIQUE: Fasting blood sugar measured prior to injection of radiopharmaceutical was 82 mg/dl. Following intravenous injection of the radiopharmaceutical and an uptake period of approximately 70 minutes, FDG-PET/CT was obtained on a [employer] Discovery 710 from skull base to mid thigh. Oral contrast was given during the uptake period. CT was performed during shallow respiration. The CT protocol was optimized for PET attenuation correction and anatomic localization. The CT protocol was not designed to produce and cannot replace state-of-the-art diagnostic CT images with specific imaging protocols for different body parts and indications. Images were reviewed on a dedicated workstation using multiplanar reconstruction.\par \par The standardized uptake values (SUV) are normalized to patient body weight and indicate the highest activity concentration (SUVmax) in a given disease site. All image numbers refer to axial image number.\par \par COMPARISON: No prior PET/CT.\par OTHER STUDIES USED FOR CORRELATION: None available.\par \par FINDINGS:\par HEAD/NECK: Physiologic FDG activity in visualized brain, head, and neck.\par CHEST: There is a an FDG-avid mass in the medial aspect of the upper left breast which measures 4.5 x 3.5 cm, SUV 30.4 (image 87). The most intense portion of the mass is in the periphery, anteriorly. The mass involves the overlying skin, and is inseparable from the chest wall musculature. There is a separate, difficult to delineate FDG-avid mass in the central left breast (SUV 25.2; image 97).\par There are a few FDG-avid left axillary lymph nodes which measure up to 1.5 x 1.3 cm, SUV 18.1 (image 86).LUNGS: No abnormal FDG activity. No lung nodule.PLEURA/PERICARDIUM: No abnormal FDG activity. No effusion.HEPATOBILIARY/PANCREAS: Physiologic FDG activity. Liver SUV mean is 2.9.\par SPLEEN: Physiologic FDG activity. Normal in size.\par ADRENAL GLANDS: No abnormal FDG activity. No nodule.KIDNEYS/URINARY BLADDER: Physiologic excreted FDG activity.REPRODUCTIVE ORGANS: No abnormal FDG activity.\par ABDOMINOPELVIC NODES: No enlarged or FDG-avid lymph node.BOWEL/PERITONEUM/MESENTERY: No abnormal FDG activity. Large hiatal hernia with distended esophagus without associated hypermetabolism.\par BONES/SOFT TISSUES: Several FDG-avid mixed lytic and sclerotic lesions in the cervical, thoracic, and lumbar spine, bilateral pelvis, sternum, several bilateral ribs, and proximal right femur, including right intertrochanteric region and right subtrochanteric region. For reference, a lesion in the posterior right iliac bone measures 3.6 x 1.8 cm, SUV 13.3 (image 188). The cortex of the long bones is intact on CT.\par \par IMPRESSION: Abnormal FDG-PET/CT scan.\par 1. FDG-avid left breast masses, medial aspect upper left breast, and central aspect left breast, are compatible with malignancy. The mass in the medial aspect of the upper left breast involves the overlying skin and is inseparable from the chest wall musculature. The mass in the central left breast is not well delineated on CT. Further evaluation may be performed with MRI.\par 2. FDG-avid left axillary lymph node metastases.\par 3. FDG-avid mixed lytic nd sclerotic bone metastases in axial skeleton and proximal right femur.\par 4. Large hiatal hernia with distended esophagus, without associated hypermetabolism. Please correlate clinically.Findings emailed to Dr. Amita Gonsalves.\par \par --- End of Report ---\par \par PING REYNA MD; Attending Nuclear Medicine\par This document has been electronically signed. Jul 12 2022 1:44PM

## 2022-10-18 NOTE — PHYSICAL EXAM
[Fully active, able to carry on all pre-disease performance without restriction] : Status 0 - Fully active, able to carry on all pre-disease performance without restriction [Normal] : affect appropriate [de-identified] : Left breast with much improved / closing with small nodular lesion centrally, smaller.  No palp adenopathy L ax.  Right breast with no discrete palpable mass, no palpable axillary nodes.

## 2022-10-18 NOTE — REVIEW OF SYSTEMS
[Anxiety] : anxiety [Negative] : Allergic/Immunologic [FreeTextEntry2] : as above [de-identified] : breast wound above

## 2022-10-18 NOTE — PHYSICAL EXAM
[JVD] : no jugular venous distention  [Normal Breath Sounds] : Normal breath sounds [Skin Ulcer] : ulcer [Oriented to Person] : oriented to person [Oriented to Place] : oriented to place [Oriented to Time] : oriented to time [Calm] : calm [de-identified] : nad [de-identified] : brandon [de-identified] : left breast ulcer [FreeTextEntry1] : no wrist edema [FreeTextEntry2] : non palp axilla [de-identified] : nl [de-identified] : intact rom [Please See PDF for [employer]] : Please See PDF for [employer].

## 2022-10-18 NOTE — ASSESSMENT
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 6/2022, er /pr+ ki67 favorable, [id number]\par 10/18  much smaller ulcer, tumor similar\par no cellulitis\par \par supplies ordered\par \par  met stage 4 breast cancer/ 2010 tahbsa ovaries\par on anastrazole, ibrance, awaiting radiation eval\par \par  datacomplexity- mod  lab, xr, old rec, test resultsreview,, visualize imagecptreview previous\par risk -mod surgery\par prognosis- stage 4 \par assess-advance directive- lost  911died in 2015\par  and son in war, very sad, not suicidal, on meds\par  \par  \par activity- nl\par  rec: 1-pain- controlled, 2-smell- none 3-drainage- supplies ordered, 4-bleeding- minimal,\par  5- size- palpable mass, 6- itch- none\par education discussed wound care\par discussed nutrition\par no cellulitis currently\par supplies ordered foam/ alginate\par staying at friend home in [place]  , lives in [place] will travel and return for reassessment\par

## 2022-10-18 NOTE — HISTORY OF PRESENT ILLNESS
[FreeTextEntry1] : location  left breast mass with ulceration\par no new cjhanges\par severity no fever , on therapy\par timing/duration months, postponed treatment during covid\par quality some oozing, drainage no pain\par other on rx awaiting  radiation, has met to bones\par  PET/CT with showed metastatic disease of the bones. The patient was started on Anastrozole and palbociclib on 7/25/22.  Started anastrozole and palbociclib on 7/25/22.\par Due to start C2 D1 today.  \par \par   history of depression, which she is followed by a psychiatrist who manages her medications. \par  occasional bilateral hip pain when getting up after sitting long periods of time, stable. Pain resolves after moving.\par   1. FDG-avid left breast masses, medial aspect upper left breast, and central aspect left breast, are compatible with malignancy. The mass in the medial aspect of the upper left breast involves the overlying skin and is inseparable from the chest wall musculature. The mass in the central left breast is not well delineated on CT. Further evaluation may be performed with MRI.\par 2. FDG-avid left axillary lymph node metastases.\par 3. FDG-avid mixed lytic and sclerotic bone metastases in axial skeleton and proximal right femur.\par 4. Large hiatal hernia with distended esophagus, without associated hypermetabolism. Please correlate clinically.\par \par

## 2022-10-18 NOTE — HISTORY OF PRESENT ILLNESS
[de-identified] : The patient first noticed something in her left breast in .  She underwent an excisional biopsy and reports that it was a "benign cyst."\par \par The patient reports that beginning several years ago, she noticed "something" in the exact place where the prior cyst had been, and thought this was perhaps another cyst.  She chose to follow it.  Approximately 1-2 years ago, this caused skin breakdown and the patient reports she thought it was an abscess and was applying antibiotics on it.\par \par More recently, she presented to her psychiatry nurse practitioner and showed her the left breast lesion, was told this was breast cancer.  She consequently presented to a surgical physician at [facility].  That physician apparently performed a chest x-ray which was reportedly normal, an EKG which was normal, and a mammogram and sonogram (the results of which I do not have).  The patient was offered to have an excisional biopsy the following day, which she did on 2002 at [facility] in [place].  The outside pathology report shows invasive duct carcinoma, poorly differentiated, Allison score 8, measuring 4 cm, with angiolymphatic space invasion present, and carcinoma extending to the epidermis associated with ulceration, as well as to the peripheral and deep margins, estrogen receptor returned positive, progesterone receptor positive, and HER-2/samson negative (0), and Ki-67 was 8%.  The patient was last seen yesterday by her surgeon and informed him she wished to come to [place] for her medical care and reports that he did not speak afterwards but walked out of the room.  A nurse in his practice packed the wound site per patient report.\par \par The patient is here today in consultation regarding further treatment recommendations.  She notes a good appetite, stable weight, and excellent performance status.\par \par The patient reports that her  was the  on  at the [employer] site and subsequently developed pancreatic cancer, with the patient reporting he  a horrible death and his treatment team treated him very very poorly.  Subsequently her son also developed an unspecified illness and presented for medical care, and was sent out by the team reportedly thinking he was drug-seeking, and subsequently he suffered a severe medical illness; he then became an alcoholic and  of unspecified causes soon afterwards.  The patient reports that this has made her feel "like I am going crazy."  She reports that she has a generalized anxiety disorder for which she is followed by a nurse practitioner who writes prescriptions as well as a /therapist.  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  She reports that is why she has not seen a physician or sought medical care for over 10 years and has not had a mammogram for at least that many years.\par \par The patient had PET/CT with showed metastatic disease of the bones.  The patient was started on Anastrozole and palbociclib on 22.\par  [de-identified] : The patient presents for follow up.  \par \par Started anastrozole and palbociclib on 7/25/22.\par \par FOr D1 of palbo tonight.\par \par She denies any treatment related side effects.\par \par "I feel fine -like I do not have cancer". \par \par She is currently following up with wound clinic.  Wound is improving.  \par \par She walks 3 mi /day.\par \par Her BMI was "high" so pt decided to lose wt through change in diet and dietary discretion to decrease BMI to decrease potential contribution to worse outcomes.  \par \par The patient states she is feeling "great" with no complaints. \par \par Notes a good appetite, stable weight and excellent performance status. \par \par She moved in with her niece who lives locally.  She wishes to cont to get her care here as not confident she is finding comparable care locally in [place] where [place] lives.    \par \par PET/CT scan, 7/11/22- IMPRESSION: Abnormal FDG-PET/CT scan.\par \par 1. FDG-avid left breast masses, medial aspect upper left breast, and central aspect left breast, are compatible with malignancy. The mass in the medial aspect of the upper left breast involves the overlying skin and is inseparable from the chest wall musculature. The mass in the central left breast is not well delineated on CT. Further evaluation may be performed with MRI.\par 2. FDG-avid left axillary lymph node metastases.\par 3. FDG-avid mixed lytic and sclerotic bone metastases in axial skeleton and proximal right femur.\par 4. Large hiatal hernia with distended esophagus, without associated hypermetabolism. Please correlate clinically.\par \par

## 2022-10-19 ENCOUNTER — TRANSCRIPTION ENCOUNTER (OUTPATIENT)
Age: 67
End: 2022-10-19

## 2022-10-21 ENCOUNTER — NON-APPOINTMENT (OUTPATIENT)
Age: 67
End: 2022-10-21

## 2022-10-21 LAB
ALBUMIN SERPL ELPH-MCNC: 4.5 G/DL
ALP BLD-CCNC: 90 U/L
ALT SERPL-CCNC: 16 U/L
ANION GAP SERPL CALC-SCNC: 13 MMOL/L
AST SERPL-CCNC: 20 U/L
BILIRUB SERPL-MCNC: 0.4 MG/DL
BUN SERPL-MCNC: 10 MG/DL
CALCIUM SERPL-MCNC: 9.2 MG/DL
CANCER AG27-29 SERPL-ACNC: 52.3 U/ML
CEA SERPL-MCNC: 48.7 NG/ML
CHLORIDE SERPL-SCNC: 108 MMOL/L
CO2 SERPL-SCNC: 23 MMOL/L
CREAT SERPL-MCNC: 0.82 MG/DL
EGFR: 79 ML/MIN/1.73M2
GLUCOSE SERPL-MCNC: 96 MG/DL
POTASSIUM SERPL-SCNC: 4.4 MMOL/L
PROT SERPL-MCNC: 6.3 G/DL
SODIUM SERPL-SCNC: 144 MMOL/L

## 2022-11-02 ENCOUNTER — NON-APPOINTMENT (OUTPATIENT)
Age: 67
End: 2022-11-02

## 2022-11-03 ENCOUNTER — TRANSCRIPTION ENCOUNTER (OUTPATIENT)
Age: 67
End: 2022-11-03

## 2022-11-08 ENCOUNTER — NON-APPOINTMENT (OUTPATIENT)
Age: 67
End: 2022-11-08

## 2022-11-15 ENCOUNTER — OUTPATIENT (OUTPATIENT)
Dept: OUTPATIENT SERVICES | Facility: HOSPITAL | Age: 67
LOS: 1 days | Discharge: HOME | End: 2022-11-15

## 2022-11-15 ENCOUNTER — RESULT REVIEW (OUTPATIENT)
Age: 67
End: 2022-11-15

## 2022-11-15 ENCOUNTER — APPOINTMENT (OUTPATIENT)
Dept: WOUND CARE | Facility: CLINIC | Age: 67
End: 2022-11-15

## 2022-11-15 ENCOUNTER — APPOINTMENT (OUTPATIENT)
Dept: HEMATOLOGY ONCOLOGY | Facility: CLINIC | Age: 67
End: 2022-11-15

## 2022-11-15 VITALS
RESPIRATION RATE: 18 BRPM | DIASTOLIC BLOOD PRESSURE: 99 MMHG | SYSTOLIC BLOOD PRESSURE: 134 MMHG | HEART RATE: 106 BPM | TEMPERATURE: 97.1 F

## 2022-11-15 VITALS
OXYGEN SATURATION: 98 % | RESPIRATION RATE: 16 BRPM | HEIGHT: 64.06 IN | DIASTOLIC BLOOD PRESSURE: 78 MMHG | WEIGHT: 170.86 LBS | BODY MASS INDEX: 29.17 KG/M2 | TEMPERATURE: 98 F | SYSTOLIC BLOOD PRESSURE: 133 MMHG | HEART RATE: 108 BPM

## 2022-11-15 DIAGNOSIS — C50.919 MALIGNANT NEOPLASM OF UNSPECIFIED SITE OF UNSPECIFIED FEMALE BREAST: ICD-10-CM

## 2022-11-15 LAB
BASOPHILS # BLD AUTO: 0.05 K/UL — SIGNIFICANT CHANGE UP (ref 0–0.2)
BASOPHILS NFR BLD AUTO: 1.4 % — SIGNIFICANT CHANGE UP (ref 0–2)
EOSINOPHIL # BLD AUTO: 0 K/UL — SIGNIFICANT CHANGE UP (ref 0–0.5)
EOSINOPHIL NFR BLD AUTO: 0 % — SIGNIFICANT CHANGE UP (ref 0–6)
HCT VFR BLD CALC: 37.1 % — SIGNIFICANT CHANGE UP (ref 34.5–45)
HGB BLD-MCNC: 12.6 G/DL — SIGNIFICANT CHANGE UP (ref 11.5–15.5)
IMM GRANULOCYTES NFR BLD AUTO: 0.3 % — SIGNIFICANT CHANGE UP (ref 0–0.9)
LYMPHOCYTES # BLD AUTO: 1.4 K/UL — SIGNIFICANT CHANGE UP (ref 1–3.3)
LYMPHOCYTES # BLD AUTO: 39.2 % — SIGNIFICANT CHANGE UP (ref 13–44)
MCHC RBC-ENTMCNC: 33.7 PG — SIGNIFICANT CHANGE UP (ref 27–34)
MCHC RBC-ENTMCNC: 34 G/DL — SIGNIFICANT CHANGE UP (ref 32–36)
MCV RBC AUTO: 99.2 FL — SIGNIFICANT CHANGE UP (ref 80–100)
MONOCYTES # BLD AUTO: 0.31 K/UL — SIGNIFICANT CHANGE UP (ref 0–0.9)
MONOCYTES NFR BLD AUTO: 8.7 % — SIGNIFICANT CHANGE UP (ref 2–14)
NEUTROPHILS # BLD AUTO: 1.8 K/UL — SIGNIFICANT CHANGE UP (ref 1.8–7.4)
NEUTROPHILS NFR BLD AUTO: 50.4 % — SIGNIFICANT CHANGE UP (ref 43–77)
NRBC # BLD: 0 /100 WBCS — SIGNIFICANT CHANGE UP (ref 0–0)
PLATELET # BLD AUTO: 201 K/UL — SIGNIFICANT CHANGE UP (ref 150–400)
RBC # BLD: 3.74 M/UL — LOW (ref 3.8–5.2)
RBC # FLD: 17.1 % — HIGH (ref 10.3–14.5)
WBC # BLD: 3.57 K/UL — LOW (ref 3.8–10.5)
WBC # FLD AUTO: 3.57 K/UL — LOW (ref 3.8–10.5)

## 2022-11-15 PROCEDURE — 99214 OFFICE O/P EST MOD 30 MIN: CPT

## 2022-11-15 PROCEDURE — 99213 OFFICE O/P EST LOW 20 MIN: CPT

## 2022-11-15 PROCEDURE — 90834 PSYTX W PT 45 MINUTES: CPT

## 2022-11-15 NOTE — HISTORY OF PRESENT ILLNESS
[de-identified] : The patient first noticed something in her left breast in .  She underwent an excisional biopsy and reports that it was a "benign cyst."\par \par The patient reports that beginning several years ago, she noticed "something" in the exact place where the prior cyst had been, and thought this was perhaps another cyst.  She chose to follow it.  Approximately 1-2 years ago, this caused skin breakdown and the patient reports she thought it was an abscess and was applying antibiotics on it.\par \par More recently, she presented to her psychiatry nurse practitioner and showed her the left breast lesion, was told this was breast cancer.  She consequently presented to a surgical physician at [facility].  That physician apparently performed a chest x-ray which was reportedly normal, an EKG which was normal, and a mammogram and sonogram (the results of which I do not have).  The patient was offered to have an excisional biopsy the following day, which she did on 2002 at [facility] in [place].  The outside pathology report shows invasive duct carcinoma, poorly differentiated, [place] score 8, measuring 4 cm, with angiolymphatic space invasion present, and carcinoma extending to the epidermis associated with ulceration, as well as to the peripheral and deep margins, estrogen receptor returned positive, progesterone receptor positive, and HER-2/samson negative (0), and Ki-67 was 8%.  The patient was last seen yesterday by her surgeon and informed him she wished to come to [place] for her medical care and reports that he did not speak afterwards but walked out of the room.  A nurse in his practice packed the wound site per patient report.\par \par The patient is here today in consultation regarding further treatment recommendations.  She notes a good appetite, stable weight, and excellent performance status.\par \par The patient reports that her  was the  on  at the [employer] site and subsequently developed pancreatic cancer, with the patient reporting he  a horrible death and his treatment team treated him very very poorly.  Subsequently her son also developed an unspecified illness and presented for medical care, and was sent out by the team reportedly thinking he was drug-seeking, and subsequently he suffered a severe medical illness; he then became an alcoholic and  of unspecified causes soon afterwards.  The patient reports that this has made her feel "like I am going crazy."  She reports that she has a generalized anxiety disorder for which she is followed by a nurse practitioner who writes prescriptions as well as a /therapist.  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  She reports that is why she has not seen a physician or sought medical care for over 10 years and has not had a mammogram for at least that many years.\par \par The patient had PET/CT with showed metastatic disease of the bones.  The patient was started on Anastrozole and palbociclib on 22.\par  [de-identified] : The patient presents for follow up.  \par \par Started anastrozole and palbociclib on 7/25/22.\par \par For D1 of palbo tonight.\par \par She denies any treatment related side effects.\par \par She is currently following up with wound clinic.  Wound is continuously improving.  \par \par She walks 3 mi /day.\par \par Her feral cats brought fleas home- had multiple bites on legs - had to fumigate and clean her home - done.  \par \par Notes a good appetite, stable weight and excellent performance status. \par \par PET/CT scan, 7/11/22- IMPRESSION: Abnormal FDG-PET/CT scan.\par \par 1. FDG-avid left breast masses, medial aspect upper left breast, and central aspect left breast, are compatible with malignancy. The mass in the medial aspect of the upper left breast involves the overlying skin and is inseparable from the chest wall musculature. The mass in the central left breast is not well delineated on CT. Further evaluation may be performed with MRI.\par 2. FDG-avid left axillary lymph node metastases.\par 3. FDG-avid mixed lytic and sclerotic bone metastases in axial skeleton and proximal right femur.\par 4. Large hiatal hernia with distended esophagus, without associated hypermetabolism. Please correlate clinically.\par \par

## 2022-11-15 NOTE — PHYSICAL EXAM
[Fully active, able to carry on all pre-disease performance without restriction] : Status 0 - Fully active, able to carry on all pre-disease performance without restriction [Normal] : affect appropriate [de-identified] : Left breast much improved / closing with small nodular lesion on periphery, smaller, with good granulation tisse.  No palp adenopathy L ax.  Right breast with no discrete palpable mass, no palpable axillary nodes.

## 2022-11-16 ENCOUNTER — NON-APPOINTMENT (OUTPATIENT)
Age: 67
End: 2022-11-16

## 2022-11-17 ENCOUNTER — APPOINTMENT (OUTPATIENT)
Dept: PSYCHIATRY | Facility: CLINIC | Age: 67
End: 2022-11-17

## 2022-11-17 LAB
ALBUMIN SERPL ELPH-MCNC: 4.4 G/DL
ALP BLD-CCNC: 89 U/L
ALT SERPL-CCNC: 18 U/L
ANION GAP SERPL CALC-SCNC: 13 MMOL/L
AST SERPL-CCNC: 20 U/L
BILIRUB SERPL-MCNC: 0.4 MG/DL
BUN SERPL-MCNC: 10 MG/DL
CALCIUM SERPL-MCNC: 9.2 MG/DL
CANCER AG27-29 SERPL-ACNC: 44.1 U/ML
CEA SERPL-MCNC: 30.2 NG/ML
CHLORIDE SERPL-SCNC: 107 MMOL/L
CO2 SERPL-SCNC: 23 MMOL/L
CREAT SERPL-MCNC: 0.71 MG/DL
EGFR: 94 ML/MIN/1.73M2
GLUCOSE SERPL-MCNC: 97 MG/DL
POTASSIUM SERPL-SCNC: 4.3 MMOL/L
PROT SERPL-MCNC: 6.2 G/DL
SODIUM SERPL-SCNC: 143 MMOL/L

## 2022-11-17 PROCEDURE — 99204 OFFICE O/P NEW MOD 45 MIN: CPT

## 2022-11-21 NOTE — PLAN
[FreeTextEntry1] : 11/15/2022\par Plan;\par paint wound on left breast with betadine \par apply adhesive foam\par f/u in 4 weeks\par

## 2022-11-21 NOTE — ASSESSMENT
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 6/2022, er /pr+ ki67 favorable, her2-\par supplies ordered\par  met stage 4 breast cancer/ 2010 tahbsa ovaries\par on anastrazole, ibrance, awaiting radiation eval\par \par  datacomplexity- mod  lab, xr, old rec, test resultsreview,, visualize imagecptreview previous\par risk -mod surgery\par prognosis- stage 4 \par assess-advance directive- lost  911died in 2015\par  and son in war, very sad, not suicidal, on meds\par  \par  \par activity- nl\par  rec: 1-pain- controlled, 2-smell- none 3-drainage- supplies ordered, 4-bleeding- minimal,\par  5- size- palpable mass, 6- itch- none\par education discussed wound care\par discussed nutrition\par no cellulitis currently\par supplies ordered foam/ alginate\par staying at friend home in [place]  , [facility] in [place] will travel and return for reassessment\par \par 11/15/2022\par patient here for follow up on left breast wound, 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

## 2022-11-21 NOTE — PHYSICAL EXAM
[Normal Breath Sounds] : Normal breath sounds [Skin Ulcer] : ulcer [Oriented to Person] : oriented to person [Oriented to Place] : oriented to place [Oriented to Time] : oriented to time [Calm] : calm [Please See PDF for [employer] Analytics] : Please See PDF for [employer]. [JVD] : no jugular venous distention  [de-identified] : nad [de-identified] : brandon [de-identified] : left breast ulcer [FreeTextEntry1] : no wrist edema [FreeTextEntry2] : non palp axilla [de-identified] : nl [de-identified] : intact rom

## 2022-11-21 NOTE — DATA REVIEWED
[FreeTextEntry1] : \par EXAM: [id number] - PETCT SKUL-THI ONC FDG INIT - ORDERED BY: AMITA GONSALVES\par PROCEDURE DATE: 07/11/2022\par INTERPRETATION: PROCEDURE: PET/CT SKULL BASE-MID THIGH IMAGING\par CLINICAL INFORMATION: 67-year-old female referred for evaluation of locally advanced left breast cancer, upper inner left breast (ER/[place] positive) PET/CT is done as part of the initial treatment strategy evaluation.\par RADIOPHARMACEUTICAL: 12.93 mCi F-18, FDG, I.V.\par \par TECHNIQUE: Fasting blood sugar measured prior to injection of radiopharmaceutical was 82 mg/dl. Following intravenous injection of the radiopharmaceutical and an uptake period of approximately 70 minutes, FDG-PET/CT was obtained on a [employer] Discovery 710 from skull base to mid thigh. Oral contrast was given during the uptake period. CT was performed during shallow respiration. The CT protocol was optimized for PET attenuation correction and anatomic localization. The CT protocol was not designed to produce and cannot replace state-of-the-art diagnostic CT images with specific imaging protocols for different body parts and indications. Images were reviewed on a dedicated workstation using multiplanar reconstruction.\par \par The standardized uptake values (SUV) are normalized to patient body weight and indicate the highest activity concentration (SUVmax) in a given disease site. All image numbers refer to axial image number.\par \par COMPARISON: No prior PET/CT.\par OTHER STUDIES USED FOR CORRELATION: None available.\par \par FINDINGS:\par HEAD/NECK: Physiologic FDG activity in visualized brain, head, and neck.\par CHEST: There is a an FDG-avid mass in the medial aspect of the upper left breast which measures 4.5 x 3.5 cm, SUV 30.4 (image 87). The most intense portion of the mass is in the periphery, anteriorly. The mass involves the overlying skin, and is inseparable from the chest wall musculature. There is a separate, difficult to delineate FDG-avid mass in the central left breast (SUV 25.2; image 97).\par There are a few FDG-avid left axillary lymph nodes which measure up to 1.5 x 1.3 cm, SUV 18.1 (image 86).LUNGS: No abnormal FDG activity. No lung nodule.PLEURA/PERICARDIUM: No abnormal FDG activity. No effusion.HEPATOBILIARY/PANCREAS: Physiologic FDG activity. Liver SUV mean is 2.9.\par SPLEEN: Physiologic FDG activity. Normal in size.\par ADRENAL GLANDS: No abnormal FDG activity. No nodule.KIDNEYS/URINARY BLADDER: Physiologic excreted FDG activity.REPRODUCTIVE ORGANS: No abnormal FDG activity.\par ABDOMINOPELVIC NODES: No enlarged or FDG-avid lymph node.BOWEL/PERITONEUM/MESENTERY: No abnormal FDG activity. Large hiatal hernia with distended esophagus without associated hypermetabolism.\par BONES/SOFT TISSUES: Several FDG-avid mixed lytic and sclerotic lesions in the cervical, thoracic, and lumbar spine, bilateral pelvis, sternum, several bilateral ribs, and proximal right femur, including right intertrochanteric region and right subtrochanteric region. For reference, a lesion in the posterior right iliac bone measures 3.6 x 1.8 cm, SUV 13.3 (image 188). The cortex of the long bones is intact on CT.\par \par IMPRESSION: Abnormal FDG-PET/CT scan.\par 1. FDG-avid left breast masses, medial aspect upper left breast, and central aspect left breast, are compatible with malignancy. The mass in the medial aspect of the upper left breast involves the overlying skin and is inseparable from the chest wall musculature. The mass in the central left breast is not well delineated on CT. Further evaluation may be performed with MRI.\par 2. FDG-avid left axillary lymph node metastases.\par 3. FDG-avid mixed lytic nd sclerotic bone metastases in axial skeleton and proximal right femur.\par 4. Large hiatal hernia with distended esophagus, without associated hypermetabolism. Please correlate clinically.Findings emailed to Dr. Amita Gonsalves.\par \par --- End of Report ---\par \par PING REYNA MD; Attending Nuclear Medicine\par This document has been electronically signed. Jul 12 2022 1:44PM

## 2022-12-02 ENCOUNTER — TRANSCRIPTION ENCOUNTER (OUTPATIENT)
Age: 67
End: 2022-12-02

## 2022-12-06 ENCOUNTER — TRANSCRIPTION ENCOUNTER (OUTPATIENT)
Age: 67
End: 2022-12-06

## 2022-12-14 ENCOUNTER — RESULT REVIEW (OUTPATIENT)
Age: 67
End: 2022-12-14

## 2022-12-14 ENCOUNTER — APPOINTMENT (OUTPATIENT)
Dept: HEMATOLOGY ONCOLOGY | Facility: CLINIC | Age: 67
End: 2022-12-14

## 2022-12-14 VITALS
HEIGHT: 64.06 IN | HEART RATE: 102 BPM | TEMPERATURE: 98 F | BODY MASS INDEX: 29.21 KG/M2 | RESPIRATION RATE: 16 BRPM | SYSTOLIC BLOOD PRESSURE: 126 MMHG | WEIGHT: 171.08 LBS | DIASTOLIC BLOOD PRESSURE: 83 MMHG | OXYGEN SATURATION: 99 %

## 2022-12-14 LAB
ALBUMIN SERPL ELPH-MCNC: 4.4 G/DL
ALP BLD-CCNC: 84 U/L
ALT SERPL-CCNC: 14 U/L
ANION GAP SERPL CALC-SCNC: 12 MMOL/L
AST SERPL-CCNC: 16 U/L
BASOPHILS # BLD AUTO: 0.05 K/UL — SIGNIFICANT CHANGE UP (ref 0–0.2)
BASOPHILS NFR BLD AUTO: 1.3 % — SIGNIFICANT CHANGE UP (ref 0–2)
BILIRUB SERPL-MCNC: 0.4 MG/DL
BUN SERPL-MCNC: 11 MG/DL
CALCIUM SERPL-MCNC: 9 MG/DL
CEA SERPL-MCNC: 21.2 NG/ML
CHLORIDE SERPL-SCNC: 103 MMOL/L
CO2 SERPL-SCNC: 24 MMOL/L
CREAT SERPL-MCNC: 0.86 MG/DL
EGFR: 74 ML/MIN/1.73M2
EOSINOPHIL # BLD AUTO: 0.01 K/UL — SIGNIFICANT CHANGE UP (ref 0–0.5)
EOSINOPHIL NFR BLD AUTO: 0.3 % — SIGNIFICANT CHANGE UP (ref 0–6)
GLUCOSE SERPL-MCNC: 95 MG/DL
HCT VFR BLD CALC: 39.3 % — SIGNIFICANT CHANGE UP (ref 34.5–45)
HGB BLD-MCNC: 13 G/DL — SIGNIFICANT CHANGE UP (ref 11.5–15.5)
IMM GRANULOCYTES NFR BLD AUTO: 0.3 % — SIGNIFICANT CHANGE UP (ref 0–0.9)
LYMPHOCYTES # BLD AUTO: 1.6 K/UL — SIGNIFICANT CHANGE UP (ref 1–3.3)
LYMPHOCYTES # BLD AUTO: 40.9 % — SIGNIFICANT CHANGE UP (ref 13–44)
MCHC RBC-ENTMCNC: 33.1 G/DL — SIGNIFICANT CHANGE UP (ref 32–36)
MCHC RBC-ENTMCNC: 34.6 PG — HIGH (ref 27–34)
MCV RBC AUTO: 104.5 FL — HIGH (ref 80–100)
MONOCYTES # BLD AUTO: 0.37 K/UL — SIGNIFICANT CHANGE UP (ref 0–0.9)
MONOCYTES NFR BLD AUTO: 9.5 % — SIGNIFICANT CHANGE UP (ref 2–14)
NEUTROPHILS # BLD AUTO: 1.87 K/UL — SIGNIFICANT CHANGE UP (ref 1.8–7.4)
NEUTROPHILS NFR BLD AUTO: 47.7 % — SIGNIFICANT CHANGE UP (ref 43–77)
NRBC # BLD: 0 /100 WBCS — SIGNIFICANT CHANGE UP (ref 0–0)
PLATELET # BLD AUTO: 203 K/UL — SIGNIFICANT CHANGE UP (ref 150–400)
POTASSIUM SERPL-SCNC: 3.9 MMOL/L
PROT SERPL-MCNC: 6.3 G/DL
RBC # BLD: 3.76 M/UL — LOW (ref 3.8–5.2)
RBC # FLD: 13.8 % — SIGNIFICANT CHANGE UP (ref 10.3–14.5)
SODIUM SERPL-SCNC: 140 MMOL/L
WBC # BLD: 3.91 K/UL — SIGNIFICANT CHANGE UP (ref 3.8–10.5)
WBC # FLD AUTO: 3.91 K/UL — SIGNIFICANT CHANGE UP (ref 3.8–10.5)

## 2022-12-14 PROCEDURE — 99214 OFFICE O/P EST MOD 30 MIN: CPT

## 2022-12-15 ENCOUNTER — APPOINTMENT (OUTPATIENT)
Dept: WOUND CARE | Facility: CLINIC | Age: 67
End: 2022-12-15

## 2022-12-15 ENCOUNTER — APPOINTMENT (OUTPATIENT)
Dept: PSYCHIATRY | Facility: CLINIC | Age: 67
End: 2022-12-15

## 2022-12-15 VITALS — TEMPERATURE: 97.4 F | WEIGHT: 170 LBS | HEIGHT: 64 IN | BODY MASS INDEX: 29.02 KG/M2

## 2022-12-15 DIAGNOSIS — S21.009A UNSPECIFIED OPEN WOUND OF UNSPECIFIED BREAST, INITIAL ENCOUNTER: ICD-10-CM

## 2022-12-15 PROCEDURE — 99213 OFFICE O/P EST LOW 20 MIN: CPT

## 2022-12-15 RX ORDER — MIRTAZAPINE 30 MG/1
30 TABLET, FILM COATED ORAL
Qty: 30 | Refills: 0 | Status: DISCONTINUED | COMMUNITY
Start: 2022-11-17 | End: 2022-12-15

## 2022-12-15 NOTE — HISTORY OF PRESENT ILLNESS
[de-identified] : The patient first noticed something in her left breast in .  She underwent an excisional biopsy and reports that it was a "benign cyst."\par \par The patient reports that beginning several years ago, she noticed "something" in the exact place where the prior cyst had been, and thought this was perhaps another cyst.  She chose to follow it.  Approximately 1-2 years ago, this caused skin breakdown and the patient reports she thought it was an abscess and was applying antibiotics on it.\par \par More recently, she presented to her psychiatry nurse practitioner and showed her the left breast lesion, was told this was breast cancer.  She consequently presented to a surgical physician at [facility].  That physician apparently performed a chest x-ray which was reportedly normal, an EKG which was normal, and a mammogram and sonogram (the results of which I do not have).  The patient was offered to have an excisional biopsy the following day, which she did on 2002 at [facility] in [place].  The outside pathology report shows invasive duct carcinoma, poorly differentiated, [place] score 8, measuring 4 cm, with angiolymphatic space invasion present, and carcinoma extending to the epidermis associated with ulceration, as well as to the peripheral and deep margins, estrogen receptor returned positive, progesterone receptor positive, and HER-2/samson negative (0), and Ki-67 was 8%.  The patient was last seen yesterday by her surgeon and informed him she wished to come to [place] for her medical care and reports that he did not speak afterwards but walked out of the room.  A nurse in his practice packed the wound site per patient report.\par \par The patient is here today in consultation regarding further treatment recommendations.  She notes a good appetite, stable weight, and excellent performance status.\par \par The patient reports that her  was the  on  at the [email or web address] site and subsequently developed pancreatic cancer, with the patient reporting he  a horrible death and his treatment team treated him very very poorly.  Subsequently her son also developed an unspecified illness and presented for medical care, and was sent out by the team reportedly thinking he was drug-seeking, and subsequently he suffered a severe medical illness; he then became an alcoholic and  of unspecified causes soon afterwards.  The patient reports that this has made her feel "like I am going crazy."  She reports that she has a generalized anxiety disorder for which she is followed by a nurse practitioner who writes prescriptions as well as a /therapist.  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  She reports that is why she has not seen a physician or sought medical care for over 10 years and has not had a mammogram for at least that many years.\par \par The patient had PET/CT with showed metastatic disease of the bones.  The patient was started on Anastrozole and palbociclib on 22.\par  [de-identified] : The patient presents for follow up.  \par \par Started anastrozole and palbociclib on 7/25/22.\par \par Currently on D2 of palbo, tolerating well. \par \par She denies any treatment related side effects.\par \par She states her wound continues to improve and is no longer opened. \par \par She states she is seeing a new physiatrist tomorrow.  She is in process of starting a new medication, however is going for second opinion prior to starting. \par \par She denies any other complaints.  Notes a good appetite, stable weight and excellent performance status. \par \par PET/CT scan, 7/11/22- IMPRESSION: Abnormal FDG-PET/CT scan.\par \par 1. FDG-avid left breast masses, medial aspect upper left breast, and central aspect left breast, are compatible with malignancy. The mass in the medial aspect of the upper left breast involves the overlying skin and is inseparable from the chest wall musculature. The mass in the central left breast is not well delineated on CT. Further evaluation may be performed with MRI.\par 2. FDG-avid left axillary lymph node metastases.\par 3. FDG-avid mixed lytic and sclerotic bone metastases in axial skeleton and proximal right femur.\par 4. Large hiatal hernia with distended esophagus, without associated hypermetabolism. Please correlate clinically.\par \par

## 2022-12-15 NOTE — ASSESSMENT
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 6/2022, er /pr+ ki67 favorable, her2- on Ibrance markers down\par ulcer smaller, vashe and betadine and foam\par \par supplies  re-ordered\par  met stage 4 breast cancer/ 2010 tahbsa ovaries\par on anastrazole, ibrance, awaiting radiation eval\par \par  datacomplexity- mod  lab, xr, old rec, test resultsreview,, visualize imagecptreview previous\par risk -mod surgery\par prognosis- stage 4 \par assess-advance directive- lost  911died in 2015\par  and son in war, very sad, not suicidal, on meds\par  \par 12/15 \par activity- nl\par  rec: 1-pain- controlled, 2-smell- none 3-drainage- supplies ordered, 4-bleeding-no\par  5- size- palpable mass, 6- itch- none\par education discussed wound care\par discussed nutrition 170 lb\par no cellulitis currently\par supplies ordered foam/   betadine\par staying at friend home in [place]  , lives in [place] will travel and return for reassessment\par \par 11/15/2022\par patient here for follow up on left breast wound, 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\par \par  \par

## 2022-12-15 NOTE — PHYSICAL EXAM
[Fully active, able to carry on all pre-disease performance without restriction] : Status 0 - Fully active, able to carry on all pre-disease performance without restriction [Normal] : affect appropriate [de-identified] : Left breast much improved / closed, smaller, with good granulation tissue.  No palpable axillary nodes.  Right breast with no discrete palpable mass, no palpable axillary nodes.

## 2022-12-15 NOTE — PHYSICAL EXAM
[Normal Breath Sounds] : Normal breath sounds [Skin Ulcer] : ulcer [Oriented to Person] : oriented to person [Oriented to Place] : oriented to place [Oriented to Time] : oriented to time [Calm] : calm [Please See PDF for [employer] Analytics] : Please See PDF for [employer]. [JVD] : no jugular venous distention  [de-identified] : nad [de-identified] : brandon [de-identified] : left breast ulcer [FreeTextEntry1] : no wrist edema [FreeTextEntry2] : non palp axilla [de-identified] : nl [de-identified] : intact rom

## 2022-12-15 NOTE — DATA REVIEWED
[FreeTextEntry1] : \par EXAM: [id number] - PETCT SKUL-THI ONC FDG INIT - ORDERED BY: AMITA GONSALVES\par PROCEDURE DATE: 07/11/2022\par INTERPRETATION: PROCEDURE: PET/CT SKULL BASE-MID THIGH IMAGING\par CLINICAL INFORMATION: 67-year-old female referred for evaluation of locally advanced left breast cancer, upper inner left breast (ER/[place] positive) PET/CT is done as part of the initial treatment strategy evaluation.\par RADIOPHARMACEUTICAL: 12.93 mCi F-18, FDG, I.V.\par \par TECHNIQUE: Fasting blood sugar measured prior to injection of radiopharmaceutical was 82 mg/dl. Following intravenous injection of the radiopharmaceutical and an uptake period of approximately 70 minutes, FDG-PET/CT was obtained on a [employer] Discovery 710 from skull base to mid thigh. Oral contrast was given during the uptake period. CT was performed during shallow respiration. The CT protocol was optimized for PET attenuation correction and anatomic localization. The CT protocol was not designed to produce and cannot replace state-of-the-art diagnostic CT images with specific imaging protocols for different body parts and indications. Images were reviewed on a dedicated workstation using multiplanar reconstruction.\par \par The standardized uptake values (SUV) are normalized to patient body weight and indicate the highest activity concentration (SUVmax) in a given disease site. All image numbers refer to axial image number.\par \par COMPARISON: No prior PET/CT.\par OTHER STUDIES USED FOR CORRELATION: None available.\par \par FINDINGS:\par HEAD/NECK: Physiologic FDG activity in visualized brain, head, and neck.\par CHEST: There is a an FDG-avid mass in the medial aspect of the upper left breast which measures 4.5 x 3.5 cm, SUV 30.4 (image 87). The most intense portion of the mass is in the periphery, anteriorly. The mass involves the overlying skin, and is inseparable from the chest wall musculature. There is a separate, difficult to delineate FDG-avid mass in the central left breast (SUV 25.2; image 97).\par There are a few FDG-avid left axillary lymph nodes which measure up to 1.5 x 1.3 cm, SUV 18.1 (image 86).LUNGS: No abnormal FDG activity. No lung nodule.PLEURA/PERICARDIUM: No abnormal FDG activity. No effusion.HEPATOBILIARY/PANCREAS: Physiologic FDG activity. Liver SUV mean is 2.9.\par SPLEEN: Physiologic FDG activity. Normal in size.\par ADRENAL GLANDS: No abnormal FDG activity. No nodule.KIDNEYS/URINARY BLADDER: Physiologic excreted FDG activity.REPRODUCTIVE ORGANS: No abnormal FDG activity.\par ABDOMINOPELVIC NODES: No enlarged or FDG-avid lymph node.BOWEL/PERITONEUM/MESENTERY: No abnormal FDG activity. Large hiatal hernia with distended esophagus without associated hypermetabolism.\par BONES/SOFT TISSUES: Several FDG-avid mixed lytic and sclerotic lesions in the cervical, thoracic, and lumbar spine, bilateral pelvis, sternum, several bilateral ribs, and proximal right femur, including right intertrochanteric region and right subtrochanteric region. For reference, a lesion in the posterior right iliac bone measures 3.6 x 1.8 cm, SUV 13.3 (image 188). The cortex of the long bones is intact on CT.\par \par IMPRESSION: Abnormal FDG-PET/CT scan.\par 1. FDG-avid left breast masses, medial aspect upper left breast, and central aspect left breast, are compatible with malignancy. The mass in the medial aspect of the upper left breast involves the overlying skin and is inseparable from the chest wall musculature. The mass in the central left breast is not well delineated on CT. Further evaluation may be performed with MRI.\par 2. FDG-avid left axillary lymph node metastases.\par 3. FDG-avid mixed lytic nd sclerotic bone metastases in axial skeleton and proximal right femur.\par 4. Large hiatal hernia with distended esophagus, without associated hypermetabolism. Please correlate clinically.Findings emailed to Dr. Amita Gonsalves.\par \par --- End of Report ---\par \par PING REYNA MD; Attending Nuclear Medicine\par This document has been electronically signed. Jul 12 2022 1:44PM

## 2022-12-16 LAB — CANCER AG27-29 SERPL-ACNC: 41.2 U/ML

## 2023-01-10 ENCOUNTER — RESULT REVIEW (OUTPATIENT)
Age: 68
End: 2023-01-10

## 2023-01-10 ENCOUNTER — APPOINTMENT (OUTPATIENT)
Dept: WOUND CARE | Facility: CLINIC | Age: 68
End: 2023-01-10
Payer: MEDICARE

## 2023-01-10 ENCOUNTER — APPOINTMENT (OUTPATIENT)
Dept: HEMATOLOGY ONCOLOGY | Facility: CLINIC | Age: 68
End: 2023-01-10
Payer: MEDICARE

## 2023-01-10 VITALS
HEART RATE: 88 BPM | WEIGHT: 171.3 LBS | BODY MASS INDEX: 29.24 KG/M2 | SYSTOLIC BLOOD PRESSURE: 122 MMHG | HEIGHT: 64 IN | TEMPERATURE: 96.8 F | OXYGEN SATURATION: 96 % | DIASTOLIC BLOOD PRESSURE: 87 MMHG | RESPIRATION RATE: 16 BRPM

## 2023-01-10 LAB
BASOPHILS # BLD AUTO: 0.05 K/UL — SIGNIFICANT CHANGE UP (ref 0–0.2)
BASOPHILS NFR BLD AUTO: 1.2 % — SIGNIFICANT CHANGE UP (ref 0–2)
EOSINOPHIL # BLD AUTO: 0.02 K/UL — SIGNIFICANT CHANGE UP (ref 0–0.5)
EOSINOPHIL NFR BLD AUTO: 0.5 % — SIGNIFICANT CHANGE UP (ref 0–6)
HCT VFR BLD CALC: 38.8 % — SIGNIFICANT CHANGE UP (ref 34.5–45)
HGB BLD-MCNC: 13 G/DL — SIGNIFICANT CHANGE UP (ref 11.5–15.5)
IMM GRANULOCYTES NFR BLD AUTO: 0.2 % — SIGNIFICANT CHANGE UP (ref 0–0.9)
LYMPHOCYTES # BLD AUTO: 1.79 K/UL — SIGNIFICANT CHANGE UP (ref 1–3.3)
LYMPHOCYTES # BLD AUTO: 43.7 % — SIGNIFICANT CHANGE UP (ref 13–44)
MCHC RBC-ENTMCNC: 33.5 G/DL — SIGNIFICANT CHANGE UP (ref 32–36)
MCHC RBC-ENTMCNC: 34.9 PG — HIGH (ref 27–34)
MCV RBC AUTO: 104.3 FL — HIGH (ref 80–100)
MONOCYTES # BLD AUTO: 0.38 K/UL — SIGNIFICANT CHANGE UP (ref 0–0.9)
MONOCYTES NFR BLD AUTO: 9.3 % — SIGNIFICANT CHANGE UP (ref 2–14)
NEUTROPHILS # BLD AUTO: 1.85 K/UL — SIGNIFICANT CHANGE UP (ref 1.8–7.4)
NEUTROPHILS NFR BLD AUTO: 45.1 % — SIGNIFICANT CHANGE UP (ref 43–77)
NRBC # BLD: 0 /100 WBCS — SIGNIFICANT CHANGE UP (ref 0–0)
PLATELET # BLD AUTO: 196 K/UL — SIGNIFICANT CHANGE UP (ref 150–400)
RBC # BLD: 3.72 M/UL — LOW (ref 3.8–5.2)
RBC # FLD: 13.6 % — SIGNIFICANT CHANGE UP (ref 10.3–14.5)
WBC # BLD: 4.1 K/UL — SIGNIFICANT CHANGE UP (ref 3.8–10.5)
WBC # FLD AUTO: 4.1 K/UL — SIGNIFICANT CHANGE UP (ref 3.8–10.5)

## 2023-01-10 PROCEDURE — 99214 OFFICE O/P EST MOD 30 MIN: CPT

## 2023-01-10 NOTE — PHYSICAL EXAM
[Fully active, able to carry on all pre-disease performance without restriction] : Status 0 - Fully active, able to carry on all pre-disease performance without restriction [Normal] : affect appropriate [de-identified] : Left breast much improved, small opening presents, with good granulation tissue.  No palpable axillary nodes.  Right breast with no discrete palpable mass, no palpable axillary nodes.

## 2023-01-10 NOTE — HISTORY OF PRESENT ILLNESS
[de-identified] : The patient first noticed something in her left breast in .  She underwent an excisional biopsy and reports that it was a "benign cyst."\par \par The patient reports that beginning several years ago, she noticed "something" in the exact place where the prior cyst had been, and thought this was perhaps another cyst.  She chose to follow it.  Approximately 1-2 years ago, this caused skin breakdown and the patient reports she thought it was an abscess and was applying antibiotics on it.\par \par More recently, she presented to her psychiatry nurse practitioner and showed her the left breast lesion, was told this was breast cancer.  She consequently presented to a surgical physician at [facility].  That physician apparently performed a chest x-ray which was reportedly normal, an EKG which was normal, and a mammogram and sonogram (the results of which I do not have).  The patient was offered to have an excisional biopsy the following day, which she did on 2002 at [facility] in [place].  The outside pathology report shows invasive duct carcinoma, poorly differentiated, [place] score 8, measuring 4 cm, with angiolymphatic space invasion present, and carcinoma extending to the epidermis associated with ulceration, as well as to the peripheral and deep margins, estrogen receptor returned positive, progesterone receptor positive, and HER-2/samson negative (0), and Ki-67 was 8%.  The patient was last seen yesterday by her surgeon and informed him she wished to come to [place] for her medical care and reports that he did not speak afterwards but walked out of the room.  A nurse in his practice packed the wound site per patient report.\par \par The patient is here today in consultation regarding further treatment recommendations.  She notes a good appetite, stable weight, and excellent performance status.\par \par The patient reports that her  was the  on  at the [facility] site and subsequently developed pancreatic cancer, with the patient reporting he  a horrible death and his treatment team treated him very very poorly.  Subsequently her son also developed an unspecified illness and presented for medical care, and was sent out by the team reportedly thinking he was drug-seeking, and subsequently he suffered a severe medical illness; he then became an alcoholic and  of unspecified causes soon afterwards.  The patient reports that this has made her feel "like I am going crazy."  She reports that she has a generalized anxiety disorder for which she is followed by a nurse practitioner who writes prescriptions as well as a /therapist.  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  She reports that is why she has not seen a physician or sought medical care for over 10 years and has not had a mammogram for at least that many years.\par \par The patient had PET/CT with showed metastatic disease of the bones.  The patient was started on Anastrozole and palbociclib on 22.\par  [de-identified] : The patient presents for follow up.  \par \par Started anastrozole and palbociclib on 7/25/22.\par \par Today is day #1 of next cycle of palbo.\par \par She continues to deny any treatment related side effects.\par \par She states her wound continues to improve, almost completely closed.\par \par She denies any other complaints.  Notes a good appetite, stable weight and excellent performance status. \par \par PET/CT scan, 7/11/22- IMPRESSION: Abnormal FDG-PET/CT scan.\par \par 1. FDG-avid left breast masses, medial aspect upper left breast, and central aspect left breast, are compatible with malignancy. The mass in the medial aspect of the upper left breast involves the overlying skin and is inseparable from the chest wall musculature. The mass in the central left breast is not well delineated on CT. Further evaluation may be performed with MRI.\par 2. FDG-avid left axillary lymph node metastases.\par 3. FDG-avid mixed lytic and sclerotic bone metastases in axial skeleton and proximal right femur.\par 4. Large hiatal hernia with distended esophagus, without associated hypermetabolism. Please correlate clinically.\par \par

## 2023-01-11 LAB
ALBUMIN SERPL ELPH-MCNC: 4.3 G/DL
ALP BLD-CCNC: 76 U/L
ALT SERPL-CCNC: 15 U/L
ANION GAP SERPL CALC-SCNC: 13 MMOL/L
AST SERPL-CCNC: 15 U/L
BILIRUB SERPL-MCNC: 0.2 MG/DL
BUN SERPL-MCNC: 8 MG/DL
CALCIUM SERPL-MCNC: 9.6 MG/DL
CANCER AG27-29 SERPL-ACNC: 38.1 U/ML
CEA SERPL-MCNC: 16 NG/ML
CHLORIDE SERPL-SCNC: 106 MMOL/L
CO2 SERPL-SCNC: 26 MMOL/L
CREAT SERPL-MCNC: 0.72 MG/DL
EGFR: 92 ML/MIN/1.73M2
GLUCOSE SERPL-MCNC: 98 MG/DL
POTASSIUM SERPL-SCNC: 3.9 MMOL/L
PROT SERPL-MCNC: 6.3 G/DL
SODIUM SERPL-SCNC: 144 MMOL/L

## 2023-01-12 ENCOUNTER — APPOINTMENT (OUTPATIENT)
Dept: NUCLEAR MEDICINE | Facility: IMAGING CENTER | Age: 68
End: 2023-01-12
Payer: MEDICARE

## 2023-01-12 ENCOUNTER — OUTPATIENT (OUTPATIENT)
Dept: OUTPATIENT SERVICES | Facility: HOSPITAL | Age: 68
LOS: 1 days | End: 2023-01-12
Payer: MEDICARE

## 2023-01-12 ENCOUNTER — TRANSCRIPTION ENCOUNTER (OUTPATIENT)
Age: 68
End: 2023-01-12

## 2023-01-12 DIAGNOSIS — C50.919 MALIGNANT NEOPLASM OF UNSPECIFIED SITE OF UNSPECIFIED FEMALE BREAST: ICD-10-CM

## 2023-01-12 PROCEDURE — A9552: CPT

## 2023-01-12 PROCEDURE — 78815 PET IMAGE W/CT SKULL-THIGH: CPT | Mod: 26,PS,MH

## 2023-01-12 PROCEDURE — 78815 PET IMAGE W/CT SKULL-THIGH: CPT

## 2023-01-19 NOTE — PHYSICAL EXAM
[Normal Breath Sounds] : Normal breath sounds [Skin Ulcer] : ulcer [Oriented to Person] : oriented to person [Oriented to Place] : oriented to place [Oriented to Time] : oriented to time [Calm] : calm [Please See PDF for [employer] Analytics] : Please See PDF for [employer]. [JVD] : no jugular venous distention  [de-identified] : nad [de-identified] : brandon [de-identified] : left breast ulcer [FreeTextEntry1] : no wrist edema [FreeTextEntry2] : non palp axilla [de-identified] : nl [de-identified] : intact rom

## 2023-01-19 NOTE — DATA REVIEWED
[FreeTextEntry1] : \par EXAM: [id number] - PETCT SKUL-THI ONC FDG INIT - ORDERED BY: AMITA GONSALVES\par PROCEDURE DATE: 07/11/2022\par INTERPRETATION: PROCEDURE: PET/CT SKULL BASE-MID THIGH IMAGING\par CLINICAL INFORMATION: 67-year-old female referred for evaluation of locally advanced left breast cancer, upper inner left breast (ER/[place] positive) PET/CT is done as part of the initial treatment strategy evaluation.\par RADIOPHARMACEUTICAL: 12.93 mCi F-18, FDG, I.V.\par \par TECHNIQUE: Fasting blood sugar measured prior to injection of radiopharmaceutical was 82 mg/dl. Following intravenous injection of the radiopharmaceutical and an uptake period of approximately 70 minutes, FDG-PET/CT was obtained on a [employer] Discovery 710 from skull base to mid thigh. Oral contrast was given during the uptake period. CT was performed during shallow respiration. The CT protocol was optimized for PET attenuation correction and anatomic localization. The CT protocol was not designed to produce and cannot replace state-of-the-art diagnostic CT images with specific imaging protocols for different body parts and indications. Images were reviewed on a dedicated workstation using multiplanar reconstruction.\par \par The standardized uptake values (SUV) are normalized to patient body weight and indicate the highest activity concentration (SUVmax) in a given disease site. All image numbers refer to axial image number.\par \par COMPARISON: No prior PET/CT.\par OTHER STUDIES USED FOR CORRELATION: None available.\par \par FINDINGS:\par HEAD/NECK: Physiologic FDG activity in visualized brain, head, and neck.\par CHEST: There is a an FDG-avid mass in the medial aspect of the upper left breast which measures 4.5 x 3.5 cm, SUV 30.4 (image 87). The most intense portion of the mass is in the periphery, anteriorly. The mass involves the overlying skin, and is inseparable from the chest wall musculature. There is a separate, difficult to delineate FDG-avid mass in the central left breast (SUV 25.2; image 97).\par There are a few FDG-avid left axillary lymph nodes which measure up to 1.5 x 1.3 cm, SUV 18.1 (image 86).LUNGS: No abnormal FDG activity. No lung nodule.PLEURA/PERICARDIUM: No abnormal FDG activity. No effusion.HEPATOBILIARY/PANCREAS: Physiologic FDG activity. Liver SUV mean is 2.9.\par SPLEEN: Physiologic FDG activity. Normal in size.\par ADRENAL GLANDS: No abnormal FDG activity. No nodule.KIDNEYS/URINARY BLADDER: Physiologic excreted FDG activity.REPRODUCTIVE ORGANS: No abnormal FDG activity.\par ABDOMINOPELVIC NODES: No enlarged or FDG-avid lymph node.BOWEL/PERITONEUM/MESENTERY: No abnormal FDG activity. Large hiatal hernia with distended esophagus without associated hypermetabolism.\par BONES/SOFT TISSUES: Several FDG-avid mixed lytic and sclerotic lesions in the cervical, thoracic, and lumbar spine, bilateral pelvis, sternum, several bilateral ribs, and proximal right femur, including right intertrochanteric region and right subtrochanteric region. For reference, a lesion in the posterior right iliac bone measures 3.6 x 1.8 cm, SUV 13.3 (image 188). The cortex of the long bones is intact on CT.\par \par IMPRESSION: Abnormal FDG-PET/CT scan.\par 1. FDG-avid left breast masses, medial aspect upper left breast, and central aspect left breast, are compatible with malignancy. The mass in the medial aspect of the upper left breast involves the overlying skin and is inseparable from the chest wall musculature. The mass in the central left breast is not well delineated on CT. Further evaluation may be performed with MRI.\par 2. FDG-avid left axillary lymph node metastases.\par 3. FDG-avid mixed lytic nd sclerotic bone metastases in axial skeleton and proximal right femur.\par 4. Large hiatal hernia with distended esophagus, without associated hypermetabolism. Please correlate clinically.Findings emailed to Dr. Amita Gonsalves.\par \par --- End of Report ---\par \par PING REYNA MD; Attending Nuclear Medicine\par This document has been electronically signed. Jul 12 2022 1:44PM

## 2023-01-19 NOTE — PLAN
[FreeTextEntry1] : 11/15/2022\par Plan;\par paint wound on left breast with betadine \par apply adhesive foam\par f/u in 4 weeks\par \par 01/10/2023\par Plan:continue betadine foam local wound care\par followup 1 month\par

## 2023-01-19 NOTE — ASSESSMENT
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 6/2022, er /pr+ ki67 favorable, her2- on Ibrance markers down\par ulcer smaller, vashe and betadine and foam\par has supplies\par supplies  re-ordered\par  met stage 4 breast cancer/ 2010 tahbsa ovaries\par on anastrazole, ibrance, awaiting radiation eval\par \par  datacomplexity- mod  lab, xr, old rec, test resultsreview,, visualize imagecptreview previous\par risk -mod surgery\par prognosis- stage 4 \par assess-advance directive- lost  911died in 2015\par  and son in war, very sad, not suicidal, on meds\par  \par 12/15 \par activity- nl\par  rec: 1-pain- controlled, 2-smell- none 3-drainage- supplies ordered, 4-bleeding-no\par  5- size- palpable mass, 6- itch- none\par education discussed wound care\par discussed nutrition 170 lb\par no cellulitis currently\par supplies ordered foam/   betadine\par staying at friend home in [place]  , lives in [place] will travel and return for reassessment\par \par 11/15/2022\par patient here for follow up on left breast wound, 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

## 2023-01-30 ENCOUNTER — OUTPATIENT (OUTPATIENT)
Dept: OUTPATIENT SERVICES | Facility: HOSPITAL | Age: 68
LOS: 1 days | Discharge: HOME | End: 2023-01-30

## 2023-01-30 DIAGNOSIS — C50.919 MALIGNANT NEOPLASM OF UNSPECIFIED SITE OF UNSPECIFIED FEMALE BREAST: ICD-10-CM

## 2023-02-07 ENCOUNTER — RESULT REVIEW (OUTPATIENT)
Age: 68
End: 2023-02-07

## 2023-02-07 ENCOUNTER — APPOINTMENT (OUTPATIENT)
Dept: HEMATOLOGY ONCOLOGY | Facility: CLINIC | Age: 68
End: 2023-02-07
Payer: MEDICARE

## 2023-02-07 ENCOUNTER — APPOINTMENT (OUTPATIENT)
Dept: WOUND CARE | Facility: CLINIC | Age: 68
End: 2023-02-07
Payer: MEDICARE

## 2023-02-07 VITALS
HEART RATE: 107 BPM | WEIGHT: 169.76 LBS | OXYGEN SATURATION: 99 % | BODY MASS INDEX: 28.98 KG/M2 | HEIGHT: 63.98 IN | TEMPERATURE: 97.2 F | RESPIRATION RATE: 18 BRPM | DIASTOLIC BLOOD PRESSURE: 81 MMHG | SYSTOLIC BLOOD PRESSURE: 133 MMHG

## 2023-02-07 LAB
ALBUMIN SERPL ELPH-MCNC: 4.3 G/DL
ALP BLD-CCNC: 81 U/L
ALT SERPL-CCNC: 17 U/L
ANION GAP SERPL CALC-SCNC: 13 MMOL/L
AST SERPL-CCNC: 17 U/L
BASOPHILS # BLD AUTO: 0.04 K/UL — SIGNIFICANT CHANGE UP (ref 0–0.2)
BASOPHILS NFR BLD AUTO: 0.9 % — SIGNIFICANT CHANGE UP (ref 0–2)
BILIRUB SERPL-MCNC: 0.2 MG/DL
BUN SERPL-MCNC: 17 MG/DL
CALCIUM SERPL-MCNC: 9.7 MG/DL
CEA SERPL-MCNC: 13.3 NG/ML
CHLORIDE SERPL-SCNC: 103 MMOL/L
CO2 SERPL-SCNC: 25 MMOL/L
CREAT SERPL-MCNC: 0.72 MG/DL
EGFR: 92 ML/MIN/1.73M2
EOSINOPHIL # BLD AUTO: 0.03 K/UL — SIGNIFICANT CHANGE UP (ref 0–0.5)
EOSINOPHIL NFR BLD AUTO: 0.7 % — SIGNIFICANT CHANGE UP (ref 0–6)
GLUCOSE SERPL-MCNC: 109 MG/DL
HCT VFR BLD CALC: 38.6 % — SIGNIFICANT CHANGE UP (ref 34.5–45)
HGB BLD-MCNC: 12.9 G/DL — SIGNIFICANT CHANGE UP (ref 11.5–15.5)
IMM GRANULOCYTES NFR BLD AUTO: 0.4 % — SIGNIFICANT CHANGE UP (ref 0–0.9)
LYMPHOCYTES # BLD AUTO: 1.46 K/UL — SIGNIFICANT CHANGE UP (ref 1–3.3)
LYMPHOCYTES # BLD AUTO: 32.4 % — SIGNIFICANT CHANGE UP (ref 13–44)
MCHC RBC-ENTMCNC: 33.4 G/DL — SIGNIFICANT CHANGE UP (ref 32–36)
MCHC RBC-ENTMCNC: 34.4 PG — HIGH (ref 27–34)
MCV RBC AUTO: 102.9 FL — HIGH (ref 80–100)
MONOCYTES # BLD AUTO: 0.44 K/UL — SIGNIFICANT CHANGE UP (ref 0–0.9)
MONOCYTES NFR BLD AUTO: 9.8 % — SIGNIFICANT CHANGE UP (ref 2–14)
NEUTROPHILS # BLD AUTO: 2.52 K/UL — SIGNIFICANT CHANGE UP (ref 1.8–7.4)
NEUTROPHILS NFR BLD AUTO: 55.8 % — SIGNIFICANT CHANGE UP (ref 43–77)
NRBC # BLD: 0 /100 WBCS — SIGNIFICANT CHANGE UP (ref 0–0)
PLATELET # BLD AUTO: 214 K/UL — SIGNIFICANT CHANGE UP (ref 150–400)
POTASSIUM SERPL-SCNC: 4.6 MMOL/L
PROT SERPL-MCNC: 6.4 G/DL
RBC # BLD: 3.75 M/UL — LOW (ref 3.8–5.2)
RBC # FLD: 13.3 % — SIGNIFICANT CHANGE UP (ref 10.3–14.5)
SODIUM SERPL-SCNC: 141 MMOL/L
WBC # BLD: 4.51 K/UL — SIGNIFICANT CHANGE UP (ref 3.8–10.5)
WBC # FLD AUTO: 4.51 K/UL — SIGNIFICANT CHANGE UP (ref 3.8–10.5)

## 2023-02-07 PROCEDURE — 99213 OFFICE O/P EST LOW 20 MIN: CPT

## 2023-02-07 PROCEDURE — 99214 OFFICE O/P EST MOD 30 MIN: CPT

## 2023-02-07 NOTE — PHYSICAL EXAM
[Fully active, able to carry on all pre-disease performance without restriction] : Status 0 - Fully active, able to carry on all pre-disease performance without restriction [Normal] : affect appropriate [de-identified] : Left breast much improved, wound site now closed with good granulation tissue and underlying density across approx 4 -5 cm .  No palpable axillary nodes.  Right breast with no discrete palpable mass, no palpable axillary nodes.

## 2023-02-07 NOTE — HISTORY OF PRESENT ILLNESS
[FreeTextEntry1] : location  left breast mass with ulceration\par severity no fever , on therapy\par now no drainage - closed\par \par \par timing/duration months, postponed treatment during covid\par quality some oozing, drainage no pain\par other on rx awaiting  radiation, has met to bones\par  PET/CT with showed metastatic disease of the bones. The patient was started on Anastrozole and palbociclib on 7/25/22.  Started anastrozole and palbociclib on 7/25/22.\par Due to start C2 D1 today.  \par \par   history of depression, which she is followed by a psychiatrist who manages her medications. \par  occasional bilateral hip pain when getting up after sitting long periods of time, stable. Pain resolves after moving.\par   1. FDG-avid left breast masses, medial aspect upper left breast, and central aspect left breast, are compatible with malignancy. The mass in the medial aspect of the upper left breast involves the overlying skin and is inseparable from the chest wall musculature. The mass in the central left breast is not well delineated on CT. Further evaluation may be performed with MRI.\par 2. FDG-avid left axillary lymph node metastases.\par 3. FDG-avid mixed lytic and sclerotic bone metastases in axial skeleton and proximal right femur.\par 4. Large hiatal hernia with distended esophagus, without associated hypermetabolism. Please correlate clinically.\par \par

## 2023-02-07 NOTE — DATA REVIEWED
[FreeTextEntry1] : \par EXAM: [id number] - PETCT SKUL-THI ONC FDG INIT - ORDERED BY: AMITA GONSALVES\par PROCEDURE DATE: 07/11/2022\par INTERPRETATION: PROCEDURE: PET/CT SKULL BASE-MID THIGH IMAGING\par CLINICAL INFORMATION: 67-year-old female referred for evaluation of locally advanced left breast cancer, upper inner left breast (ER/[place] positive) PET/CT is done as part of the initial treatment strategy evaluation.\par RADIOPHARMACEUTICAL: 12.93 mCi F-18, FDG, I.V.\par \par TECHNIQUE: Fasting blood sugar measured prior to injection of radiopharmaceutical was 82 mg/dl. Following intravenous injection of the radiopharmaceutical and an uptake period of approximately 70 minutes, FDG-PET/CT was obtained on a [employer] Discovery 710 from skull base to mid thigh. Oral contrast was given during the uptake period. CT was performed during shallow respiration. The CT protocol was optimized for PET attenuation correction and anatomic localization. The CT protocol was not designed to produce and cannot replace state-of-the-art diagnostic CT images with specific imaging protocols for different body parts and indications. Images were reviewed on a dedicated workstation using multiplanar reconstruction.\par \par The standardized uptake values (SUV) are normalized to patient body weight and indicate the highest activity concentration (SUVmax) in a given disease site. All image numbers refer to axial image number.\par \par COMPARISON: No prior PET/CT.\par OTHER STUDIES USED FOR CORRELATION: None available.\par \par FINDINGS:\par HEAD/NECK: Physiologic FDG activity in visualized brain, head, and neck.\par CHEST: There is a an FDG-avid mass in the medial aspect of the upper left breast which measures 4.5 x 3.5 cm, SUV 30.4 (image 87). The most intense portion of the mass is in the periphery, anteriorly. The mass involves the overlying skin, and is inseparable from the chest wall musculature. There is a separate, difficult to delineate FDG-avid mass in the central left breast (SUV 25.2; image 97).\par There are a few FDG-avid left axillary lymph nodes which measure up to 1.5 x 1.3 cm, SUV 18.1 (image 86).LUNGS: No abnormal FDG activity. No lung nodule.PLEURA/PERICARDIUM: No abnormal FDG activity. No effusion.HEPATOBILIARY/PANCREAS: Physiologic FDG activity. Liver SUV mean is 2.9.\par SPLEEN: Physiologic FDG activity. Normal in size.\par ADRENAL GLANDS: No abnormal FDG activity. No nodule.KIDNEYS/URINARY BLADDER: Physiologic excreted FDG activity.REPRODUCTIVE ORGANS: No abnormal FDG activity.\par ABDOMINOPELVIC NODES: No enlarged or FDG-avid lymph node.BOWEL/PERITONEUM/MESENTERY: No abnormal FDG activity. Large hiatal hernia with distended esophagus without associated hypermetabolism.\par BONES/SOFT TISSUES: Several FDG-avid mixed lytic and sclerotic lesions in the cervical, thoracic, and lumbar spine, bilateral pelvis, sternum, several bilateral ribs, and proximal right femur, including right intertrochanteric region and right subtrochanteric region. For reference, a lesion in the posterior right iliac bone measures 3.6 x 1.8 cm, SUV 13.3 (image 188). The cortex of the long bones is intact on CT.\par \par IMPRESSION: Abnormal FDG-PET/CT scan.\par 1. FDG-avid left breast masses, medial aspect upper left breast, and central aspect left breast, are compatible with malignancy. The mass in the medial aspect of the upper left breast involves the overlying skin and is inseparable from the chest wall musculature. The mass in the central left breast is not well delineated on CT. Further evaluation may be performed with MRI.\par 2. FDG-avid left axillary lymph node metastases.\par 3. FDG-avid mixed lytic nd sclerotic bone metastases in axial skeleton and proximal right femur.\par 4. Large hiatal hernia with distended esophagus, without associated hypermetabolism. Please correlate clinically.Findings emailed to Dr. Amita Gonsalves.\par \par --- End of Report ---\par \par PING REYNA MD; Attending Nuclear Medicine\par This document has been electronically signed. Jul 12 2022 1:44PM

## 2023-02-07 NOTE — HISTORY OF PRESENT ILLNESS
[de-identified] : The patient first noticed something in her left breast in .  She underwent an excisional biopsy and reports that it was a "benign cyst."\par \par The patient reports that beginning several years ago, she noticed "something" in the exact place where the prior cyst had been, and thought this was perhaps another cyst.  She chose to follow it.  Approximately 1-2 years ago, this caused skin breakdown and the patient reports she thought it was an abscess and was applying antibiotics on it.\par \par More recently, she presented to her psychiatry nurse practitioner and showed her the left breast lesion, was told this was breast cancer.  She consequently presented to a surgical physician at [facility].  That physician apparently performed a chest x-ray which was reportedly normal, an EKG which was normal, and a mammogram and sonogram (the results of which I do not have).  The patient was offered to have an excisional biopsy the following day, which she did on 2002 at [facility] in [place].  The outside pathology report shows invasive duct carcinoma, poorly differentiated, [place] score 8, measuring 4 cm, with angiolymphatic space invasion present, and carcinoma extending to the epidermis associated with ulceration, as well as to the peripheral and deep margins, estrogen receptor returned positive, progesterone receptor positive, and HER-2/samson negative (0), and Ki-67 was 8%.  The patient was last seen yesterday by her surgeon and informed him she wished to come to [place] for her medical care and reports that he did not speak afterwards but walked out of the room.  A nurse in his practice packed the wound site per patient report.\par \par The patient is here today in consultation regarding further treatment recommendations.  She notes a good appetite, stable weight, and excellent performance status.\par \par The patient reports that her  was the  on  at the [facility] site and subsequently developed pancreatic cancer, with the patient reporting he  a horrible death and his treatment team treated him very very poorly.  Subsequently her son also developed an unspecified illness and presented for medical care, and was sent out by the team reportedly thinking he was drug-seeking, and subsequently he suffered a severe medical illness; he then became an alcoholic and  of unspecified causes soon afterwards.  The patient reports that this has made her feel "like I am going crazy."  She reports that she has a generalized anxiety disorder for which she is followed by a nurse practitioner who writes prescriptions as well as a /therapist.  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  She reports that is why she has not seen a physician or sought medical care for over 10 years and has not had a mammogram for at least that many years.\par \par The patient had PET/CT with showed metastatic disease of the bones.  The patient was started on Anastrozole and palbociclib on 22.\par  [de-identified] : The patient presents for follow up.  \par \par Started anastrozole and palbociclib on 7/25/22.\par \par Today is day #1 of next cycle of palbo.\par \par She continues to deny any treatment related side effects.\par \par She states her wound continues to improve, now closed. \par \par Seen by Dr. Ramirez this AM, noted to have small purple discoloration at left breast lumpectomy site, which patient is worried about today. \par \par She denies any other complaints.  \par \par Notes a good appetite, stable weight and excellent performance status. \par \par PET/CT scan, 7/11/22- IMPRESSION: Abnormal FDG-PET/CT scan.\par \par 1. FDG-avid left breast masses, medial aspect upper left breast, and central aspect left breast, are compatible with malignancy. The mass in the medial aspect of the upper left breast involves the overlying skin and is inseparable from the chest wall musculature. The mass in the central left breast is not well delineated on CT. Further evaluation may be performed with MRI.\par 2. FDG-avid left axillary lymph node metastases.\par 3. FDG-avid mixed lytic and sclerotic bone metastases in axial skeleton and proximal right femur.\par 4. Large hiatal hernia with distended esophagus, without associated hypermetabolism. Please correlate clinically.\par \par \par

## 2023-02-07 NOTE — PHYSICAL EXAM
[Normal Breath Sounds] : Normal breath sounds [Skin Ulcer] : ulcer [Oriented to Person] : oriented to person [Oriented to Place] : oriented to place [Oriented to Time] : oriented to time [Calm] : calm [Please See PDF for [employer] Analytics] : Please See PDF for [employer]. [JVD] : no jugular venous distention  [de-identified] : nad [de-identified] : brandon [de-identified] : left breast ulcer [FreeTextEntry1] : no wrist edema [FreeTextEntry2] : non palp axilla [de-identified] : nl [de-identified] : intact rom

## 2023-02-07 NOTE — PLAN
[FreeTextEntry1] :  \par 2/7/2023\par wound is closed\par Plan; dry and closed, moisturizer/ skin prep\par avoid trauma, may change while on chemo\par f/u in 4 weeks\par \par 01/10/2023\par Plan:continue betadine foam local wound care\par followup 1 month\par \par dry dressing for protection\par RTO in 1 month\par \par

## 2023-02-07 NOTE — ASSESSMENT
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 venous lake or excess granular base,?suture material, not glue, adhesive remover applied; no tatoos\par no blue nevus on skin\par not ulcerated- will monitor- if it breaks dowm consider biopsy- will remeasure next month\par \par s/pvashe and betadine and foam\par   met stage 4 breast cancer/ 2010 tahbsa ovaries\par on anastrazole, ibrance, awaiting radiation eval\par \par  datacomplexity- mod  lab, xr, old rec, test resultsreview,, visualize imagecptreview previous\par risk -mod surgery\par prognosis- stage 4 \par assess-advance directive- lost  911died in 2015\par  and son in war, very sad, not suicidal, on meds\par   activity- nl\par  rec: 1-pain- controlled, 2-smell- none 3-drainage- none, 4-bleeding-no\par  5- size- palpable mass smaller, 6- itch- none\par education discussed wound care\par discussed nutrition 170 lb- trying to get to bmi 25\par   \par staying at friend home in [place]  , lives in [place] will travel and return for reassessment\par \par  \par \par 01/10/2023\par patient here for follow up on left breast wound, 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

## 2023-02-08 ENCOUNTER — TRANSCRIPTION ENCOUNTER (OUTPATIENT)
Age: 68
End: 2023-02-08

## 2023-02-08 LAB — CANCER AG27-29 SERPL-ACNC: 33.2 U/ML

## 2023-03-07 ENCOUNTER — APPOINTMENT (OUTPATIENT)
Dept: HEMATOLOGY ONCOLOGY | Facility: CLINIC | Age: 68
End: 2023-03-07
Payer: MEDICARE

## 2023-03-07 ENCOUNTER — RESULT REVIEW (OUTPATIENT)
Age: 68
End: 2023-03-07

## 2023-03-07 ENCOUNTER — APPOINTMENT (OUTPATIENT)
Dept: WOUND CARE | Facility: CLINIC | Age: 68
End: 2023-03-07
Payer: MEDICARE

## 2023-03-07 VITALS
DIASTOLIC BLOOD PRESSURE: 85 MMHG | HEART RATE: 78 BPM | SYSTOLIC BLOOD PRESSURE: 154 MMHG | HEIGHT: 63.98 IN | WEIGHT: 169.76 LBS | BODY MASS INDEX: 28.98 KG/M2 | TEMPERATURE: 98 F | RESPIRATION RATE: 16 BRPM | OXYGEN SATURATION: 99 %

## 2023-03-07 DIAGNOSIS — C50.919 MALIGNANT NEOPLASM OF UNSPECIFIED SITE OF UNSPECIFIED FEMALE BREAST: ICD-10-CM

## 2023-03-07 LAB
BASOPHILS # BLD AUTO: 0.06 K/UL — SIGNIFICANT CHANGE UP (ref 0–0.2)
BASOPHILS NFR BLD AUTO: 1.4 % — SIGNIFICANT CHANGE UP (ref 0–2)
EOSINOPHIL # BLD AUTO: 0.02 K/UL — SIGNIFICANT CHANGE UP (ref 0–0.5)
EOSINOPHIL NFR BLD AUTO: 0.5 % — SIGNIFICANT CHANGE UP (ref 0–6)
HCT VFR BLD CALC: 37.5 % — SIGNIFICANT CHANGE UP (ref 34.5–45)
HGB BLD-MCNC: 12.6 G/DL — SIGNIFICANT CHANGE UP (ref 11.5–15.5)
IMM GRANULOCYTES NFR BLD AUTO: 0.2 % — SIGNIFICANT CHANGE UP (ref 0–0.9)
LYMPHOCYTES # BLD AUTO: 1.22 K/UL — SIGNIFICANT CHANGE UP (ref 1–3.3)
LYMPHOCYTES # BLD AUTO: 27.9 % — SIGNIFICANT CHANGE UP (ref 13–44)
MCHC RBC-ENTMCNC: 33.6 G/DL — SIGNIFICANT CHANGE UP (ref 32–36)
MCHC RBC-ENTMCNC: 34.4 PG — HIGH (ref 27–34)
MCV RBC AUTO: 102.5 FL — HIGH (ref 80–100)
MONOCYTES # BLD AUTO: 0.43 K/UL — SIGNIFICANT CHANGE UP (ref 0–0.9)
MONOCYTES NFR BLD AUTO: 9.8 % — SIGNIFICANT CHANGE UP (ref 2–14)
NEUTROPHILS # BLD AUTO: 2.64 K/UL — SIGNIFICANT CHANGE UP (ref 1.8–7.4)
NEUTROPHILS NFR BLD AUTO: 60.2 % — SIGNIFICANT CHANGE UP (ref 43–77)
NRBC # BLD: 0 /100 WBCS — SIGNIFICANT CHANGE UP (ref 0–0)
PLATELET # BLD AUTO: 181 K/UL — SIGNIFICANT CHANGE UP (ref 150–400)
RBC # BLD: 3.66 M/UL — LOW (ref 3.8–5.2)
RBC # FLD: 13.3 % — SIGNIFICANT CHANGE UP (ref 10.3–14.5)
WBC # BLD: 4.38 K/UL — SIGNIFICANT CHANGE UP (ref 3.8–10.5)
WBC # FLD AUTO: 4.38 K/UL — SIGNIFICANT CHANGE UP (ref 3.8–10.5)

## 2023-03-07 PROCEDURE — 99214 OFFICE O/P EST MOD 30 MIN: CPT

## 2023-03-07 PROCEDURE — 99213 OFFICE O/P EST LOW 20 MIN: CPT

## 2023-03-07 NOTE — HISTORY OF PRESENT ILLNESS
[de-identified] : The patient first noticed something in her left breast in .  She underwent an excisional biopsy and reports that it was a "benign cyst."\par \par The patient reports that beginning several years ago, she noticed "something" in the exact place where the prior cyst had been, and thought this was perhaps another cyst.  She chose to follow it.  Approximately 1-2 years ago, this caused skin breakdown and the patient reports she thought it was an abscess and was applying antibiotics on it.\par \par More recently, she presented to her psychiatry nurse practitioner and showed her the left breast lesion, was told this was breast cancer.  She consequently presented to a surgical physician at [facility].  That physician apparently performed a chest x-ray which was reportedly normal, an EKG which was normal, and a mammogram and sonogram (the results of which I do not have).  The patient was offered to have an excisional biopsy the following day, which she did on 2002 at [facility] in [place].  The outside pathology report shows invasive duct carcinoma, poorly differentiated, [place] score 8, measuring 4 cm, with angiolymphatic space invasion present, and carcinoma extending to the epidermis associated with ulceration, as well as to the peripheral and deep margins, estrogen receptor returned positive, progesterone receptor positive, and HER-2/samson negative (0), and Ki-67 was 8%.  The patient was last seen yesterday by her surgeon and informed him she wished to come to [place] for her medical care and reports that he did not speak afterwards but walked out of the room.  A nurse in his practice packed the wound site per patient report.\par \par The patient is here today in consultation regarding further treatment recommendations.  She notes a good appetite, stable weight, and excellent performance status.\par \par The patient reports that her  was the  on  at the [place] site and subsequently developed pancreatic cancer, with the patient reporting he  a horrible death and his treatment team treated him very very poorly.  Subsequently her son also developed an unspecified illness and presented for medical care, and was sent out by the team reportedly thinking he was drug-seeking, and subsequently he suffered a severe medical illness; he then became an alcoholic and  of unspecified causes soon afterwards.  The patient reports that this has made her feel "like I am going crazy."  She reports that she has a generalized anxiety disorder for which she is followed by a nurse practitioner who writes prescriptions as well as a /therapist.  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  She reports that is why she has not seen a physician or sought medical care for over 10 years and has not had a mammogram for at least that many years.\par \par The patient had PET/CT with showed metastatic disease of the bones.  The patient was started on Anastrozole and palbociclib on 22.\par  [de-identified] : The patient presents for follow up.  \par \par Started anastrozole and palbociclib on 7/25/22.\par \par Today is day #1 of next cycle of palbo.\par \par She continues to deny any treatment related side effects.\par \par She states her wound is closed. \par \par Seen by Dr. Ramirez last month who noted to have small purple discoloration at left breast lumpectomy site, which patient continues to worry about.  She has follow up apt with Dr. Ramirez today. \par \par She denies any other complaints.  \par \par Notes a good appetite, stable weight and excellent performance status. \par \par PET/CT scan, 7/11/22- IMPRESSION: Abnormal FDG-PET/CT scan.\par \par 1. FDG-avid left breast masses, medial aspect upper left breast, and central aspect left breast, are compatible with malignancy. The mass in the medial aspect of the upper left breast involves the overlying skin and is inseparable from the chest wall musculature. The mass in the central left breast is not well delineated on CT. Further evaluation may be performed with MRI.\par 2. FDG-avid left axillary lymph node metastases.\par 3. FDG-avid mixed lytic and sclerotic bone metastases in axial skeleton and proximal right femur.\par 4. Large hiatal hernia with distended esophagus, without associated hypermetabolism. Please correlate clinically.\par \par \par

## 2023-03-07 NOTE — ASSESSMENT
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 6/2022, er /pr+ ki67 favorable, her2- on Ibrance \par pet improved Ulcer completely epithelialized, no cellulitis-\par \par small 9ock area with cluster of bluish skin,  venous lake  no tatoos\par no blue nevus on skin\par not ulcerated- will monitor- if it breaks down consider biopsy \par \par s/pvashe and betadine and foam\par   met stage 4 breast cancer/ 2010 tahbsa ovaries\par on anastrazole, ibrance, awaiting radiation eval\par \par  datacomplexity- mod  lab, xr, old rec, test resultsreview,, visualize imagecptreview previous\par risk -mod surgery\par prognosis- stage 4 \par assess-advance directive- lost  911died in 2015\par  and son in war, very sad, not suicidal, on meds\par   activity- nl\par  rec: 1-pain- controlled, 2-smell- none 3-drainage- none, 4-bleeding-no\par  5- size- palpable mass smaller, 6- itch- none\par education discussed wound care\par discussed nutrition 170 lb- trying to get to bmi 25\par   \par staying at friend home in [place]  , [facility] in [place] will travel and return for reassessment\par \par  \par \par 01/10/2023\par patient here for follow up on left breast wound, 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

## 2023-03-07 NOTE — PLAN
[FreeTextEntry1] :  3/7/23 skin closed over tumor\par forehead  temp 100+ , though ear , tongue nl\par \par 2/7/2023\par wound is closed\par Plan; dry and closed, moisturizer/ skin prep\par avoid trauma, may change while on chemo\par f/u in 4 weeks\par \par 01/10/2023\par Plan:continue betadine foam local wound care\par followup 1 month\par \par dry dressing for protection\par RTO in 1 month\par \par

## 2023-03-07 NOTE — PHYSICAL EXAM
[Fully active, able to carry on all pre-disease performance without restriction] : Status 0 - Fully active, able to carry on all pre-disease performance without restriction [Normal] : affect appropriate [de-identified] : Left breast improved, wound site now closed with good granulation tissue, no dicrete palpable masses, no palpable axillary nodes.  Right breast with no discrete palpable mass, no palpable axillary nodes.

## 2023-03-07 NOTE — DATA REVIEWED
[FreeTextEntry1] : \par EXAM: [id number] - PETCT SKUL-THI ONC FDG INIT - ORDERED BY: AMITA GONSALVES\par PROCEDURE DATE: 07/11/2022\par INTERPRETATION: PROCEDURE: PET/CT SKULL BASE-MID THIGH IMAGING\par CLINICAL INFORMATION: 67-year-old female referred for evaluation of locally advanced left breast cancer, upper inner left breast (ER/NE positive) PET/CT is done as part of the initial treatment strategy evaluation.\par RADIOPHARMACEUTICAL: 12.93 mCi F-18, FDG, I.V.\par \par TECHNIQUE: Fasting blood sugar measured prior to injection of radiopharmaceutical was 82 mg/dl. Following intravenous injection of the radiopharmaceutical and an uptake period of approximately 70 minutes, FDG-PET/CT was obtained on a [employer] Discovery 710 from skull base to mid thigh. Oral contrast was given during the uptake period. CT was performed during shallow respiration. The CT protocol was optimized for PET attenuation correction and anatomic localization. The CT protocol was not designed to produce and cannot replace state-of-the-art diagnostic CT images with specific imaging protocols for different body parts and indications. Images were reviewed on a dedicated workstation using multiplanar reconstruction.\par \par The standardized uptake values (SUV) are normalized to patient body weight and indicate the highest activity concentration (SUVmax) in a given disease site. All image numbers refer to axial image number.\par \par COMPARISON: No prior PET/CT.\par OTHER STUDIES USED FOR CORRELATION: None available.\par \par FINDINGS:\par HEAD/NECK: Physiologic FDG activity in visualized brain, head, and neck.\par CHEST: There is a an FDG-avid mass in the medial aspect of the upper left breast which measures 4.5 x 3.5 cm, SUV 30.4 (image 87). The most intense portion of the mass is in the periphery, anteriorly. The mass involves the overlying skin, and is inseparable from the chest wall musculature. There is a separate, difficult to delineate FDG-avid mass in the central left breast (SUV 25.2; image 97).\par There are a few FDG-avid left axillary lymph nodes which measure up to 1.5 x 1.3 cm, SUV 18.1 (image 86).LUNGS: No abnormal FDG activity. No lung nodule.PLEURA/PERICARDIUM: No abnormal FDG activity. No effusion.HEPATOBILIARY/PANCREAS: Physiologic FDG activity. Liver SUV mean is 2.9.\par SPLEEN: Physiologic FDG activity. Normal in size.\par ADRENAL GLANDS: No abnormal FDG activity. No nodule.KIDNEYS/URINARY BLADDER: Physiologic excreted FDG activity.REPRODUCTIVE ORGANS: No abnormal FDG activity.\par ABDOMINOPELVIC NODES: No enlarged or FDG-avid lymph node.BOWEL/PERITONEUM/MESENTERY: No abnormal FDG activity. Large hiatal hernia with distended esophagus without associated hypermetabolism.\par BONES/SOFT TISSUES: Several FDG-avid mixed lytic and sclerotic lesions in the cervical, thoracic, and lumbar spine, bilateral pelvis, sternum, several bilateral ribs, and proximal right femur, including right intertrochanteric region and right subtrochanteric region. For reference, a lesion in the posterior right iliac bone measures 3.6 x 1.8 cm, SUV 13.3 (image 188). The cortex of the long bones is intact on CT.\par \par IMPRESSION: Abnormal FDG-PET/CT scan.\par 1. FDG-avid left breast masses, medial aspect upper left breast, and central aspect left breast, are compatible with malignancy. The mass in the medial aspect of the upper left breast involves the overlying skin and is inseparable from the chest wall musculature. The mass in the central left breast is not well delineated on CT. Further evaluation may be performed with MRI.\par 2. FDG-avid left axillary lymph node metastases.\par 3. FDG-avid mixed lytic nd sclerotic bone metastases in axial skeleton and proximal right femur.\par 4. Large hiatal hernia with distended esophagus, without associated hypermetabolism. Please correlate clinically.Findings emailed to Dr. Amita Gonsalves.\par \par --- End of Report ---\par \par PING REYNA MD; Attending Nuclear Medicine\par This document has been electronically signed. Jul 12 2022 1:44PM

## 2023-03-07 NOTE — HISTORY OF PRESENT ILLNESS
[FreeTextEntry1] : location  left breast mass with ulceration- now closed, no drainage\par severity no fever , on therapy\par now no drainage - closed\par \par \par timing/duration months, postponed treatment during covid\par quality some oozing, drainage no pain\par other on rx awaiting  radiation, has met to bones\par  PET/CT with showed metastatic disease of the bones. The patient was started on Anastrozole and palbociclib on 7/25/22.  Started anastrozole and palbociclib on 7/25/22.\par Due to start C2 D1 today.  \par \par   history of depression, which she is followed by a psychiatrist who manages her medications. \par  occasional bilateral hip pain when getting up after sitting long periods of time, stable. Pain resolves after moving.\par   1. FDG-avid left breast masses, medial aspect upper left breast, and central aspect left breast, are compatible with malignancy. The mass in the medial aspect of the upper left breast involves the overlying skin and is inseparable from the chest wall musculature. The mass in the central left breast is not well delineated on CT. Further evaluation may be performed with MRI.\par 2. FDG-avid left axillary lymph node metastases.\par 3. FDG-avid mixed lytic and sclerotic bone metastases in axial skeleton and proximal right femur.\par 4. Large hiatal hernia with distended esophagus, without associated hypermetabolism. Please correlate clinically.\par \par

## 2023-03-07 NOTE — PHYSICAL EXAM
[Normal Breath Sounds] : Normal breath sounds [Skin Ulcer] : ulcer [Oriented to Person] : oriented to person [Oriented to Place] : oriented to place [Oriented to Time] : oriented to time [Calm] : calm [Please See PDF for [employer] Analytics] : Please See PDF for [employer]. [JVD] : no jugular venous distention  [de-identified] : nad [de-identified] : brandon [de-identified] : left breast ulcer [FreeTextEntry1] : no wrist edema [FreeTextEntry2] : non palp axilla [de-identified] : nl [de-identified] : intact rom

## 2023-03-08 LAB
ALBUMIN SERPL ELPH-MCNC: 4.3 G/DL
ALP BLD-CCNC: 69 U/L
ALT SERPL-CCNC: 13 U/L
ANION GAP SERPL CALC-SCNC: 13 MMOL/L
AST SERPL-CCNC: 15 U/L
BILIRUB SERPL-MCNC: 0.2 MG/DL
BUN SERPL-MCNC: 20 MG/DL
CALCIUM SERPL-MCNC: 9.5 MG/DL
CANCER AG27-29 SERPL-ACNC: 42 U/ML
CEA SERPL-MCNC: 10.4 NG/ML
CHLORIDE SERPL-SCNC: 105 MMOL/L
CO2 SERPL-SCNC: 23 MMOL/L
CREAT SERPL-MCNC: 0.98 MG/DL
EGFR: 64 ML/MIN/1.73M2
GLUCOSE SERPL-MCNC: 99 MG/DL
POTASSIUM SERPL-SCNC: 4.3 MMOL/L
PROT SERPL-MCNC: 6 G/DL
SODIUM SERPL-SCNC: 141 MMOL/L

## 2023-04-01 ENCOUNTER — OUTPATIENT (OUTPATIENT)
Dept: OUTPATIENT SERVICES | Facility: HOSPITAL | Age: 68
LOS: 1 days | Discharge: HOME | End: 2023-04-01

## 2023-04-01 DIAGNOSIS — C50.919 MALIGNANT NEOPLASM OF UNSPECIFIED SITE OF UNSPECIFIED FEMALE BREAST: ICD-10-CM

## 2023-04-03 ENCOUNTER — NON-APPOINTMENT (OUTPATIENT)
Age: 68
End: 2023-04-03

## 2023-04-04 ENCOUNTER — APPOINTMENT (OUTPATIENT)
Dept: HEMATOLOGY ONCOLOGY | Facility: CLINIC | Age: 68
End: 2023-04-04
Payer: MEDICARE

## 2023-04-04 ENCOUNTER — RESULT REVIEW (OUTPATIENT)
Age: 68
End: 2023-04-04

## 2023-04-04 ENCOUNTER — APPOINTMENT (OUTPATIENT)
Dept: WOUND CARE | Facility: CLINIC | Age: 68
End: 2023-04-04

## 2023-04-04 VITALS
OXYGEN SATURATION: 99 % | BODY MASS INDEX: 28.78 KG/M2 | TEMPERATURE: 97 F | DIASTOLIC BLOOD PRESSURE: 79 MMHG | WEIGHT: 167.55 LBS | HEART RATE: 70 BPM | SYSTOLIC BLOOD PRESSURE: 110 MMHG | RESPIRATION RATE: 16 BRPM

## 2023-04-04 LAB
BASOPHILS # BLD AUTO: 0.05 K/UL — SIGNIFICANT CHANGE UP (ref 0–0.2)
BASOPHILS NFR BLD AUTO: 1.3 % — SIGNIFICANT CHANGE UP (ref 0–2)
EOSINOPHIL # BLD AUTO: 0.04 K/UL — SIGNIFICANT CHANGE UP (ref 0–0.5)
EOSINOPHIL NFR BLD AUTO: 1 % — SIGNIFICANT CHANGE UP (ref 0–6)
HCT VFR BLD CALC: 37 % — SIGNIFICANT CHANGE UP (ref 34.5–45)
HGB BLD-MCNC: 12.3 G/DL — SIGNIFICANT CHANGE UP (ref 11.5–15.5)
IMM GRANULOCYTES NFR BLD AUTO: 0 % — SIGNIFICANT CHANGE UP (ref 0–0.9)
LYMPHOCYTES # BLD AUTO: 1.37 K/UL — SIGNIFICANT CHANGE UP (ref 1–3.3)
LYMPHOCYTES # BLD AUTO: 34.3 % — SIGNIFICANT CHANGE UP (ref 13–44)
MCHC RBC-ENTMCNC: 33.2 G/DL — SIGNIFICANT CHANGE UP (ref 32–36)
MCHC RBC-ENTMCNC: 34.2 PG — HIGH (ref 27–34)
MCV RBC AUTO: 102.8 FL — HIGH (ref 80–100)
MONOCYTES # BLD AUTO: 0.56 K/UL — SIGNIFICANT CHANGE UP (ref 0–0.9)
MONOCYTES NFR BLD AUTO: 14 % — SIGNIFICANT CHANGE UP (ref 2–14)
NEUTROPHILS # BLD AUTO: 1.98 K/UL — SIGNIFICANT CHANGE UP (ref 1.8–7.4)
NEUTROPHILS NFR BLD AUTO: 49.4 % — SIGNIFICANT CHANGE UP (ref 43–77)
NRBC # BLD: 0 /100 WBCS — SIGNIFICANT CHANGE UP (ref 0–0)
PLATELET # BLD AUTO: 202 K/UL — SIGNIFICANT CHANGE UP (ref 150–400)
RBC # BLD: 3.6 M/UL — LOW (ref 3.8–5.2)
RBC # FLD: 13.9 % — SIGNIFICANT CHANGE UP (ref 10.3–14.5)
WBC # BLD: 4 K/UL — SIGNIFICANT CHANGE UP (ref 3.8–10.5)
WBC # FLD AUTO: 4 K/UL — SIGNIFICANT CHANGE UP (ref 3.8–10.5)

## 2023-04-04 PROCEDURE — 99214 OFFICE O/P EST MOD 30 MIN: CPT

## 2023-04-04 NOTE — HISTORY OF PRESENT ILLNESS
[de-identified] : The patient first noticed something in her left breast in .  She underwent an excisional biopsy and reports that it was a "benign cyst."\par \par The patient reports that beginning several years ago, she noticed "something" in the exact place where the prior cyst had been, and thought this was perhaps another cyst.  She chose to follow it.  Approximately 1-2 years ago, this caused skin breakdown and the patient reports she thought it was an abscess and was applying antibiotics on it.\par \par More recently, she presented to her psychiatry nurse practitioner and showed her the left breast lesion, was told this was breast cancer.  She consequently presented to a surgical physician at [facility].  That physician apparently performed a chest x-ray which was reportedly normal, an EKG which was normal, and a mammogram and sonogram (the results of which I do not have).  The patient was offered to have an excisional biopsy the following day, which she did on 2002 at [facility] in [place].  The outside pathology report shows invasive duct carcinoma, poorly differentiated, [place] score 8, measuring 4 cm, with angiolymphatic space invasion present, and carcinoma extending to the epidermis associated with ulceration, as well as to the peripheral and deep margins, estrogen receptor returned positive, progesterone receptor positive, and HER-2/samson negative (0), and Ki-67 was 8%.  The patient was last seen yesterday by her surgeon and informed him she wished to come to [place] for her medical care and reports that he did not speak afterwards but walked out of the room.  A nurse in his practice packed the wound site per patient report.\par \par The patient is here today in consultation regarding further treatment recommendations.  She notes a good appetite, stable weight, and excellent performance status.\par \par The patient reports that her  was the  on  at the [facility] site and subsequently developed pancreatic cancer, with the patient reporting he  a horrible death and his treatment team treated him very very poorly.  Subsequently her son also developed an unspecified illness and presented for medical care, and was sent out by the team reportedly thinking he was drug-seeking, and subsequently he suffered a severe medical illness; he then became an alcoholic and  of unspecified causes soon afterwards.  The patient reports that this has made her feel "like I am going crazy."  She reports that she has a generalized anxiety disorder for which she is followed by a nurse practitioner who writes prescriptions as well as a /therapist.  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  She reports that is why she has not seen a physician or sought medical care for over 10 years and has not had a mammogram for at least that many years.\par \par The patient had PET/CT with showed metastatic disease of the bones.  The patient was started on Anastrozole and palbociclib on 22.\par  [de-identified] : The patient presents for follow up.  \par \par Started anastrozole and palbociclib on 7/25/22.\par \par Today is day #1 of next cycle of palbo.\par \par "This has been a horrible month" - she has  been concerned re her increased markers for the first time drawn during the last visit. \par \par She continues to deny any treatment related side effects.\par \par She has an acneiform like facial rash \par \par She denies any other complaints.  \par \par Notes a good appetite, stable weight and excellent performance status. Participating in a [employer] program x 12 classes t oincrese her iveral conditioning - doing well with tthat. \par \par PET/CT scan, 7/11/22- IMPRESSION: Abnormal FDG-PET/CT scan.\par \par 1. FDG-avid left breast masses, medial aspect upper left breast, and central aspect left breast, are compatible with malignancy. The mass in the medial aspect of the upper left breast involves the overlying skin and is inseparable from the chest wall musculature. The mass in the central left breast is not well delineated on CT. Further evaluation may be performed with MRI.\par 2. FDG-avid left axillary lymph node metastases.\par 3. FDG-avid mixed lytic and sclerotic bone metastases in axial skeleton and proximal right femur.\par 4. Large hiatal hernia with distended esophagus, without associated hypermetabolism. Please correlate clinically.\par \par \par

## 2023-04-04 NOTE — PHYSICAL EXAM
[Fully active, able to carry on all pre-disease performance without restriction] : Status 0 - Fully active, able to carry on all pre-disease performance without restriction [Normal] : affect appropriate [de-identified] : Left breast improved, wound site now closed with good granulation tissue, area of blusih discoloration likely dilated blood vessel ,  no palpable axillary nodes.  Right breast with no discrete palpable mass, no palpable axillary nodes.

## 2023-04-05 LAB
ALBUMIN SERPL ELPH-MCNC: 4.2 G/DL
ALP BLD-CCNC: 73 U/L
ALT SERPL-CCNC: 14 U/L
ANION GAP SERPL CALC-SCNC: 13 MMOL/L
AST SERPL-CCNC: 18 U/L
BILIRUB SERPL-MCNC: <0.2 MG/DL
BUN SERPL-MCNC: 15 MG/DL
CALCIUM SERPL-MCNC: 9.6 MG/DL
CANCER AG27-29 SERPL-ACNC: 38.7 U/ML
CEA SERPL-MCNC: 10 NG/ML
CHLORIDE SERPL-SCNC: 106 MMOL/L
CO2 SERPL-SCNC: 24 MMOL/L
CREAT SERPL-MCNC: 0.77 MG/DL
EGFR: 85 ML/MIN/1.73M2
GLUCOSE SERPL-MCNC: 103 MG/DL
POTASSIUM SERPL-SCNC: 4.2 MMOL/L
PROT SERPL-MCNC: 6.3 G/DL
SODIUM SERPL-SCNC: 142 MMOL/L

## 2023-04-27 NOTE — REASON FOR VISIT
[Follow-Up Visit] : a follow-up [FreeTextEntry2] : metastatic breast cancer details… soft/normal active bowel sounds/tender

## 2023-05-02 ENCOUNTER — RESULT REVIEW (OUTPATIENT)
Age: 68
End: 2023-05-02

## 2023-05-02 ENCOUNTER — APPOINTMENT (OUTPATIENT)
Dept: HEMATOLOGY ONCOLOGY | Facility: CLINIC | Age: 68
End: 2023-05-02
Payer: MEDICARE

## 2023-05-02 VITALS
RESPIRATION RATE: 16 BRPM | WEIGHT: 168.87 LBS | SYSTOLIC BLOOD PRESSURE: 129 MMHG | DIASTOLIC BLOOD PRESSURE: 84 MMHG | BODY MASS INDEX: 28.83 KG/M2 | HEART RATE: 114 BPM | HEIGHT: 63.98 IN | OXYGEN SATURATION: 99 % | TEMPERATURE: 97.4 F

## 2023-05-02 LAB
ALBUMIN SERPL ELPH-MCNC: 4.3 G/DL
ALP BLD-CCNC: 78 U/L
ALT SERPL-CCNC: 12 U/L
ANION GAP SERPL CALC-SCNC: 12 MMOL/L
AST SERPL-CCNC: 15 U/L
BASOPHILS # BLD AUTO: 0.06 K/UL — SIGNIFICANT CHANGE UP (ref 0–0.2)
BASOPHILS NFR BLD AUTO: 1.4 % — SIGNIFICANT CHANGE UP (ref 0–2)
BILIRUB SERPL-MCNC: 0.2 MG/DL
BUN SERPL-MCNC: 14 MG/DL
CALCIUM SERPL-MCNC: 9.5 MG/DL
CEA SERPL-MCNC: 9.6 NG/ML
CHLORIDE SERPL-SCNC: 107 MMOL/L
CO2 SERPL-SCNC: 25 MMOL/L
CREAT SERPL-MCNC: 0.85 MG/DL
EGFR: 75 ML/MIN/1.73M2
EOSINOPHIL # BLD AUTO: 0.03 K/UL — SIGNIFICANT CHANGE UP (ref 0–0.5)
EOSINOPHIL NFR BLD AUTO: 0.7 % — SIGNIFICANT CHANGE UP (ref 0–6)
GLUCOSE SERPL-MCNC: 114 MG/DL
HCT VFR BLD CALC: 40.2 % — SIGNIFICANT CHANGE UP (ref 34.5–45)
HGB BLD-MCNC: 13.2 G/DL — SIGNIFICANT CHANGE UP (ref 11.5–15.5)
IMM GRANULOCYTES NFR BLD AUTO: 0.2 % — SIGNIFICANT CHANGE UP (ref 0–0.9)
LYMPHOCYTES # BLD AUTO: 1.4 K/UL — SIGNIFICANT CHANGE UP (ref 1–3.3)
LYMPHOCYTES # BLD AUTO: 33.6 % — SIGNIFICANT CHANGE UP (ref 13–44)
MCHC RBC-ENTMCNC: 32.8 G/DL — SIGNIFICANT CHANGE UP (ref 32–36)
MCHC RBC-ENTMCNC: 33.7 PG — SIGNIFICANT CHANGE UP (ref 27–34)
MCV RBC AUTO: 102.6 FL — HIGH (ref 80–100)
MONOCYTES # BLD AUTO: 0.44 K/UL — SIGNIFICANT CHANGE UP (ref 0–0.9)
MONOCYTES NFR BLD AUTO: 10.6 % — SIGNIFICANT CHANGE UP (ref 2–14)
NEUTROPHILS # BLD AUTO: 2.23 K/UL — SIGNIFICANT CHANGE UP (ref 1.8–7.4)
NEUTROPHILS NFR BLD AUTO: 53.5 % — SIGNIFICANT CHANGE UP (ref 43–77)
NRBC # BLD: 0 /100 WBCS — SIGNIFICANT CHANGE UP (ref 0–0)
PLATELET # BLD AUTO: 197 K/UL — SIGNIFICANT CHANGE UP (ref 150–400)
POTASSIUM SERPL-SCNC: 4.3 MMOL/L
PROT SERPL-MCNC: 6.4 G/DL
RBC # BLD: 3.92 M/UL — SIGNIFICANT CHANGE UP (ref 3.8–5.2)
RBC # FLD: 13.8 % — SIGNIFICANT CHANGE UP (ref 10.3–14.5)
SODIUM SERPL-SCNC: 144 MMOL/L
WBC # BLD: 4.17 K/UL — SIGNIFICANT CHANGE UP (ref 3.8–10.5)
WBC # FLD AUTO: 4.17 K/UL — SIGNIFICANT CHANGE UP (ref 3.8–10.5)

## 2023-05-02 PROCEDURE — 99214 OFFICE O/P EST MOD 30 MIN: CPT

## 2023-05-02 NOTE — HISTORY OF PRESENT ILLNESS
[de-identified] : The patient first noticed something in her left breast in .  She underwent an excisional biopsy and reports that it was a "benign cyst."\par \par The patient reports that beginning several years ago, she noticed "something" in the exact place where the prior cyst had been, and thought this was perhaps another cyst.  She chose to follow it.  Approximately 1-2 years ago, this caused skin breakdown and the patient reports she thought it was an abscess and was applying antibiotics on it.\par \par More recently, she presented to her psychiatry nurse practitioner and showed her the left breast lesion, was told this was breast cancer.  She consequently presented to a surgical physician at [facility].  That physician apparently performed a chest x-ray which was reportedly normal, an EKG which was normal, and a mammogram and sonogram (the results of which I do not have).  The patient was offered to have an excisional biopsy the following day, which she did on 2002 at [facility] in [place].  The outside pathology report shows invasive duct carcinoma, poorly differentiated, [place] score 8, measuring 4 cm, with angiolymphatic space invasion present, and carcinoma extending to the epidermis associated with ulceration, as well as to the peripheral and deep margins, estrogen receptor returned positive, progesterone receptor positive, and HER-2/samson negative (0), and Ki-67 was 8%.  The patient was last seen yesterday by her surgeon and informed him she wished to come to [place] for her medical care and reports that he did not speak afterwards but walked out of the room.  A nurse in his practice packed the wound site per patient report.\par \par The patient is here today in consultation regarding further treatment recommendations.  She notes a good appetite, stable weight, and excellent performance status.\par \par The patient reports that her  was the  on  at the [facility] site and subsequently developed pancreatic cancer, with the patient reporting he  a horrible death and his treatment team treated him very very poorly.  Subsequently her son also developed an unspecified illness and presented for medical care, and was sent out by the team reportedly thinking he was drug-seeking, and subsequently he suffered a severe medical illness; he then became an alcoholic and  of unspecified causes soon afterwards.  The patient reports that this has made her feel "like I am going crazy."  She reports that she has a generalized anxiety disorder for which she is followed by a nurse practitioner who writes prescriptions as well as a /therapist.  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  She reports that is why she has not seen a physician or sought medical care for over 10 years and has not had a mammogram for at least that many years.\par \par The patient had PET/CT with showed metastatic disease of the bones.  The patient was started on Anastrozole and palbociclib on 22.\par  [de-identified] : The patient presents for follow up.  \par \par Started anastrozole and palbociclib on 7/25/22.\par \par Today is day #1 of next cycle of palbo.\par \par She continues to deny any treatment related side effects.\par \par She states she is doing well.  \par \par She has an acneiform like facial rash \par \par She denies any other complaints.  \par \par She states she is working with  who is helping with her anxiety. \par \par Notes a good appetite, stable weight and excellent performance status.  She has been exercising 3 days a week with [employer]. \par \par PET/CT, 1/12/23- IMPRESSION: Marked improvement in FDG avid lesions since the prior PET/CT in 7/2022:\par 1. Improvement in the hypermetabolic left breast masses and left axillary/internal mammary nodes as detailed above.\par 2. More sclerosis and less intense activity in FDG avid bone lesions indicative of bone remodeling as evidence of healing.\par \par PET/CT scan, 7/11/22- IMPRESSION: Abnormal FDG-PET/CT scan.\par 1. FDG-avid left breast masses, medial aspect upper left breast, and central aspect left breast, are compatible with malignancy. The mass in the medial aspect of the upper left breast involves the overlying skin and is inseparable from the chest wall musculature. The mass in the central left breast is not well delineated on CT. Further evaluation may be performed with MRI.\par 2. FDG-avid left axillary lymph node metastases.\par 3. FDG-avid mixed lytic and sclerotic bone metastases in axial skeleton and proximal right femur.\par 4. Large hiatal hernia with distended esophagus, without associated hypermetabolism. Please correlate clinically.\par \par \par

## 2023-05-02 NOTE — PHYSICAL EXAM
[Fully active, able to carry on all pre-disease performance without restriction] : Status 0 - Fully active, able to carry on all pre-disease performance without restriction [Normal] : affect appropriate [de-identified] : Left breast improved, wound site now closed with good granulation tissue, area of blueish discoloration likely dilated blood vessel ,  no palpable axillary nodes.  Right breast with no discrete palpable mass, no palpable axillary nodes.

## 2023-05-04 LAB — CANCER AG27-29 SERPL-ACNC: 38 U/ML

## 2023-05-30 ENCOUNTER — APPOINTMENT (OUTPATIENT)
Dept: HEMATOLOGY ONCOLOGY | Facility: CLINIC | Age: 68
End: 2023-05-30
Payer: MEDICARE

## 2023-05-30 ENCOUNTER — RESULT REVIEW (OUTPATIENT)
Age: 68
End: 2023-05-30

## 2023-05-30 VITALS
SYSTOLIC BLOOD PRESSURE: 141 MMHG | OXYGEN SATURATION: 98 % | BODY MASS INDEX: 29.05 KG/M2 | RESPIRATION RATE: 16 BRPM | WEIGHT: 169.09 LBS | TEMPERATURE: 97 F | DIASTOLIC BLOOD PRESSURE: 79 MMHG | HEART RATE: 96 BPM

## 2023-05-30 DIAGNOSIS — F33.1 MAJOR DEPRESSIVE DISORDER, RECURRENT, MODERATE: ICD-10-CM

## 2023-05-30 LAB
ALBUMIN SERPL ELPH-MCNC: 4.5 G/DL
ALP BLD-CCNC: 76 U/L
ALT SERPL-CCNC: 14 U/L
ANION GAP SERPL CALC-SCNC: 15 MMOL/L
AST SERPL-CCNC: 17 U/L
BASOPHILS # BLD AUTO: 0.04 K/UL — SIGNIFICANT CHANGE UP (ref 0–0.2)
BASOPHILS NFR BLD AUTO: 0.8 % — SIGNIFICANT CHANGE UP (ref 0–2)
BILIRUB SERPL-MCNC: 0.2 MG/DL
BUN SERPL-MCNC: 17 MG/DL
CALCIUM SERPL-MCNC: 9.5 MG/DL
CEA SERPL-MCNC: 10.4 NG/ML
CHLORIDE SERPL-SCNC: 103 MMOL/L
CO2 SERPL-SCNC: 24 MMOL/L
CREAT SERPL-MCNC: 0.8 MG/DL
EGFR: 81 ML/MIN/1.73M2
EOSINOPHIL # BLD AUTO: 0.01 K/UL — SIGNIFICANT CHANGE UP (ref 0–0.5)
EOSINOPHIL NFR BLD AUTO: 0.2 % — SIGNIFICANT CHANGE UP (ref 0–6)
GLUCOSE SERPL-MCNC: 109 MG/DL
HCT VFR BLD CALC: 40.7 % — SIGNIFICANT CHANGE UP (ref 34.5–45)
HGB BLD-MCNC: 13.3 G/DL — SIGNIFICANT CHANGE UP (ref 11.5–15.5)
IMM GRANULOCYTES NFR BLD AUTO: 0.2 % — SIGNIFICANT CHANGE UP (ref 0–0.9)
LYMPHOCYTES # BLD AUTO: 1.61 K/UL — SIGNIFICANT CHANGE UP (ref 1–3.3)
LYMPHOCYTES # BLD AUTO: 33 % — SIGNIFICANT CHANGE UP (ref 13–44)
MCHC RBC-ENTMCNC: 32.7 G/DL — SIGNIFICANT CHANGE UP (ref 32–36)
MCHC RBC-ENTMCNC: 33.5 PG — SIGNIFICANT CHANGE UP (ref 27–34)
MCV RBC AUTO: 102.5 FL — HIGH (ref 80–100)
MONOCYTES # BLD AUTO: 0.47 K/UL — SIGNIFICANT CHANGE UP (ref 0–0.9)
MONOCYTES NFR BLD AUTO: 9.6 % — SIGNIFICANT CHANGE UP (ref 2–14)
NEUTROPHILS # BLD AUTO: 2.74 K/UL — SIGNIFICANT CHANGE UP (ref 1.8–7.4)
NEUTROPHILS NFR BLD AUTO: 56.2 % — SIGNIFICANT CHANGE UP (ref 43–77)
NRBC # BLD: 0 /100 WBCS — SIGNIFICANT CHANGE UP (ref 0–0)
PLATELET # BLD AUTO: 180 K/UL — SIGNIFICANT CHANGE UP (ref 150–400)
POTASSIUM SERPL-SCNC: 3.8 MMOL/L
PROT SERPL-MCNC: 6.4 G/DL
RBC # BLD: 3.97 M/UL — SIGNIFICANT CHANGE UP (ref 3.8–5.2)
RBC # FLD: 13.7 % — SIGNIFICANT CHANGE UP (ref 10.3–14.5)
SODIUM SERPL-SCNC: 142 MMOL/L
WBC # BLD: 4.88 K/UL — SIGNIFICANT CHANGE UP (ref 3.8–10.5)
WBC # FLD AUTO: 4.88 K/UL — SIGNIFICANT CHANGE UP (ref 3.8–10.5)

## 2023-05-30 PROCEDURE — 99215 OFFICE O/P EST HI 40 MIN: CPT

## 2023-05-31 LAB — CANCER AG27-29 SERPL-ACNC: 38.7 U/ML

## 2023-06-06 NOTE — PHYSICAL EXAM
[Fully active, able to carry on all pre-disease performance without restriction] : Status 0 - Fully active, able to carry on all pre-disease performance without restriction [Normal] : pharynx is unremarkable, moist mucus membrane, no oral lesions [de-identified] : Left breast improved, wound site now closed with good granulation tissue, area of blueish discoloration likely dilated blood vessel ,  no palpable axillary nodes.  Right breast with no discrete palpable mass, no palpable axillary nodes.

## 2023-06-06 NOTE — HISTORY OF PRESENT ILLNESS
[de-identified] : The patient first noticed something in her left breast in .  She underwent an excisional biopsy and reports that it was a "benign cyst."\par \par The patient reports that beginning several years ago, she noticed "something" in the exact place where the prior cyst had been, and thought this was perhaps another cyst.  She chose to follow it.  Approximately 1-2 years ago, this caused skin breakdown and the patient reports she thought it was an abscess and was applying antibiotics on it.\par \par More recently, she presented to her psychiatry nurse practitioner and showed her the left breast lesion, was told this was breast cancer.  She consequently presented to a surgical physician at [facility].  That physician apparently performed a chest x-ray which was reportedly normal, an EKG which was normal, and a mammogram and sonogram (the results of which I do not have).  The patient was offered to have an excisional biopsy the following day, which she did on 2002 at [facility] in [place].  The outside pathology report shows invasive duct carcinoma, poorly differentiated, [place] score 8, measuring 4 cm, with angiolymphatic space invasion present, and carcinoma extending to the epidermis associated with ulceration, as well as to the peripheral and deep margins, estrogen receptor returned positive, progesterone receptor positive, and HER-2/samson negative (0), and Ki-67 was 8%.  The patient was last seen yesterday by her surgeon and informed him she wished to come to [place] for her medical care and reports that he did not speak afterwards but walked out of the room.  A nurse in his practice packed the wound site per patient report.\par \par The patient is here today in consultation regarding further treatment recommendations.  She notes a good appetite, stable weight, and excellent performance status.\par \par The patient reports that her  was the  on  at the [place] site and subsequently developed pancreatic cancer, with the patient reporting he  a horrible death and his treatment team treated him very very poorly.  Subsequently her son also developed an unspecified illness and presented for medical care, and was sent out by the team reportedly thinking he was drug-seeking, and subsequently he suffered a severe medical illness; he then became an alcoholic and  of unspecified causes soon afterwards.  The patient reports that this has made her feel "like I am going crazy."  She reports that she has a generalized anxiety disorder for which she is followed by a nurse practitioner who writes prescriptions as well as a /therapist.  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  She reports that is why she has not seen a physician or sought medical care for over 10 years and has not had a mammogram for at least that many years.\par \par The patient had PET/CT with showed metastatic disease of the bones.  The patient was started on Anastrozole and palbociclib on 22.\par  [de-identified] : The patient presents for follow up.  \par \par Started anastrozole and palbociclib on 7/25/22.\par \par Today is day #1 of next cycle of palbo.\par \par She continues to deny any treatment related side effects.\par \par She states she is doing well.  Patient states that she still has significant grief over the death of both her  and her son (both happened more than 5 years ago). She has seen a therapist and a psychiatry NP in [place] that have been adjusting the patient's medications. She expressed passive suicidal ideation in clinic today. Although she is fearful that she may die from her cancer, she has not made a clear plan (passive thoughts of multiple methods) and expresses that she does not have an intent to commit suicide. She states that she wants to live for her other son and could not leave him alone in the world.\par \par She has a mild acneiform like facial rash \par \par She denies any other complaints.  \par \par She states she is working with  who is helping with her anxiety. \par \par Notes a good appetite, stable weight and excellent performance status.  She has been exercising 3 days a week with [employer]. \par \par PET/CT, 1/12/23- IMPRESSION: Marked improvement in FDG avid lesions since the prior PET/CT in 7/2022:\par 1. Improvement in the hypermetabolic left breast masses and left axillary/internal mammary nodes as detailed above.\par 2. More sclerosis and less intense activity in FDG avid bone lesions indicative of bone remodeling as evidence of healing.\par \par PET/CT scan, 7/11/22- IMPRESSION: Abnormal FDG-PET/CT scan.\par 1. FDG-avid left breast masses, medial aspect upper left breast, and central aspect left breast, are compatible with malignancy. The mass in the medial aspect of the upper left breast involves the overlying skin and is inseparable from the chest wall musculature. The mass in the central left breast is not well delineated on CT. Further evaluation may be performed with MRI.\par 2. FDG-avid left axillary lymph node metastases.\par 3. FDG-avid mixed lytic and sclerotic bone metastases in axial skeleton and proximal right femur.\par 4. Large hiatal hernia with distended esophagus, without associated hypermetabolism. Please correlate clinically.\par \par \par

## 2023-06-19 ENCOUNTER — OUTPATIENT (OUTPATIENT)
Dept: OUTPATIENT SERVICES | Facility: HOSPITAL | Age: 68
LOS: 1 days | Discharge: HOME | End: 2023-06-19

## 2023-06-19 DIAGNOSIS — C50.919 MALIGNANT NEOPLASM OF UNSPECIFIED SITE OF UNSPECIFIED FEMALE BREAST: ICD-10-CM

## 2023-06-27 ENCOUNTER — RESULT REVIEW (OUTPATIENT)
Age: 68
End: 2023-06-27

## 2023-06-27 ENCOUNTER — APPOINTMENT (OUTPATIENT)
Dept: HEMATOLOGY ONCOLOGY | Facility: CLINIC | Age: 68
End: 2023-06-27
Payer: MEDICARE

## 2023-06-27 VITALS
DIASTOLIC BLOOD PRESSURE: 77 MMHG | HEIGHT: 63.98 IN | WEIGHT: 167.55 LBS | RESPIRATION RATE: 16 BRPM | HEART RATE: 81 BPM | SYSTOLIC BLOOD PRESSURE: 120 MMHG | TEMPERATURE: 98 F | OXYGEN SATURATION: 98 % | BODY MASS INDEX: 28.6 KG/M2

## 2023-06-27 LAB
BASOPHILS # BLD AUTO: 0.04 K/UL — SIGNIFICANT CHANGE UP (ref 0–0.2)
BASOPHILS NFR BLD AUTO: 1.1 % — SIGNIFICANT CHANGE UP (ref 0–2)
EOSINOPHIL # BLD AUTO: 0.01 K/UL — SIGNIFICANT CHANGE UP (ref 0–0.5)
EOSINOPHIL NFR BLD AUTO: 0.3 % — SIGNIFICANT CHANGE UP (ref 0–6)
HCT VFR BLD CALC: 39.1 % — SIGNIFICANT CHANGE UP (ref 34.5–45)
HGB BLD-MCNC: 13 G/DL — SIGNIFICANT CHANGE UP (ref 11.5–15.5)
IMM GRANULOCYTES NFR BLD AUTO: 0.3 % — SIGNIFICANT CHANGE UP (ref 0–0.9)
LYMPHOCYTES # BLD AUTO: 1.44 K/UL — SIGNIFICANT CHANGE UP (ref 1–3.3)
LYMPHOCYTES # BLD AUTO: 40.3 % — SIGNIFICANT CHANGE UP (ref 13–44)
MCHC RBC-ENTMCNC: 33.2 G/DL — SIGNIFICANT CHANGE UP (ref 32–36)
MCHC RBC-ENTMCNC: 33.9 PG — SIGNIFICANT CHANGE UP (ref 27–34)
MCV RBC AUTO: 101.8 FL — HIGH (ref 80–100)
MONOCYTES # BLD AUTO: 0.31 K/UL — SIGNIFICANT CHANGE UP (ref 0–0.9)
MONOCYTES NFR BLD AUTO: 8.7 % — SIGNIFICANT CHANGE UP (ref 2–14)
NEUTROPHILS # BLD AUTO: 1.76 K/UL — LOW (ref 1.8–7.4)
NEUTROPHILS NFR BLD AUTO: 49.3 % — SIGNIFICANT CHANGE UP (ref 43–77)
NRBC # BLD: 0 /100 WBCS — SIGNIFICANT CHANGE UP (ref 0–0)
PLATELET # BLD AUTO: 160 K/UL — SIGNIFICANT CHANGE UP (ref 150–400)
RBC # BLD: 3.84 M/UL — SIGNIFICANT CHANGE UP (ref 3.8–5.2)
RBC # FLD: 13.8 % — SIGNIFICANT CHANGE UP (ref 10.3–14.5)
WBC # BLD: 3.57 K/UL — LOW (ref 3.8–10.5)
WBC # FLD AUTO: 3.57 K/UL — LOW (ref 3.8–10.5)

## 2023-06-27 PROCEDURE — 99214 OFFICE O/P EST MOD 30 MIN: CPT

## 2023-06-28 ENCOUNTER — APPOINTMENT (OUTPATIENT)
Dept: HEMATOLOGY ONCOLOGY | Facility: CLINIC | Age: 68
End: 2023-06-28

## 2023-06-28 NOTE — HISTORY OF PRESENT ILLNESS
[de-identified] : The patient first noticed something in her left breast in .  She underwent an excisional biopsy and reports that it was a "benign cyst."\par \par The patient reports that beginning several years ago, she noticed "something" in the exact place where the prior cyst had been, and thought this was perhaps another cyst.  She chose to follow it.  Approximately 1-2 years ago, this caused skin breakdown and the patient reports she thought it was an abscess and was applying antibiotics on it.\par \par More recently, she presented to her psychiatry nurse practitioner and showed her the left breast lesion, was told this was breast cancer.  She consequently presented to a surgical physician at [facility].  That physician apparently performed a chest x-ray which was reportedly normal, an EKG which was normal, and a mammogram and sonogram (the results of which I do not have).  The patient was offered to have an excisional biopsy the following day, which she did on 2002 at [facility] in [place].  The outside pathology report shows invasive duct carcinoma, poorly differentiated, [place] score 8, measuring 4 cm, with angiolymphatic space invasion present, and carcinoma extending to the epidermis associated with ulceration, as well as to the peripheral and deep margins, estrogen receptor returned positive, progesterone receptor positive, and HER-2/samson negative (0), and Ki-67 was 8%.  The patient was last seen yesterday by her surgeon and informed him she wished to come to [place] for her medical care and reports that he did not speak afterwards but walked out of the room.  A nurse in his practice packed the wound site per patient report.\par \par The patient is here today in consultation regarding further treatment recommendations.  She notes a good appetite, stable weight, and excellent performance status.\par \par The patient reports that her  was the  on  at the [employer] site and subsequently developed pancreatic cancer, with the patient reporting he  a horrible death and his treatment team treated him very very poorly.  Subsequently her son also developed an unspecified illness and presented for medical care, and was sent out by the team reportedly thinking he was drug-seeking, and subsequently he suffered a severe medical illness; he then became an alcoholic and  of unspecified causes soon afterwards.  The patient reports that this has made her feel "like I am going crazy."  She reports that she has a generalized anxiety disorder for which she is followed by a nurse practitioner who writes prescriptions as well as a /therapist.  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  She reports that is why she has not seen a physician or sought medical care for over 10 years and has not had a mammogram for at least that many years.\par \par The patient had PET/CT with showed metastatic disease of the bones.  The patient was started on Anastrozole and palbociclib on 22.\par  [de-identified] : The patient presents for follow up.  \par \par Started anastrozole and palbociclib on 7/25/22.\par \par Today is day #1 of next cycle of palbo.\par \par She continues to deny any treatment related side effects.\par \par Last visit c/o a mild acneiform like facial rash- in interim has been using an OTC acne med with resolution.  \par \par In interim see by psych NP and started on Zoloft - developed loss of appetite, generalized weakness / malaise. Stopped it and after 3 days felt better and then back to baseline. Spoke with psych NP who was unaware and admonished her to call and not stop the med on her own per pt. Nonetheless advise no further antidepressant at this time. \par \par Pt reports ongoing anxiety, depressed mood, occ crying but has joined the local [employer] and exercising more regularly. She got an [employer] watch gift from her son and is using it to monitor her steps - now up to 4k min / day and gradually increasing it over time. \par \par She denies any other complaints.  \par \par She states she is working with  who is helping with her anxiety. \par \par Notes a good appetite, stable weight and excellent performance status.   \par \par PET/CT, 1/12/23- IMPRESSION: Marked improvement in FDG avid lesions since the prior PET/CT in 7/2022:\par 1. Improvement in the hypermetabolic left breast masses and left axillary/internal mammary nodes as detailed above.\par 2. More sclerosis and less intense activity in FDG avid bone lesions indicative of bone remodeling as evidence of healing.\par \par PET/CT scan, 7/11/22- IMPRESSION: Abnormal FDG-PET/CT scan.\par 1. FDG-avid left breast masses, medial aspect upper left breast, and central aspect left breast, are compatible with malignancy. The mass in the medial aspect of the upper left breast involves the overlying skin and is inseparable from the chest wall musculature. The mass in the central left breast is not well delineated on CT. Further evaluation may be performed with MRI.\par 2. FDG-avid left axillary lymph node metastases.\par 3. FDG-avid mixed lytic and sclerotic bone metastases in axial skeleton and proximal right femur.\par 4. Large hiatal hernia with distended esophagus, without associated hypermetabolism. Please correlate clinically.\par \par \par

## 2023-06-28 NOTE — PHYSICAL EXAM
[Fully active, able to carry on all pre-disease performance without restriction] : Status 0 - Fully active, able to carry on all pre-disease performance without restriction [Normal] : affect appropriate [de-identified] : Left breast improved, wound site now closed with good granulation tissue, area of blueish discoloration likely dilated blood vessel and dense plaque with nodular center w/o change,  no palpable axillary nodes.  Right breast with no discrete palpable mass, no palpable axillary nodes.

## 2023-06-28 NOTE — REASON FOR VISIT
[Follow-Up Visit] : a follow-up [Pre-Treatment Visit] : a pre-treatment [FreeTextEntry2] : metastatic breast cancer

## 2023-06-28 NOTE — REVIEW OF SYSTEMS
[Anxiety] : anxiety [Depression] : depression [Negative] : Endocrine [FreeTextEntry2] : as above [de-identified] : as above [de-identified] : above

## 2023-06-29 LAB
ALBUMIN SERPL ELPH-MCNC: 4.4 G/DL
ALP BLD-CCNC: 73 U/L
ALT SERPL-CCNC: 24 U/L
ANION GAP SERPL CALC-SCNC: 14 MMOL/L
AST SERPL-CCNC: 22 U/L
BILIRUB SERPL-MCNC: 0.2 MG/DL
BUN SERPL-MCNC: 15 MG/DL
CALCIUM SERPL-MCNC: 9.3 MG/DL
CANCER AG27-29 SERPL-ACNC: 37.5 U/ML
CEA SERPL-MCNC: 9.9 NG/ML
CHLORIDE SERPL-SCNC: 106 MMOL/L
CO2 SERPL-SCNC: 24 MMOL/L
CREAT SERPL-MCNC: 0.74 MG/DL
EGFR: 89 ML/MIN/1.73M2
GLUCOSE SERPL-MCNC: 95 MG/DL
POTASSIUM SERPL-SCNC: 3.7 MMOL/L
PROT SERPL-MCNC: 6.3 G/DL
SODIUM SERPL-SCNC: 144 MMOL/L

## 2023-07-27 ENCOUNTER — RESULT REVIEW (OUTPATIENT)
Age: 68
End: 2023-07-27

## 2023-07-27 ENCOUNTER — APPOINTMENT (OUTPATIENT)
Dept: NUCLEAR MEDICINE | Facility: IMAGING CENTER | Age: 68
End: 2023-07-27
Payer: MEDICARE

## 2023-07-27 ENCOUNTER — APPOINTMENT (OUTPATIENT)
Dept: HEMATOLOGY ONCOLOGY | Facility: CLINIC | Age: 68
End: 2023-07-27
Payer: MEDICARE

## 2023-07-27 ENCOUNTER — OUTPATIENT (OUTPATIENT)
Dept: OUTPATIENT SERVICES | Facility: HOSPITAL | Age: 68
LOS: 1 days | End: 2023-07-27
Payer: MEDICARE

## 2023-07-27 VITALS
TEMPERATURE: 97.6 F | HEIGHT: 63.98 IN | SYSTOLIC BLOOD PRESSURE: 119 MMHG | RESPIRATION RATE: 16 BRPM | HEART RATE: 89 BPM | BODY MASS INDEX: 28.45 KG/M2 | DIASTOLIC BLOOD PRESSURE: 81 MMHG | WEIGHT: 166.67 LBS | OXYGEN SATURATION: 95 %

## 2023-07-27 DIAGNOSIS — C50.919 MALIGNANT NEOPLASM OF UNSPECIFIED SITE OF UNSPECIFIED FEMALE BREAST: ICD-10-CM

## 2023-07-27 LAB
ALBUMIN SERPL ELPH-MCNC: 4.5 G/DL
ALP BLD-CCNC: 77 U/L
ALT SERPL-CCNC: 16 U/L
ANION GAP SERPL CALC-SCNC: 12 MMOL/L
AST SERPL-CCNC: 18 U/L
BASOPHILS # BLD AUTO: 0.05 K/UL — SIGNIFICANT CHANGE UP (ref 0–0.2)
BASOPHILS NFR BLD AUTO: 1 % — SIGNIFICANT CHANGE UP (ref 0–2)
BILIRUB SERPL-MCNC: 0.4 MG/DL
BUN SERPL-MCNC: 18 MG/DL
CALCIUM SERPL-MCNC: 9.3 MG/DL
CEA SERPL-MCNC: 9 NG/ML
CHLORIDE SERPL-SCNC: 101 MMOL/L
CO2 SERPL-SCNC: 24 MMOL/L
CREAT SERPL-MCNC: 0.7 MG/DL
EGFR: 95 ML/MIN/1.73M2
EOSINOPHIL # BLD AUTO: 0.01 K/UL — SIGNIFICANT CHANGE UP (ref 0–0.5)
EOSINOPHIL NFR BLD AUTO: 0.2 % — SIGNIFICANT CHANGE UP (ref 0–6)
GLUCOSE SERPL-MCNC: 106 MG/DL
HCT VFR BLD CALC: 41.2 % — SIGNIFICANT CHANGE UP (ref 34.5–45)
HGB BLD-MCNC: 13.7 G/DL — SIGNIFICANT CHANGE UP (ref 11.5–15.5)
IMM GRANULOCYTES NFR BLD AUTO: 0.2 % — SIGNIFICANT CHANGE UP (ref 0–0.9)
LYMPHOCYTES # BLD AUTO: 1.28 K/UL — SIGNIFICANT CHANGE UP (ref 1–3.3)
LYMPHOCYTES # BLD AUTO: 26.5 % — SIGNIFICANT CHANGE UP (ref 13–44)
MCHC RBC-ENTMCNC: 33.3 G/DL — SIGNIFICANT CHANGE UP (ref 32–36)
MCHC RBC-ENTMCNC: 33.3 PG — SIGNIFICANT CHANGE UP (ref 27–34)
MCV RBC AUTO: 100.2 FL — HIGH (ref 80–100)
MONOCYTES # BLD AUTO: 0.47 K/UL — SIGNIFICANT CHANGE UP (ref 0–0.9)
MONOCYTES NFR BLD AUTO: 9.7 % — SIGNIFICANT CHANGE UP (ref 2–14)
NEUTROPHILS # BLD AUTO: 3.01 K/UL — SIGNIFICANT CHANGE UP (ref 1.8–7.4)
NEUTROPHILS NFR BLD AUTO: 62.4 % — SIGNIFICANT CHANGE UP (ref 43–77)
NRBC # BLD: 0 /100 WBCS — SIGNIFICANT CHANGE UP (ref 0–0)
PLATELET # BLD AUTO: 195 K/UL — SIGNIFICANT CHANGE UP (ref 150–400)
POTASSIUM SERPL-SCNC: 4.2 MMOL/L
PROT SERPL-MCNC: 6.6 G/DL
RBC # BLD: 4.11 M/UL — SIGNIFICANT CHANGE UP (ref 3.8–5.2)
RBC # FLD: 13.5 % — SIGNIFICANT CHANGE UP (ref 10.3–14.5)
SODIUM SERPL-SCNC: 137 MMOL/L
WBC # BLD: 4.83 K/UL — SIGNIFICANT CHANGE UP (ref 3.8–10.5)
WBC # FLD AUTO: 4.83 K/UL — SIGNIFICANT CHANGE UP (ref 3.8–10.5)

## 2023-07-27 PROCEDURE — A9552: CPT

## 2023-07-27 PROCEDURE — 78815 PET IMAGE W/CT SKULL-THIGH: CPT | Mod: 26,PS,MH

## 2023-07-27 PROCEDURE — 99214 OFFICE O/P EST MOD 30 MIN: CPT

## 2023-07-27 PROCEDURE — 78815 PET IMAGE W/CT SKULL-THIGH: CPT

## 2023-07-27 NOTE — PHYSICAL EXAM
[Fully active, able to carry on all pre-disease performance without restriction] : Status 0 - Fully active, able to carry on all pre-disease performance without restriction [Normal] : affect appropriate [de-identified] : Left breast improved, wound site now closed with good granulation tissue, area of blueish discoloration likely dilated blood vessel and dense plaque with nodular center w/o change,  no palpable axillary nodes.  Right breast with no discrete palpable mass, no palpable axillary nodes.

## 2023-07-27 NOTE — HISTORY OF PRESENT ILLNESS
[de-identified] : The patient first noticed something in her left breast in .  She underwent an excisional biopsy and reports that it was a "benign cyst."\par \par The patient reports that beginning several years ago, she noticed "something" in the exact place where the prior cyst had been, and thought this was perhaps another cyst.  She chose to follow it.  Approximately 1-2 years ago, this caused skin breakdown and the patient reports she thought it was an abscess and was applying antibiotics on it.\par \par More recently, she presented to her psychiatry nurse practitioner and showed her the left breast lesion, was told this was breast cancer.  She consequently presented to a surgical physician at [facility].  That physician apparently performed a chest x-ray which was reportedly normal, an EKG which was normal, and a mammogram and sonogram (the results of which I do not have).  The patient was offered to have an excisional biopsy the following day, which she did on 2002 at [facility] in [place].  The outside pathology report shows invasive duct carcinoma, poorly differentiated, [place] score 8, measuring 4 cm, with angiolymphatic space invasion present, and carcinoma extending to the epidermis associated with ulceration, as well as to the peripheral and deep margins, estrogen receptor returned positive, progesterone receptor positive, and HER-2/samson negative (0), and Ki-67 was 8%.  The patient was last seen yesterday by her surgeon and informed him she wished to come to [place] for her medical care and reports that he did not speak afterwards but walked out of the room.  A nurse in his practice packed the wound site per patient report.\par \par The patient is here today in consultation regarding further treatment recommendations.  She notes a good appetite, stable weight, and excellent performance status.\par \par The patient reports that her  was the  on  at the [place] site and subsequently developed pancreatic cancer, with the patient reporting he  a horrible death and his treatment team treated him very very poorly.  Subsequently her son also developed an unspecified illness and presented for medical care, and was sent out by the team reportedly thinking he was drug-seeking, and subsequently he suffered a severe medical illness; he then became an alcoholic and  of unspecified causes soon afterwards.  The patient reports that this has made her feel "like I am going crazy."  She reports that she has a generalized anxiety disorder for which she is followed by a nurse practitioner who writes prescriptions as well as a /therapist.  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  She reports that is why she has not seen a physician or sought medical care for over 10 years and has not had a mammogram for at least that many years.\par \par The patient had PET/CT with showed metastatic disease of the bones.  The patient was started on Anastrozole and palbociclib on 22.\par  [de-identified] : The patient presents for follow up.  \par \par Started anastrozole and palbociclib on 7/25/22.\par \par Today is day #1 of next cycle of palbo.\par \par She continues to deny any treatment related side effects.\par \par Pt reports ongoing anxiety, depressed mood, occ crying, worse the last week in anticipation of upcoming scans.  She googles and is on cancer groups that make her more nervous.  \par \par She states she has occasional diffuse point pain around abd that last about 1 second and go away.  Not in same area, varies.  Nothing brings it on, makes better or worse.  She can't pinpoint area of pain because only last 1 second and is not constant.  She denies any n/v, d/c, sob, cp. \par \par She received an an exercise tracker from her son and has been walking daily, keeping track of her steps, which has been increasing.  \par \par She denies any other complaints.  \par \par Notes a good appetite, stable weight and excellent performance status.   \par \par PET/CT, 1/12/23- IMPRESSION: Marked improvement in FDG avid lesions since the prior PET/CT in 7/2022:\par 1. Improvement in the hypermetabolic left breast masses and left axillary/internal mammary nodes as detailed above.\par 2. More sclerosis and less intense activity in FDG avid bone lesions indicative of bone remodeling as evidence of healing.\par \par PET/CT scan, 7/11/22- IMPRESSION: Abnormal FDG-PET/CT scan.\par 1. FDG-avid left breast masses, medial aspect upper left breast, and central aspect left breast, are compatible with malignancy. The mass in the medial aspect of the upper left breast involves the overlying skin and is inseparable from the chest wall musculature. The mass in the central left breast is not well delineated on CT. Further evaluation may be performed with MRI.\par 2. FDG-avid left axillary lymph node metastases.\par 3. FDG-avid mixed lytic and sclerotic bone metastases in axial skeleton and proximal right femur.\par 4. Large hiatal hernia with distended esophagus, without associated hypermetabolism. Please correlate clinically.\par \par \par

## 2023-07-27 NOTE — REVIEW OF SYSTEMS
[Anxiety] : anxiety [Depression] : depression [Negative] : Integumentary [FreeTextEntry2] : as above

## 2023-07-28 LAB — CANCER AG27-29 SERPL-ACNC: 39.2 U/ML

## 2023-08-16 ENCOUNTER — OUTPATIENT (OUTPATIENT)
Dept: OUTPATIENT SERVICES | Facility: HOSPITAL | Age: 68
LOS: 1 days | Discharge: HOME | End: 2023-08-16

## 2023-08-16 DIAGNOSIS — C50.919 MALIGNANT NEOPLASM OF UNSPECIFIED SITE OF UNSPECIFIED FEMALE BREAST: ICD-10-CM

## 2023-08-24 ENCOUNTER — RESULT REVIEW (OUTPATIENT)
Age: 68
End: 2023-08-24

## 2023-08-24 ENCOUNTER — APPOINTMENT (OUTPATIENT)
Dept: HEMATOLOGY ONCOLOGY | Facility: CLINIC | Age: 68
End: 2023-08-24
Payer: MEDICARE

## 2023-08-24 VITALS
HEIGHT: 63.98 IN | BODY MASS INDEX: 28.39 KG/M2 | SYSTOLIC BLOOD PRESSURE: 135 MMHG | OXYGEN SATURATION: 99 % | TEMPERATURE: 97.4 F | DIASTOLIC BLOOD PRESSURE: 94 MMHG | HEART RATE: 92 BPM | RESPIRATION RATE: 16 BRPM | WEIGHT: 166.29 LBS

## 2023-08-24 LAB
ALBUMIN SERPL ELPH-MCNC: 4.5 G/DL
ALP BLD-CCNC: 80 U/L
ALT SERPL-CCNC: 19 U/L
ANION GAP SERPL CALC-SCNC: 10 MMOL/L
AST SERPL-CCNC: 19 U/L
BASOPHILS # BLD AUTO: 0.04 K/UL — SIGNIFICANT CHANGE UP (ref 0–0.2)
BASOPHILS NFR BLD AUTO: 1.1 % — SIGNIFICANT CHANGE UP (ref 0–2)
BILIRUB SERPL-MCNC: 0.2 MG/DL
BUN SERPL-MCNC: 13 MG/DL
CALCIUM SERPL-MCNC: 9.1 MG/DL
CANCER AG27-29 SERPL-ACNC: 39.7 U/ML
CEA SERPL-MCNC: 9 NG/ML
CHLORIDE SERPL-SCNC: 106 MMOL/L
CO2 SERPL-SCNC: 24 MMOL/L
CREAT SERPL-MCNC: 0.7 MG/DL
EGFR: 95 ML/MIN/1.73M2
EOSINOPHIL # BLD AUTO: 0.02 K/UL — SIGNIFICANT CHANGE UP (ref 0–0.5)
EOSINOPHIL NFR BLD AUTO: 0.6 % — SIGNIFICANT CHANGE UP (ref 0–6)
GLUCOSE SERPL-MCNC: 93 MG/DL
HCT VFR BLD CALC: 39.2 % — SIGNIFICANT CHANGE UP (ref 34.5–45)
HGB BLD-MCNC: 13.3 G/DL — SIGNIFICANT CHANGE UP (ref 11.5–15.5)
IMM GRANULOCYTES NFR BLD AUTO: 0.6 % — SIGNIFICANT CHANGE UP (ref 0–0.9)
LYMPHOCYTES # BLD AUTO: 1.42 K/UL — SIGNIFICANT CHANGE UP (ref 1–3.3)
LYMPHOCYTES # BLD AUTO: 40.3 % — SIGNIFICANT CHANGE UP (ref 13–44)
MCHC RBC-ENTMCNC: 33.9 G/DL — SIGNIFICANT CHANGE UP (ref 32–36)
MCHC RBC-ENTMCNC: 34.2 PG — HIGH (ref 27–34)
MCV RBC AUTO: 100.8 FL — HIGH (ref 80–100)
MONOCYTES # BLD AUTO: 0.41 K/UL — SIGNIFICANT CHANGE UP (ref 0–0.9)
MONOCYTES NFR BLD AUTO: 11.6 % — SIGNIFICANT CHANGE UP (ref 2–14)
NEUTROPHILS # BLD AUTO: 1.61 K/UL — LOW (ref 1.8–7.4)
NEUTROPHILS NFR BLD AUTO: 45.8 % — SIGNIFICANT CHANGE UP (ref 43–77)
NRBC # BLD: 0 /100 WBCS — SIGNIFICANT CHANGE UP (ref 0–0)
PLATELET # BLD AUTO: 180 K/UL — SIGNIFICANT CHANGE UP (ref 150–400)
POTASSIUM SERPL-SCNC: 4.1 MMOL/L
PROT SERPL-MCNC: 6.4 G/DL
RBC # BLD: 3.89 M/UL — SIGNIFICANT CHANGE UP (ref 3.8–5.2)
RBC # FLD: 13.7 % — SIGNIFICANT CHANGE UP (ref 10.3–14.5)
SODIUM SERPL-SCNC: 141 MMOL/L
WBC # BLD: 3.52 K/UL — LOW (ref 3.8–10.5)
WBC # FLD AUTO: 3.52 K/UL — LOW (ref 3.8–10.5)

## 2023-08-24 PROCEDURE — 99214 OFFICE O/P EST MOD 30 MIN: CPT

## 2023-08-24 RX ORDER — BUPROPION HYDROCHLORIDE 150 MG/1
150 TABLET, EXTENDED RELEASE ORAL
Qty: 30 | Refills: 0 | Status: DISCONTINUED | COMMUNITY
Start: 2022-06-13 | End: 2023-08-24

## 2023-08-24 RX ORDER — ALPRAZOLAM 2 MG/1
2 TABLET ORAL
Qty: 90 | Refills: 0 | Status: ACTIVE | COMMUNITY
Start: 2023-08-15

## 2023-08-24 RX ORDER — CLONAZEPAM 2 MG/1
TABLET ORAL
Refills: 0 | Status: DISCONTINUED | COMMUNITY
End: 2023-08-24

## 2023-08-24 RX ORDER — BUPROPION HYDROCHLORIDE 300 MG/1
300 TABLET, EXTENDED RELEASE ORAL
Qty: 30 | Refills: 0 | Status: DISCONTINUED | COMMUNITY
Start: 2022-07-14 | End: 2023-08-24

## 2023-08-24 RX ORDER — BUPROPION HYDROCHLORIDE 100 MG/1
TABLET, FILM COATED ORAL
Refills: 0 | Status: DISCONTINUED | COMMUNITY
End: 2023-08-24

## 2023-08-24 RX ORDER — VENLAFAXINE 37.5 MG/1
37.5 TABLET, EXTENDED RELEASE ORAL DAILY
Qty: 60 | Refills: 0 | Status: DISCONTINUED | COMMUNITY
Start: 2022-11-17 | End: 2023-08-24

## 2023-08-24 NOTE — HISTORY OF PRESENT ILLNESS
[de-identified] : The patient first noticed something in her left breast in .  She underwent an excisional biopsy and reports that it was a "benign cyst."\par  \par  The patient reports that beginning several years ago, she noticed "something" in the exact place where the prior cyst had been, and thought this was perhaps another cyst.  She chose to follow it.  Approximately 1-2 years ago, this caused skin breakdown and the patient reports she thought it was an abscess and was applying antibiotics on it.\par  \par  More recently, she presented to her psychiatry nurse practitioner and showed her the left breast lesion, was told this was breast cancer.  She consequently presented to a surgical physician at [facility].  That physician apparently performed a chest x-ray which was reportedly normal, an EKG which was normal, and a mammogram and sonogram (the results of which I do not have).  The patient was offered to have an excisional biopsy the following day, which she did on 2002 at [facility] in [place].  The outside pathology report shows invasive duct carcinoma, poorly differentiated, [place] score 8, measuring 4 cm, with angiolymphatic space invasion present, and carcinoma extending to the epidermis associated with ulceration, as well as to the peripheral and deep margins, estrogen receptor returned positive, progesterone receptor positive, and HER-2/samson negative (0), and Ki-67 was 8%.  The patient was last seen yesterday by her surgeon and informed him she wished to come to [place] for her medical care and reports that he did not speak afterwards but walked out of the room.  A nurse in his practice packed the wound site per patient report.\par  \par  The patient is here today in consultation regarding further treatment recommendations.  She notes a good appetite, stable weight, and excellent performance status.\par  \par  The patient reports that her  was the  on  at the [employer] site and subsequently developed pancreatic cancer, with the patient reporting he  a horrible death and his treatment team treated him very very poorly.  Subsequently her son also developed an unspecified illness and presented for medical care, and was sent out by the team reportedly thinking he was drug-seeking, and subsequently he suffered a severe medical illness; he then became an alcoholic and  of unspecified causes soon afterwards.  The patient reports that this has made her feel "like I am going crazy."  She reports that she has a generalized anxiety disorder for which she is followed by a nurse practitioner who writes prescriptions as well as a /therapist.  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  She reports that is why she has not seen a physician or sought medical care for over 10 years and has not had a mammogram for at least that many years.\par  \par  The patient had PET/CT with showed metastatic disease of the bones.  The patient was started on Anastrozole and palbociclib on 22.\par   [de-identified] : Started anastrozole and palbociclib on 7/25/22.  The patient presents for follow up.    Today is day #1 of next cycle of palbo.  She continues to deny any treatment related side effects.  She states once she got the results of her recent PET/CT scan the occasional diffuse point pain around abd she was experiencing resolved and she hasn't experienced any pain since.    She received an exercise tracker from her son and has been walking daily, keeping track of her steps, which has been increasing.    She denies any other complaints.    Notes a good appetite, stable weight and excellent performance status.     PET/CT, 7/27/23- IMPRESSION: When compared with prior FDG-PET/CT dated 1/12/2023: 1. Continued decrease in metabolic activity within the larger left upper inner quadrant breast mass. Increased FDG avidity within the smaller inferolateral left breast mass which is unchanged in size. 2. Stable to decreased avidity of left axillary/intramammary lymph nodes. 3. Similar appearing sclerosis of the bony lesions, with mild or no FDG avidity which is decreased in metabolism, compatible with response to interval therapy. 4. No new lesion.  PET/CT, 1/12/23- IMPRESSION: Marked improvement in FDG avid lesions since the prior PET/CT in 7/2022: 1. Improvement in the hypermetabolic left breast masses and left axillary/internal mammary nodes as detailed above. 2. More sclerosis and less intense activity in FDG avid bone lesions indicative of bone remodeling as evidence of healing.  PET/CT scan, 7/11/22- IMPRESSION: Abnormal FDG-PET/CT scan. 1. FDG-avid left breast masses, medial aspect upper left breast, and central aspect left breast, are compatible with malignancy. The mass in the medial aspect of the upper left breast involves the overlying skin and is inseparable from the chest wall musculature. The mass in the central left breast is not well delineated on CT. Further evaluation may be performed with MRI. 2. FDG-avid left axillary lymph node metastases. 3. FDG-avid mixed lytic and sclerotic bone metastases in axial skeleton and proximal right femur. 4. Large hiatal hernia with distended esophagus, without associated hypermetabolism. Please correlate clinically.

## 2023-08-24 NOTE — PHYSICAL EXAM
[Fully active, able to carry on all pre-disease performance without restriction] : Status 0 - Fully active, able to carry on all pre-disease performance without restriction [Normal] : affect appropriate [de-identified] : Left breast improved, wound site now closed with good granulation tissue, no palpable axillary nodes.  Right breast with no discrete palpable mass, no palpable axillary nodes.

## 2023-09-19 ENCOUNTER — RESULT REVIEW (OUTPATIENT)
Age: 68
End: 2023-09-19

## 2023-09-19 ENCOUNTER — APPOINTMENT (OUTPATIENT)
Dept: HEMATOLOGY ONCOLOGY | Facility: CLINIC | Age: 68
End: 2023-09-19
Payer: MEDICARE

## 2023-09-19 VITALS
WEIGHT: 167.11 LBS | DIASTOLIC BLOOD PRESSURE: 89 MMHG | HEIGHT: 63.98 IN | TEMPERATURE: 98 F | BODY MASS INDEX: 28.53 KG/M2 | OXYGEN SATURATION: 98 % | SYSTOLIC BLOOD PRESSURE: 128 MMHG | HEART RATE: 101 BPM | RESPIRATION RATE: 16 BRPM

## 2023-09-19 LAB
BASOPHILS # BLD AUTO: 0.05 K/UL — SIGNIFICANT CHANGE UP (ref 0–0.2)
BASOPHILS NFR BLD AUTO: 1.5 % — SIGNIFICANT CHANGE UP (ref 0–2)
EOSINOPHIL # BLD AUTO: 0.01 K/UL — SIGNIFICANT CHANGE UP (ref 0–0.5)
EOSINOPHIL NFR BLD AUTO: 0.3 % — SIGNIFICANT CHANGE UP (ref 0–6)
HCT VFR BLD CALC: 38.2 % — SIGNIFICANT CHANGE UP (ref 34.5–45)
HGB BLD-MCNC: 13.1 G/DL — SIGNIFICANT CHANGE UP (ref 11.5–15.5)
IMM GRANULOCYTES NFR BLD AUTO: 0.3 % — SIGNIFICANT CHANGE UP (ref 0–0.9)
LYMPHOCYTES # BLD AUTO: 1.4 K/UL — SIGNIFICANT CHANGE UP (ref 1–3.3)
LYMPHOCYTES # BLD AUTO: 41.3 % — SIGNIFICANT CHANGE UP (ref 13–44)
MCHC RBC-ENTMCNC: 34.3 G/DL — SIGNIFICANT CHANGE UP (ref 32–36)
MCHC RBC-ENTMCNC: 34.6 PG — HIGH (ref 27–34)
MCV RBC AUTO: 100.8 FL — HIGH (ref 80–100)
MONOCYTES # BLD AUTO: 0.29 K/UL — SIGNIFICANT CHANGE UP (ref 0–0.9)
MONOCYTES NFR BLD AUTO: 8.6 % — SIGNIFICANT CHANGE UP (ref 2–14)
NEUTROPHILS # BLD AUTO: 1.63 K/UL — LOW (ref 1.8–7.4)
NEUTROPHILS NFR BLD AUTO: 48 % — SIGNIFICANT CHANGE UP (ref 43–77)
NRBC # BLD: 0 /100 WBCS — SIGNIFICANT CHANGE UP (ref 0–0)
PLATELET # BLD AUTO: 151 K/UL — SIGNIFICANT CHANGE UP (ref 150–400)
RBC # BLD: 3.79 M/UL — LOW (ref 3.8–5.2)
RBC # FLD: 13.9 % — SIGNIFICANT CHANGE UP (ref 10.3–14.5)
WBC # BLD: 3.39 K/UL — LOW (ref 3.8–10.5)
WBC # FLD AUTO: 3.39 K/UL — LOW (ref 3.8–10.5)

## 2023-09-19 PROCEDURE — 99214 OFFICE O/P EST MOD 30 MIN: CPT

## 2023-09-21 LAB
ALBUMIN SERPL ELPH-MCNC: 4.5 G/DL
ALP BLD-CCNC: 80 U/L
ALT SERPL-CCNC: 12 U/L
ANION GAP SERPL CALC-SCNC: 12 MMOL/L
AST SERPL-CCNC: 15 U/L
BILIRUB SERPL-MCNC: 0.6 MG/DL
BUN SERPL-MCNC: 11 MG/DL
CALCIUM SERPL-MCNC: 9.1 MG/DL
CANCER AG27-29 SERPL-ACNC: 34.5 U/ML
CEA SERPL-MCNC: 8.3 NG/ML
CHLORIDE SERPL-SCNC: 106 MMOL/L
CO2 SERPL-SCNC: 24 MMOL/L
CREAT SERPL-MCNC: 0.7 MG/DL
EGFR: 95 ML/MIN/1.73M2
GLUCOSE SERPL-MCNC: 100 MG/DL
POTASSIUM SERPL-SCNC: 4 MMOL/L
PROT SERPL-MCNC: 6.4 G/DL
SODIUM SERPL-SCNC: 142 MMOL/L

## 2023-10-06 ENCOUNTER — NON-APPOINTMENT (OUTPATIENT)
Age: 68
End: 2023-10-06

## 2023-10-16 ENCOUNTER — OUTPATIENT (OUTPATIENT)
Dept: OUTPATIENT SERVICES | Facility: HOSPITAL | Age: 68
LOS: 1 days | Discharge: HOME | End: 2023-10-16

## 2023-10-16 DIAGNOSIS — C50.919 MALIGNANT NEOPLASM OF UNSPECIFIED SITE OF UNSPECIFIED FEMALE BREAST: ICD-10-CM

## 2023-10-17 ENCOUNTER — RESULT REVIEW (OUTPATIENT)
Age: 68
End: 2023-10-17

## 2023-10-17 ENCOUNTER — APPOINTMENT (OUTPATIENT)
Dept: HEMATOLOGY ONCOLOGY | Facility: CLINIC | Age: 68
End: 2023-10-17
Payer: MEDICARE

## 2023-10-17 VITALS
WEIGHT: 165.77 LBS | TEMPERATURE: 96.9 F | SYSTOLIC BLOOD PRESSURE: 124 MMHG | DIASTOLIC BLOOD PRESSURE: 86 MMHG | OXYGEN SATURATION: 99 % | HEART RATE: 104 BPM | RESPIRATION RATE: 16 BRPM | BODY MASS INDEX: 28.48 KG/M2

## 2023-10-17 LAB
ANISOCYTOSIS BLD QL: SLIGHT — SIGNIFICANT CHANGE UP
ANISOCYTOSIS BLD QL: SLIGHT — SIGNIFICANT CHANGE UP
BASOPHILS # BLD AUTO: 0.03 K/UL — SIGNIFICANT CHANGE UP (ref 0–0.2)
BASOPHILS # BLD AUTO: 0.03 K/UL — SIGNIFICANT CHANGE UP (ref 0–0.2)
BASOPHILS NFR BLD AUTO: 1 % — SIGNIFICANT CHANGE UP (ref 0–2)
BASOPHILS NFR BLD AUTO: 1 % — SIGNIFICANT CHANGE UP (ref 0–2)
EOSINOPHIL # BLD AUTO: 0.01 K/UL — SIGNIFICANT CHANGE UP (ref 0–0.5)
EOSINOPHIL # BLD AUTO: 0.01 K/UL — SIGNIFICANT CHANGE UP (ref 0–0.5)
EOSINOPHIL NFR BLD AUTO: 0.3 % — SIGNIFICANT CHANGE UP (ref 0–6)
EOSINOPHIL NFR BLD AUTO: 0.3 % — SIGNIFICANT CHANGE UP (ref 0–6)
HCT VFR BLD CALC: 40.5 % — SIGNIFICANT CHANGE UP (ref 34.5–45)
HCT VFR BLD CALC: 40.5 % — SIGNIFICANT CHANGE UP (ref 34.5–45)
HGB BLD-MCNC: 13.6 G/DL — SIGNIFICANT CHANGE UP (ref 11.5–15.5)
HGB BLD-MCNC: 13.6 G/DL — SIGNIFICANT CHANGE UP (ref 11.5–15.5)
IMM GRANULOCYTES NFR BLD AUTO: 0 % — SIGNIFICANT CHANGE UP (ref 0–0.9)
IMM GRANULOCYTES NFR BLD AUTO: 0 % — SIGNIFICANT CHANGE UP (ref 0–0.9)
LYMPHOCYTES # BLD AUTO: 1.36 K/UL — SIGNIFICANT CHANGE UP (ref 1–3.3)
LYMPHOCYTES # BLD AUTO: 1.36 K/UL — SIGNIFICANT CHANGE UP (ref 1–3.3)
LYMPHOCYTES # BLD AUTO: 46.7 % — HIGH (ref 13–44)
LYMPHOCYTES # BLD AUTO: 46.7 % — HIGH (ref 13–44)
MACROCYTES BLD QL: SLIGHT — SIGNIFICANT CHANGE UP
MACROCYTES BLD QL: SLIGHT — SIGNIFICANT CHANGE UP
MCHC RBC-ENTMCNC: 33.6 G/DL — SIGNIFICANT CHANGE UP (ref 32–36)
MCHC RBC-ENTMCNC: 33.6 G/DL — SIGNIFICANT CHANGE UP (ref 32–36)
MCHC RBC-ENTMCNC: 34.4 PG — HIGH (ref 27–34)
MCHC RBC-ENTMCNC: 34.4 PG — HIGH (ref 27–34)
MCV RBC AUTO: 102.5 FL — HIGH (ref 80–100)
MCV RBC AUTO: 102.5 FL — HIGH (ref 80–100)
MONOCYTES # BLD AUTO: 0.26 K/UL — SIGNIFICANT CHANGE UP (ref 0–0.9)
MONOCYTES # BLD AUTO: 0.26 K/UL — SIGNIFICANT CHANGE UP (ref 0–0.9)
MONOCYTES NFR BLD AUTO: 8.9 % — SIGNIFICANT CHANGE UP (ref 2–14)
MONOCYTES NFR BLD AUTO: 8.9 % — SIGNIFICANT CHANGE UP (ref 2–14)
NEUTROPHILS # BLD AUTO: 1.25 K/UL — LOW (ref 1.8–7.4)
NEUTROPHILS # BLD AUTO: 1.25 K/UL — LOW (ref 1.8–7.4)
NEUTROPHILS NFR BLD AUTO: 43.1 % — SIGNIFICANT CHANGE UP (ref 43–77)
NEUTROPHILS NFR BLD AUTO: 43.1 % — SIGNIFICANT CHANGE UP (ref 43–77)
NRBC # BLD: 0 /100 WBCS — SIGNIFICANT CHANGE UP (ref 0–0)
NRBC # BLD: 0 /100 WBCS — SIGNIFICANT CHANGE UP (ref 0–0)
PLAT MORPH BLD: NORMAL — SIGNIFICANT CHANGE UP
PLAT MORPH BLD: NORMAL — SIGNIFICANT CHANGE UP
PLATELET # BLD AUTO: 147 K/UL — LOW (ref 150–400)
PLATELET # BLD AUTO: 147 K/UL — LOW (ref 150–400)
RBC # BLD: 3.95 M/UL — SIGNIFICANT CHANGE UP (ref 3.8–5.2)
RBC # BLD: 3.95 M/UL — SIGNIFICANT CHANGE UP (ref 3.8–5.2)
RBC # FLD: 14.2 % — SIGNIFICANT CHANGE UP (ref 10.3–14.5)
RBC # FLD: 14.2 % — SIGNIFICANT CHANGE UP (ref 10.3–14.5)
RBC BLD AUTO: ABNORMAL
RBC BLD AUTO: ABNORMAL
WBC # BLD: 2.91 K/UL — LOW (ref 3.8–10.5)
WBC # BLD: 2.91 K/UL — LOW (ref 3.8–10.5)
WBC # FLD AUTO: 2.91 K/UL — LOW (ref 3.8–10.5)
WBC # FLD AUTO: 2.91 K/UL — LOW (ref 3.8–10.5)

## 2023-10-17 PROCEDURE — 99214 OFFICE O/P EST MOD 30 MIN: CPT

## 2023-10-19 LAB
ALBUMIN SERPL ELPH-MCNC: 4.6 G/DL
ALP BLD-CCNC: 82 U/L
ALT SERPL-CCNC: 12 U/L
ANION GAP SERPL CALC-SCNC: 12 MMOL/L
AST SERPL-CCNC: 14 U/L
BILIRUB SERPL-MCNC: 0.3 MG/DL
BUN SERPL-MCNC: 10 MG/DL
CALCIUM SERPL-MCNC: 9.1 MG/DL
CANCER AG27-29 SERPL-ACNC: 41.5 U/ML
CEA SERPL-MCNC: 8.2 NG/ML
CHLORIDE SERPL-SCNC: 104 MMOL/L
CO2 SERPL-SCNC: 25 MMOL/L
CREAT SERPL-MCNC: 0.79 MG/DL
EGFR: 82 ML/MIN/1.73M2
GLUCOSE SERPL-MCNC: 99 MG/DL
POTASSIUM SERPL-SCNC: 4.2 MMOL/L
PROT SERPL-MCNC: 6.6 G/DL
SODIUM SERPL-SCNC: 142 MMOL/L

## 2023-11-14 ENCOUNTER — RESULT REVIEW (OUTPATIENT)
Age: 68
End: 2023-11-14

## 2023-11-14 ENCOUNTER — LABORATORY RESULT (OUTPATIENT)
Age: 68
End: 2023-11-14

## 2023-11-14 ENCOUNTER — APPOINTMENT (OUTPATIENT)
Dept: HEMATOLOGY ONCOLOGY | Facility: CLINIC | Age: 68
End: 2023-11-14
Payer: MEDICARE

## 2023-11-14 VITALS
HEART RATE: 114 BPM | TEMPERATURE: 97.2 F | DIASTOLIC BLOOD PRESSURE: 82 MMHG | SYSTOLIC BLOOD PRESSURE: 121 MMHG | BODY MASS INDEX: 28.44 KG/M2 | RESPIRATION RATE: 16 BRPM | WEIGHT: 165.57 LBS | OXYGEN SATURATION: 97 %

## 2023-11-14 LAB
BASOPHILS # BLD AUTO: 0.04 K/UL — SIGNIFICANT CHANGE UP (ref 0–0.2)
BASOPHILS # BLD AUTO: 0.04 K/UL — SIGNIFICANT CHANGE UP (ref 0–0.2)
BASOPHILS NFR BLD AUTO: 1.3 % — SIGNIFICANT CHANGE UP (ref 0–2)
BASOPHILS NFR BLD AUTO: 1.3 % — SIGNIFICANT CHANGE UP (ref 0–2)
EOSINOPHIL # BLD AUTO: 0.01 K/UL — SIGNIFICANT CHANGE UP (ref 0–0.5)
EOSINOPHIL # BLD AUTO: 0.01 K/UL — SIGNIFICANT CHANGE UP (ref 0–0.5)
EOSINOPHIL NFR BLD AUTO: 0.3 % — SIGNIFICANT CHANGE UP (ref 0–6)
EOSINOPHIL NFR BLD AUTO: 0.3 % — SIGNIFICANT CHANGE UP (ref 0–6)
HCT VFR BLD CALC: 39 % — SIGNIFICANT CHANGE UP (ref 34.5–45)
HCT VFR BLD CALC: 39 % — SIGNIFICANT CHANGE UP (ref 34.5–45)
HGB BLD-MCNC: 13.5 G/DL — SIGNIFICANT CHANGE UP (ref 11.5–15.5)
HGB BLD-MCNC: 13.5 G/DL — SIGNIFICANT CHANGE UP (ref 11.5–15.5)
IMM GRANULOCYTES NFR BLD AUTO: 0 % — SIGNIFICANT CHANGE UP (ref 0–0.9)
IMM GRANULOCYTES NFR BLD AUTO: 0 % — SIGNIFICANT CHANGE UP (ref 0–0.9)
LYMPHOCYTES # BLD AUTO: 1.17 K/UL — SIGNIFICANT CHANGE UP (ref 1–3.3)
LYMPHOCYTES # BLD AUTO: 1.17 K/UL — SIGNIFICANT CHANGE UP (ref 1–3.3)
LYMPHOCYTES # BLD AUTO: 37.7 % — SIGNIFICANT CHANGE UP (ref 13–44)
LYMPHOCYTES # BLD AUTO: 37.7 % — SIGNIFICANT CHANGE UP (ref 13–44)
MCHC RBC-ENTMCNC: 34.6 G/DL — SIGNIFICANT CHANGE UP (ref 32–36)
MCHC RBC-ENTMCNC: 34.6 G/DL — SIGNIFICANT CHANGE UP (ref 32–36)
MCHC RBC-ENTMCNC: 35.3 PG — HIGH (ref 27–34)
MCHC RBC-ENTMCNC: 35.3 PG — HIGH (ref 27–34)
MCV RBC AUTO: 102.1 FL — HIGH (ref 80–100)
MCV RBC AUTO: 102.1 FL — HIGH (ref 80–100)
MONOCYTES # BLD AUTO: 0.36 K/UL — SIGNIFICANT CHANGE UP (ref 0–0.9)
MONOCYTES # BLD AUTO: 0.36 K/UL — SIGNIFICANT CHANGE UP (ref 0–0.9)
MONOCYTES NFR BLD AUTO: 11.6 % — SIGNIFICANT CHANGE UP (ref 2–14)
MONOCYTES NFR BLD AUTO: 11.6 % — SIGNIFICANT CHANGE UP (ref 2–14)
NEUTROPHILS # BLD AUTO: 1.52 K/UL — LOW (ref 1.8–7.4)
NEUTROPHILS # BLD AUTO: 1.52 K/UL — LOW (ref 1.8–7.4)
NEUTROPHILS NFR BLD AUTO: 49.1 % — SIGNIFICANT CHANGE UP (ref 43–77)
NEUTROPHILS NFR BLD AUTO: 49.1 % — SIGNIFICANT CHANGE UP (ref 43–77)
NRBC # BLD: 0 /100 WBCS — SIGNIFICANT CHANGE UP (ref 0–0)
NRBC # BLD: 0 /100 WBCS — SIGNIFICANT CHANGE UP (ref 0–0)
PLATELET # BLD AUTO: 151 K/UL — SIGNIFICANT CHANGE UP (ref 150–400)
PLATELET # BLD AUTO: 151 K/UL — SIGNIFICANT CHANGE UP (ref 150–400)
RBC # BLD: 3.82 M/UL — SIGNIFICANT CHANGE UP (ref 3.8–5.2)
RBC # BLD: 3.82 M/UL — SIGNIFICANT CHANGE UP (ref 3.8–5.2)
RBC # FLD: 14.2 % — SIGNIFICANT CHANGE UP (ref 10.3–14.5)
RBC # FLD: 14.2 % — SIGNIFICANT CHANGE UP (ref 10.3–14.5)
WBC # BLD: 3.1 K/UL — LOW (ref 3.8–10.5)
WBC # BLD: 3.1 K/UL — LOW (ref 3.8–10.5)
WBC # FLD AUTO: 3.1 K/UL — LOW (ref 3.8–10.5)
WBC # FLD AUTO: 3.1 K/UL — LOW (ref 3.8–10.5)

## 2023-11-14 PROCEDURE — 99214 OFFICE O/P EST MOD 30 MIN: CPT

## 2023-11-15 LAB
ALBUMIN SERPL ELPH-MCNC: 4.5 G/DL
ALP BLD-CCNC: 78 U/L
ALT SERPL-CCNC: 12 U/L
ANION GAP SERPL CALC-SCNC: 12 MMOL/L
APPEARANCE: ABNORMAL
AST SERPL-CCNC: 15 U/L
BILIRUB SERPL-MCNC: 0.4 MG/DL
BILIRUBIN URINE: NEGATIVE
BLOOD URINE: NEGATIVE
BUN SERPL-MCNC: 14 MG/DL
CALCIUM SERPL-MCNC: 8.9 MG/DL
CANCER AG27-29 SERPL-ACNC: 40.4 U/ML
CEA SERPL-MCNC: 7 NG/ML
CHLORIDE SERPL-SCNC: 103 MMOL/L
CO2 SERPL-SCNC: 23 MMOL/L
COLOR: YELLOW
CREAT SERPL-MCNC: 0.76 MG/DL
EGFR: 86 ML/MIN/1.73M2
GLUCOSE QUALITATIVE U: NEGATIVE MG/DL
GLUCOSE SERPL-MCNC: 105 MG/DL
KETONES URINE: NEGATIVE MG/DL
LEUKOCYTE ESTERASE URINE: ABNORMAL
NITRITE URINE: NEGATIVE
PH URINE: 7
POTASSIUM SERPL-SCNC: 4 MMOL/L
PROT SERPL-MCNC: 6.4 G/DL
PROTEIN URINE: NEGATIVE MG/DL
SODIUM SERPL-SCNC: 137 MMOL/L
SPECIFIC GRAVITY URINE: <1.005
UROBILINOGEN URINE: 0.2 MG/DL

## 2023-11-22 ENCOUNTER — NON-APPOINTMENT (OUTPATIENT)
Age: 68
End: 2023-11-22

## 2023-12-12 ENCOUNTER — APPOINTMENT (OUTPATIENT)
Dept: HEMATOLOGY ONCOLOGY | Facility: CLINIC | Age: 68
End: 2023-12-12
Payer: MEDICARE

## 2023-12-12 ENCOUNTER — RESULT REVIEW (OUTPATIENT)
Age: 68
End: 2023-12-12

## 2023-12-12 ENCOUNTER — OUTPATIENT (OUTPATIENT)
Dept: OUTPATIENT SERVICES | Facility: HOSPITAL | Age: 68
LOS: 1 days | Discharge: STILL ADMITTED OR ADMINISTRATIVE | End: 2023-12-12

## 2023-12-12 VITALS
DIASTOLIC BLOOD PRESSURE: 88 MMHG | BODY MASS INDEX: 28.82 KG/M2 | TEMPERATURE: 97.9 F | OXYGEN SATURATION: 98 % | WEIGHT: 167.77 LBS | HEART RATE: 96 BPM | SYSTOLIC BLOOD PRESSURE: 142 MMHG | RESPIRATION RATE: 16 BRPM

## 2023-12-12 LAB
ALBUMIN SERPL ELPH-MCNC: 4.5 G/DL
ALP BLD-CCNC: 70 U/L
ALT SERPL-CCNC: 11 U/L
ANION GAP SERPL CALC-SCNC: 12 MMOL/L
AST SERPL-CCNC: 16 U/L
BASOPHILS # BLD AUTO: 0.04 K/UL — SIGNIFICANT CHANGE UP (ref 0–0.2)
BASOPHILS # BLD AUTO: 0.04 K/UL — SIGNIFICANT CHANGE UP (ref 0–0.2)
BASOPHILS NFR BLD AUTO: 1.3 % — SIGNIFICANT CHANGE UP (ref 0–2)
BASOPHILS NFR BLD AUTO: 1.3 % — SIGNIFICANT CHANGE UP (ref 0–2)
BILIRUB SERPL-MCNC: 0.3 MG/DL
BUN SERPL-MCNC: 10 MG/DL
CALCIUM SERPL-MCNC: 8.9 MG/DL
CEA SERPL-MCNC: 6.3 NG/ML
CHLORIDE SERPL-SCNC: 101 MMOL/L
CO2 SERPL-SCNC: 26 MMOL/L
CREAT SERPL-MCNC: 0.75 MG/DL
EGFR: 87 ML/MIN/1.73M2
EOSINOPHIL # BLD AUTO: 0 K/UL — SIGNIFICANT CHANGE UP (ref 0–0.5)
EOSINOPHIL # BLD AUTO: 0 K/UL — SIGNIFICANT CHANGE UP (ref 0–0.5)
EOSINOPHIL NFR BLD AUTO: 0 % — SIGNIFICANT CHANGE UP (ref 0–6)
EOSINOPHIL NFR BLD AUTO: 0 % — SIGNIFICANT CHANGE UP (ref 0–6)
GLUCOSE SERPL-MCNC: 103 MG/DL
HCT VFR BLD CALC: 38.1 % — SIGNIFICANT CHANGE UP (ref 34.5–45)
HCT VFR BLD CALC: 38.1 % — SIGNIFICANT CHANGE UP (ref 34.5–45)
HGB BLD-MCNC: 13 G/DL — SIGNIFICANT CHANGE UP (ref 11.5–15.5)
HGB BLD-MCNC: 13 G/DL — SIGNIFICANT CHANGE UP (ref 11.5–15.5)
IMM GRANULOCYTES NFR BLD AUTO: 0.3 % — SIGNIFICANT CHANGE UP (ref 0–0.9)
IMM GRANULOCYTES NFR BLD AUTO: 0.3 % — SIGNIFICANT CHANGE UP (ref 0–0.9)
LYMPHOCYTES # BLD AUTO: 1.32 K/UL — SIGNIFICANT CHANGE UP (ref 1–3.3)
LYMPHOCYTES # BLD AUTO: 1.32 K/UL — SIGNIFICANT CHANGE UP (ref 1–3.3)
LYMPHOCYTES # BLD AUTO: 43 % — SIGNIFICANT CHANGE UP (ref 13–44)
LYMPHOCYTES # BLD AUTO: 43 % — SIGNIFICANT CHANGE UP (ref 13–44)
MCHC RBC-ENTMCNC: 34.1 G/DL — SIGNIFICANT CHANGE UP (ref 32–36)
MCHC RBC-ENTMCNC: 34.1 G/DL — SIGNIFICANT CHANGE UP (ref 32–36)
MCHC RBC-ENTMCNC: 35.8 PG — HIGH (ref 27–34)
MCHC RBC-ENTMCNC: 35.8 PG — HIGH (ref 27–34)
MCV RBC AUTO: 105 FL — HIGH (ref 80–100)
MCV RBC AUTO: 105 FL — HIGH (ref 80–100)
MONOCYTES # BLD AUTO: 0.26 K/UL — SIGNIFICANT CHANGE UP (ref 0–0.9)
MONOCYTES # BLD AUTO: 0.26 K/UL — SIGNIFICANT CHANGE UP (ref 0–0.9)
MONOCYTES NFR BLD AUTO: 8.5 % — SIGNIFICANT CHANGE UP (ref 2–14)
MONOCYTES NFR BLD AUTO: 8.5 % — SIGNIFICANT CHANGE UP (ref 2–14)
NEUTROPHILS # BLD AUTO: 1.44 K/UL — LOW (ref 1.8–7.4)
NEUTROPHILS # BLD AUTO: 1.44 K/UL — LOW (ref 1.8–7.4)
NEUTROPHILS NFR BLD AUTO: 46.9 % — SIGNIFICANT CHANGE UP (ref 43–77)
NEUTROPHILS NFR BLD AUTO: 46.9 % — SIGNIFICANT CHANGE UP (ref 43–77)
NRBC # BLD: 0 /100 WBCS — SIGNIFICANT CHANGE UP (ref 0–0)
NRBC # BLD: 0 /100 WBCS — SIGNIFICANT CHANGE UP (ref 0–0)
PLATELET # BLD AUTO: 166 K/UL — SIGNIFICANT CHANGE UP (ref 150–400)
PLATELET # BLD AUTO: 166 K/UL — SIGNIFICANT CHANGE UP (ref 150–400)
POTASSIUM SERPL-SCNC: 4.1 MMOL/L
PROT SERPL-MCNC: 6.5 G/DL
RBC # BLD: 3.63 M/UL — LOW (ref 3.8–5.2)
RBC # BLD: 3.63 M/UL — LOW (ref 3.8–5.2)
RBC # FLD: 13.9 % — SIGNIFICANT CHANGE UP (ref 10.3–14.5)
RBC # FLD: 13.9 % — SIGNIFICANT CHANGE UP (ref 10.3–14.5)
SODIUM SERPL-SCNC: 138 MMOL/L
WBC # BLD: 3.07 K/UL — LOW (ref 3.8–10.5)
WBC # BLD: 3.07 K/UL — LOW (ref 3.8–10.5)
WBC # FLD AUTO: 3.07 K/UL — LOW (ref 3.8–10.5)
WBC # FLD AUTO: 3.07 K/UL — LOW (ref 3.8–10.5)

## 2023-12-12 PROCEDURE — 99214 OFFICE O/P EST MOD 30 MIN: CPT

## 2023-12-12 RX ORDER — CIPROFLOXACIN HYDROCHLORIDE 500 MG/1
500 TABLET, FILM COATED ORAL TWICE DAILY
Qty: 20 | Refills: 0 | Status: DISCONTINUED | COMMUNITY
Start: 2023-11-17 | End: 2023-12-12

## 2023-12-14 LAB — CANCER AG27-29 SERPL-ACNC: 44.6 U/ML

## 2023-12-15 ENCOUNTER — TRANSCRIPTION ENCOUNTER (OUTPATIENT)
Age: 68
End: 2023-12-15

## 2023-12-26 ENCOUNTER — OUTPATIENT (OUTPATIENT)
Dept: OUTPATIENT SERVICES | Facility: HOSPITAL | Age: 68
LOS: 1 days | Discharge: HOME | End: 2023-12-26

## 2023-12-26 DIAGNOSIS — C50.919 MALIGNANT NEOPLASM OF UNSPECIFIED SITE OF UNSPECIFIED FEMALE BREAST: ICD-10-CM

## 2024-01-09 ENCOUNTER — RESULT REVIEW (OUTPATIENT)
Age: 69
End: 2024-01-09

## 2024-01-09 ENCOUNTER — NON-APPOINTMENT (OUTPATIENT)
Age: 69
End: 2024-01-09

## 2024-01-09 ENCOUNTER — APPOINTMENT (OUTPATIENT)
Dept: HEMATOLOGY ONCOLOGY | Facility: CLINIC | Age: 69
End: 2024-01-09
Payer: MEDICARE

## 2024-01-09 ENCOUNTER — APPOINTMENT (OUTPATIENT)
Dept: NUCLEAR MEDICINE | Facility: IMAGING CENTER | Age: 69
End: 2024-01-09

## 2024-01-09 ENCOUNTER — APPOINTMENT (OUTPATIENT)
Dept: NUCLEAR MEDICINE | Facility: IMAGING CENTER | Age: 69
End: 2024-01-09
Payer: MEDICARE

## 2024-01-09 ENCOUNTER — OUTPATIENT (OUTPATIENT)
Dept: OUTPATIENT SERVICES | Facility: HOSPITAL | Age: 69
LOS: 1 days | End: 2024-01-09
Payer: MEDICARE

## 2024-01-09 VITALS
SYSTOLIC BLOOD PRESSURE: 132 MMHG | DIASTOLIC BLOOD PRESSURE: 86 MMHG | OXYGEN SATURATION: 99 % | TEMPERATURE: 98 F | HEIGHT: 63.98 IN | HEART RATE: 90 BPM | WEIGHT: 163.14 LBS | RESPIRATION RATE: 16 BRPM | BODY MASS INDEX: 27.85 KG/M2

## 2024-01-09 DIAGNOSIS — C50.919 MALIGNANT NEOPLASM OF UNSPECIFIED SITE OF UNSPECIFIED FEMALE BREAST: ICD-10-CM

## 2024-01-09 LAB
BASOPHILS # BLD AUTO: 0.05 K/UL — SIGNIFICANT CHANGE UP (ref 0–0.2)
BASOPHILS # BLD AUTO: 0.05 K/UL — SIGNIFICANT CHANGE UP (ref 0–0.2)
BASOPHILS NFR BLD AUTO: 1.6 % — SIGNIFICANT CHANGE UP (ref 0–2)
BASOPHILS NFR BLD AUTO: 1.6 % — SIGNIFICANT CHANGE UP (ref 0–2)
EOSINOPHIL # BLD AUTO: 0.01 K/UL — SIGNIFICANT CHANGE UP (ref 0–0.5)
EOSINOPHIL # BLD AUTO: 0.01 K/UL — SIGNIFICANT CHANGE UP (ref 0–0.5)
EOSINOPHIL NFR BLD AUTO: 0.3 % — SIGNIFICANT CHANGE UP (ref 0–6)
EOSINOPHIL NFR BLD AUTO: 0.3 % — SIGNIFICANT CHANGE UP (ref 0–6)
HCT VFR BLD CALC: 38.8 % — SIGNIFICANT CHANGE UP (ref 34.5–45)
HCT VFR BLD CALC: 38.8 % — SIGNIFICANT CHANGE UP (ref 34.5–45)
HGB BLD-MCNC: 13.4 G/DL — SIGNIFICANT CHANGE UP (ref 11.5–15.5)
HGB BLD-MCNC: 13.4 G/DL — SIGNIFICANT CHANGE UP (ref 11.5–15.5)
IMM GRANULOCYTES NFR BLD AUTO: 0.3 % — SIGNIFICANT CHANGE UP (ref 0–0.9)
IMM GRANULOCYTES NFR BLD AUTO: 0.3 % — SIGNIFICANT CHANGE UP (ref 0–0.9)
LYMPHOCYTES # BLD AUTO: 1.07 K/UL — SIGNIFICANT CHANGE UP (ref 1–3.3)
LYMPHOCYTES # BLD AUTO: 1.07 K/UL — SIGNIFICANT CHANGE UP (ref 1–3.3)
LYMPHOCYTES # BLD AUTO: 35.1 % — SIGNIFICANT CHANGE UP (ref 13–44)
LYMPHOCYTES # BLD AUTO: 35.1 % — SIGNIFICANT CHANGE UP (ref 13–44)
MCHC RBC-ENTMCNC: 34.5 G/DL — SIGNIFICANT CHANGE UP (ref 32–36)
MCHC RBC-ENTMCNC: 34.5 G/DL — SIGNIFICANT CHANGE UP (ref 32–36)
MCHC RBC-ENTMCNC: 36 PG — HIGH (ref 27–34)
MCHC RBC-ENTMCNC: 36 PG — HIGH (ref 27–34)
MCV RBC AUTO: 104.3 FL — HIGH (ref 80–100)
MCV RBC AUTO: 104.3 FL — HIGH (ref 80–100)
MONOCYTES # BLD AUTO: 0.28 K/UL — SIGNIFICANT CHANGE UP (ref 0–0.9)
MONOCYTES # BLD AUTO: 0.28 K/UL — SIGNIFICANT CHANGE UP (ref 0–0.9)
MONOCYTES NFR BLD AUTO: 9.2 % — SIGNIFICANT CHANGE UP (ref 2–14)
MONOCYTES NFR BLD AUTO: 9.2 % — SIGNIFICANT CHANGE UP (ref 2–14)
NEUTROPHILS # BLD AUTO: 1.63 K/UL — LOW (ref 1.8–7.4)
NEUTROPHILS # BLD AUTO: 1.63 K/UL — LOW (ref 1.8–7.4)
NEUTROPHILS NFR BLD AUTO: 53.5 % — SIGNIFICANT CHANGE UP (ref 43–77)
NEUTROPHILS NFR BLD AUTO: 53.5 % — SIGNIFICANT CHANGE UP (ref 43–77)
NRBC # BLD: 0 /100 WBCS — SIGNIFICANT CHANGE UP (ref 0–0)
NRBC # BLD: 0 /100 WBCS — SIGNIFICANT CHANGE UP (ref 0–0)
PLATELET # BLD AUTO: 163 K/UL — SIGNIFICANT CHANGE UP (ref 150–400)
PLATELET # BLD AUTO: 163 K/UL — SIGNIFICANT CHANGE UP (ref 150–400)
RBC # BLD: 3.72 M/UL — LOW (ref 3.8–5.2)
RBC # BLD: 3.72 M/UL — LOW (ref 3.8–5.2)
RBC # FLD: 13.2 % — SIGNIFICANT CHANGE UP (ref 10.3–14.5)
RBC # FLD: 13.2 % — SIGNIFICANT CHANGE UP (ref 10.3–14.5)
WBC # BLD: 3.05 K/UL — LOW (ref 3.8–10.5)
WBC # BLD: 3.05 K/UL — LOW (ref 3.8–10.5)
WBC # FLD AUTO: 3.05 K/UL — LOW (ref 3.8–10.5)
WBC # FLD AUTO: 3.05 K/UL — LOW (ref 3.8–10.5)

## 2024-01-09 PROCEDURE — 78815 PET IMAGE W/CT SKULL-THIGH: CPT | Mod: 26,PS,MH

## 2024-01-09 PROCEDURE — 99214 OFFICE O/P EST MOD 30 MIN: CPT

## 2024-01-09 PROCEDURE — A9552: CPT

## 2024-01-09 PROCEDURE — 78815 PET IMAGE W/CT SKULL-THIGH: CPT

## 2024-01-10 ENCOUNTER — APPOINTMENT (OUTPATIENT)
Dept: HEMATOLOGY ONCOLOGY | Facility: CLINIC | Age: 69
End: 2024-01-10

## 2024-01-10 ENCOUNTER — NON-APPOINTMENT (OUTPATIENT)
Age: 69
End: 2024-01-10

## 2024-01-10 LAB
ALBUMIN SERPL ELPH-MCNC: 4.4 G/DL
ALP BLD-CCNC: 83 U/L
ALT SERPL-CCNC: 10 U/L
ANION GAP SERPL CALC-SCNC: 14 MMOL/L
AST SERPL-CCNC: 14 U/L
BILIRUB SERPL-MCNC: 0.5 MG/DL
BUN SERPL-MCNC: 12 MG/DL
CALCIUM SERPL-MCNC: 9 MG/DL
CANCER AG27-29 SERPL-ACNC: 43.4 U/ML
CEA SERPL-MCNC: 6.8 NG/ML
CHLORIDE SERPL-SCNC: 102 MMOL/L
CO2 SERPL-SCNC: 23 MMOL/L
CREAT SERPL-MCNC: 0.67 MG/DL
EGFR: 96 ML/MIN/1.73M2
GLUCOSE SERPL-MCNC: 101 MG/DL
POTASSIUM SERPL-SCNC: 3.9 MMOL/L
PROT SERPL-MCNC: 6.2 G/DL
SODIUM SERPL-SCNC: 139 MMOL/L

## 2024-01-31 NOTE — PHYSICAL EXAM
[Fully active, able to carry on all pre-disease performance without restriction] : Status 0 - Fully active, able to carry on all pre-disease performance without restriction [Normal] : affect appropriate [de-identified] : Left breast wound site closed with good granulation tissue, with stable nodularity at the upper end w/o change and no other underlying palpable masses.  No plapable axillary nodes. Right breast with no discrete palpable mass, no palpable axillary nodes.

## 2024-01-31 NOTE — HISTORY OF PRESENT ILLNESS
[de-identified] : The patient first noticed something in her left breast in .  She underwent an excisional biopsy and reports that it was a "benign cyst."  The patient reports that beginning several years ago, she noticed "something" in the exact place where the prior cyst had been, and thought this was perhaps another cyst.  She chose to follow it.  Approximately 1-2 years ago, this caused skin breakdown and the patient reports she thought it was an abscess and was applying antibiotics on it.  More recently, she presented to her psychiatry nurse practitioner and showed her the left breast lesion, was told this was breast cancer.  She consequently presented to a surgical physician at [facility].  That physician apparently performed a chest x-ray which was reportedly normal, an EKG which was normal, and a mammogram and sonogram (the results of which I do not have).  The patient was offered to have an excisional biopsy the following day, which she did on 2022 at [facility] in [place].  The outside pathology report shows invasive duct carcinoma, poorly differentiated, Kalani score 8, measuring 4 cm, with angiolymphatic space invasion present, and carcinoma extending to the epidermis associated with ulceration, as well as to the peripheral and deep margins, estrogen receptor returned positive, progesterone receptor positive, and HER-2/samson negative (0), and Ki-67 was 8%.  The patient was last seen yesterday by her surgeon and informed him she wished to come to [place] for her medical care and reports that he did not speak afterwards but walked out of the room.  A nurse in his practice packed the wound site per patient report.  The patient is here today in consultation regarding further treatment recommendations.  She notes a good appetite, stable weight, and excellent performance status.  The patient reports that her  was the  on  at the [facility] site and subsequently developed pancreatic cancer, with the patient reporting he  a horrible death and his treatment team treated him very very poorly.  Subsequently her son also developed an unspecified illness and presented for medical care, and was sent out by the team reportedly thinking he was drug-seeking, and subsequently he suffered a severe medical illness; he then became an alcoholic and  of unspecified causes soon afterwards.  The patient reports that this has made her feel "like I am going crazy."  She reports that she has a generalized anxiety disorder for which she is followed by a nurse practitioner who writes prescriptions as well as a /therapist.  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  She reports that is why she has not seen a physician or sought medical care for over 10 years and has not had a mammogram for at least that many years.  The patient had PET/CT with showed metastatic disease of the bones.  The patient was started on Anastrozole and palbociclib on 22.  [de-identified] : Started anastrozole and palbociclib on 7/25/22.  The patient presents for follow up.    Today is day #1 of current cycle of palbo.  She continues to deny any treatment related side effects.  She states she has been experiencing lower back sensation when she lies down, she feels something is poking her, she changes position and resolves.  She denies any trauma to area.  Only occurs when lying in certain position.    She denies all other complaints.    She states she hasn't eaten in two days in preparation for the PET scan.  She didn't want to eat the wrong thing.   Notes a good appetite, stable weight and excellent performance status.     PET/CT, 7/27/23- IMPRESSION: When compared with prior FDG-PET/CT dated 1/12/2023: 1. Continued decrease in metabolic activity within the larger left upper inner quadrant breast mass. Increased FDG avidity within the smaller inferolateral left breast mass which is unchanged in size. 2. Stable to decreased avidity of left axillary/intramammary lymph nodes. 3. Similar appearing sclerosis of the bony lesions, with mild or no FDG avidity which is decreased in metabolism, compatible with response to interval therapy. 4. No new lesion.  PET/CT, 1/12/23- IMPRESSION: Marked improvement in FDG avid lesions since the prior PET/CT in 7/2022: 1. Improvement in the hypermetabolic left breast masses and left axillary/internal mammary nodes as detailed above. 2. More sclerosis and less intense activity in FDG avid bone lesions indicative of bone remodeling as evidence of healing.  PET/CT scan, 7/11/22- IMPRESSION: Abnormal FDG-PET/CT scan. 1. FDG-avid left breast masses, medial aspect upper left breast, and central aspect left breast, are compatible with malignancy. The mass in the medial aspect of the upper left breast involves the overlying skin and is inseparable from the chest wall musculature. The mass in the central left breast is not well delineated on CT. Further evaluation may be performed with MRI. 2. FDG-avid left axillary lymph node metastases. 3. FDG-avid mixed lytic and sclerotic bone metastases in axial skeleton and proximal right femur. 4. Large hiatal hernia with distended esophagus, without associated hypermetabolism. Please correlate clinically.

## 2024-02-06 ENCOUNTER — APPOINTMENT (OUTPATIENT)
Dept: HEMATOLOGY ONCOLOGY | Facility: CLINIC | Age: 69
End: 2024-02-06
Payer: MEDICARE

## 2024-02-06 ENCOUNTER — RESULT REVIEW (OUTPATIENT)
Age: 69
End: 2024-02-06

## 2024-02-06 VITALS
HEART RATE: 100 BPM | HEIGHT: 63.98 IN | BODY MASS INDEX: 28.79 KG/M2 | WEIGHT: 168.65 LBS | RESPIRATION RATE: 16 BRPM | SYSTOLIC BLOOD PRESSURE: 125 MMHG | OXYGEN SATURATION: 98 % | TEMPERATURE: 97.3 F | DIASTOLIC BLOOD PRESSURE: 85 MMHG

## 2024-02-06 DIAGNOSIS — C50.912 MALIGNANT NEOPLASM OF UNSPECIFIED SITE OF LEFT FEMALE BREAST: ICD-10-CM

## 2024-02-06 DIAGNOSIS — Z17.0 MALIGNANT NEOPLASM OF UNSPECIFIED SITE OF LEFT FEMALE BREAST: ICD-10-CM

## 2024-02-06 LAB
ALBUMIN SERPL ELPH-MCNC: 4.5 G/DL
ALP BLD-CCNC: 79 U/L
ALT SERPL-CCNC: 21 U/L
ANION GAP SERPL CALC-SCNC: 14 MMOL/L
AST SERPL-CCNC: 25 U/L
BASOPHILS # BLD AUTO: 0.04 K/UL — SIGNIFICANT CHANGE UP (ref 0–0.2)
BASOPHILS NFR BLD AUTO: 1.2 % — SIGNIFICANT CHANGE UP (ref 0–2)
BILIRUB SERPL-MCNC: 0.3 MG/DL
BUN SERPL-MCNC: 15 MG/DL
CALCIUM SERPL-MCNC: 9.1 MG/DL
CEA SERPL-MCNC: 7 NG/ML
CHLORIDE SERPL-SCNC: 101 MMOL/L
CO2 SERPL-SCNC: 23 MMOL/L
CREAT SERPL-MCNC: 0.73 MG/DL
EGFR: 90 ML/MIN/1.73M2
EOSINOPHIL # BLD AUTO: 0.01 K/UL — SIGNIFICANT CHANGE UP (ref 0–0.5)
EOSINOPHIL NFR BLD AUTO: 0.3 % — SIGNIFICANT CHANGE UP (ref 0–6)
GLUCOSE SERPL-MCNC: 104 MG/DL
HCT VFR BLD CALC: 38.7 % — SIGNIFICANT CHANGE UP (ref 34.5–45)
HGB BLD-MCNC: 13.4 G/DL — SIGNIFICANT CHANGE UP (ref 11.5–15.5)
IMM GRANULOCYTES NFR BLD AUTO: 0.3 % — SIGNIFICANT CHANGE UP (ref 0–0.9)
LYMPHOCYTES # BLD AUTO: 0.97 K/UL — LOW (ref 1–3.3)
LYMPHOCYTES # BLD AUTO: 29 % — SIGNIFICANT CHANGE UP (ref 13–44)
MCHC RBC-ENTMCNC: 34.6 G/DL — SIGNIFICANT CHANGE UP (ref 32–36)
MCHC RBC-ENTMCNC: 36.2 PG — HIGH (ref 27–34)
MCV RBC AUTO: 104.6 FL — HIGH (ref 80–100)
MONOCYTES # BLD AUTO: 0.38 K/UL — SIGNIFICANT CHANGE UP (ref 0–0.9)
MONOCYTES NFR BLD AUTO: 11.4 % — SIGNIFICANT CHANGE UP (ref 2–14)
NEUTROPHILS # BLD AUTO: 1.93 K/UL — SIGNIFICANT CHANGE UP (ref 1.8–7.4)
NEUTROPHILS NFR BLD AUTO: 57.8 % — SIGNIFICANT CHANGE UP (ref 43–77)
NRBC # BLD: 0 /100 WBCS — SIGNIFICANT CHANGE UP (ref 0–0)
PLATELET # BLD AUTO: 154 K/UL — SIGNIFICANT CHANGE UP (ref 150–400)
POTASSIUM SERPL-SCNC: 4.3 MMOL/L
PROT SERPL-MCNC: 6.4 G/DL
RBC # BLD: 3.7 M/UL — LOW (ref 3.8–5.2)
RBC # FLD: 13.3 % — SIGNIFICANT CHANGE UP (ref 10.3–14.5)
SODIUM SERPL-SCNC: 138 MMOL/L
WBC # BLD: 3.34 K/UL — LOW (ref 3.8–10.5)
WBC # FLD AUTO: 3.34 K/UL — LOW (ref 3.8–10.5)

## 2024-02-06 PROCEDURE — 99215 OFFICE O/P EST HI 40 MIN: CPT

## 2024-02-06 NOTE — HISTORY OF PRESENT ILLNESS
[100: Normal, no complaints, no evidence of disease.] : 100: Normal, no complaints, no evidence of disease. [de-identified] : The patient first noticed something in her left breast in .  She underwent an excisional biopsy and reports that it was a "benign cyst."  The patient reports that beginning several years ago, she noticed "something" in the exact place where the prior cyst had been, and thought this was perhaps another cyst.  She chose to follow it.  Approximately 1-2 years ago, this caused skin breakdown and the patient reports she thought it was an abscess and was applying antibiotics on it.  In he presented to her psychiatry nurse practitioner and showed her the left breast lesion, was told this was breast cancer.  She consequently presented to a surgical physician at [facility].  That physician apparently performed a chest x-ray which was reportedly normal, an EKG which was normal, and a mammogram and sonogram (the results of which I do not have).  The patient was offered to have an excisional biopsy the following day, which she did on 2022 at [facility] in [place].  The outside pathology report shows invasive duct carcinoma, poorly differentiated, [place] score 8, measuring 4 cm, with angiolymphatic space invasion present, and carcinoma extending to the epidermis associated with ulceration, as well as to the peripheral and deep margins, estrogen receptor returned positive, progesterone receptor positive, and HER-2/samson negative (0), and Ki-67 was 8%.  She required wound care post-operatively due to very poor healing  She was seen at  [facility] ([facility]  by Dr. Silver Fitzgerald in consultation regarding further treatment recommendations.   The patient reports that her  was the  on  at the [facility] site and subsequently developed pancreatic cancer, with the patient reporting he  a horrible death and his treatment team treated him very very poorly.  Subsequently her son also developed an unspecified illness and presented for medical care, and was sent out by the team reportedly thinking he was drug-seeking, and subsequently he suffered a severe medical illness; he then became an alcoholic and  of unspecified causes soon afterwards.    She reports that she has a generalized anxiety disorder for which she is followed by a nurse practitioner who writes prescriptions as well as a /therapist.  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  She reports that is why she has not seen a physician or sought medical care for over 10 years and has not had a mammogram for at least that many years.  The patient had PET/CT in 2022 which showed metastatic disease of the bones.  The patient was started on Anastrozole and palbociclib on 22. She travels back and forth from [place] for routine follow-up visits. [de-identified] : 24 Transferring care from Dr. Fitzgerald to me today All of the patient's prior records including radiology, pathology and prior notes reviewed; Past Medical History, Past Surgical History, Family History and Social history reviewed and updated in the patient's chart. Patient returns today to rule out progression or recurrence of breast cancer and to assess treatment toxicity. Started anastrozole and palbociclib on 22 and remains with stable disease. Notes a good appetite, stable weight and excellent performance status.    She had [employer] testing done on 1/10/24 but company called her to let her know that the tubing was  and she needs to repeat it.  PET/CT 2024 1. When compared with PET/CT 2023, the larger left upper inner quadrant breast mass is stable in size and shows continued decrease in FDG avidity. There is mild increase in size and FDG avidity of a smaller soft tissue mass in the left breast, slowly increasing in FDG avidity compared to the prior two PET scans, concerning for progression at this site. 2. Similar size and mildly increased FDG avidity of a left axillary lymph node. 3. Stable appearance of bone lesions, with stable low-level or no FDG avidity.   PET/CT, 23- IMPRESSION: When compared with prior FDG-PET/CT dated 2023: 1. Continued decrease in metabolic activity within the larger left upper inner quadrant breast mass. Increased FDG avidity within the smaller inferolateral left breast mass which is unchanged in size. 2. Stable to decreased avidity of left axillary/intramammary lymph nodes. 3. Similar appearing sclerosis of the bony lesions, with mild or no FDG avidity which is decreased in metabolism, compatible with response to interval therapy. 4. No new lesion.  PET/CT, 23- IMPRESSION: Marked improvement in FDG avid lesions since the prior PET/CT in 2022: 1. Improvement in the hypermetabolic left breast masses and left axillary/internal mammary nodes as detailed above. 2. More sclerosis and less intense activity in FDG avid bone lesions indicative of bone remodeling as evidence of healing.  PET/CT scan, 22- IMPRESSION: Abnormal FDG-PET/CT scan. 1. FDG-avid left breast masses, medial aspect upper left breast, and central aspect left breast, are compatible with malignancy. The mass in the medial aspect of the upper left breast involves the overlying skin and is inseparable from the chest wall musculature. The mass in the central left breast is not well delineated on CT. Further evaluation may be performed with MRI. 2. FDG-avid left axillary lymph node metastases. 3. FDG-avid mixed lytic and sclerotic bone metastases in axial skeleton and proximal right femur. 4. Large hiatal hernia with distended esophagus, without associated hypermetabolism. Please correlate clinically.

## 2024-02-06 NOTE — PHYSICAL EXAM
[Fully active, able to carry on all pre-disease performance without restriction] : Status 0 - Fully active, able to carry on all pre-disease performance without restriction [Normal] : affect appropriate [de-identified] : Left breast wound site closed with good granulation tissue, with stable nodularity at the upper end w/o change; measures 5X5cm; no other underlying palpable masses.  No plapable axillary nodes. Right breast with no discrete palpable mass, no palpable axillary nodes.

## 2024-02-06 NOTE — ASSESSMENT
[FreeTextEntry1] : 67 yo woman with metastatic left breast cancer; ER+ 80-85%; RI+ 65-75%; HER2 low (IHC 1+), initial diagnosis in 7/2022 with evidence of bone mets.  - continued on Palbociclib/anastrozole with overall stable disease noted on scans with last PET 1/2024 showing mixed response with 2 lesions in the left breast and resolution of bone mets - reviewed labs from last visit including stable CEA and CA 27-29 - plan for repeat PET/CT in 7/2024 unless labs are become abnormal - [id number] again ordered; paperwork completed and patient to go to the lab for draw - Patient had the opportunity to have all their questions answered to their satisfaction - f/u 1 month

## 2024-02-06 NOTE — REVIEW OF SYSTEMS
[Anxiety] : anxiety [Depression] : depression [Negative] : Endocrine [de-identified] : left chest wall post surgical site

## 2024-02-07 LAB — CANCER AG27-29 SERPL-ACNC: 43 U/ML

## 2024-02-21 ENCOUNTER — OUTPATIENT (OUTPATIENT)
Dept: OUTPATIENT SERVICES | Facility: HOSPITAL | Age: 69
LOS: 1 days | Discharge: HOME | End: 2024-02-21

## 2024-02-21 DIAGNOSIS — C50.919 MALIGNANT NEOPLASM OF UNSPECIFIED SITE OF UNSPECIFIED FEMALE BREAST: ICD-10-CM

## 2024-02-23 ENCOUNTER — NON-APPOINTMENT (OUTPATIENT)
Age: 69
End: 2024-02-23

## 2024-03-01 RX ORDER — ANASTROZOLE TABLETS 1 MG/1
1 TABLET ORAL
Qty: 90 | Refills: 3 | Status: ACTIVE | COMMUNITY
Start: 2022-07-25 | End: 1900-01-01

## 2024-03-01 RX ORDER — PALBOCICLIB 125 MG/1
125 TABLET, FILM COATED ORAL DAILY
Qty: 21 | Refills: 6 | Status: ACTIVE | COMMUNITY
Start: 2022-07-25 | End: 1900-01-01

## 2024-03-05 ENCOUNTER — RESULT REVIEW (OUTPATIENT)
Age: 69
End: 2024-03-05

## 2024-03-05 ENCOUNTER — APPOINTMENT (OUTPATIENT)
Dept: HEMATOLOGY ONCOLOGY | Facility: CLINIC | Age: 69
End: 2024-03-05
Payer: MEDICARE

## 2024-03-05 VITALS
TEMPERATURE: 97.5 F | HEART RATE: 90 BPM | DIASTOLIC BLOOD PRESSURE: 81 MMHG | WEIGHT: 167.55 LBS | OXYGEN SATURATION: 97 % | RESPIRATION RATE: 16 BRPM | SYSTOLIC BLOOD PRESSURE: 133 MMHG | BODY MASS INDEX: 28.78 KG/M2

## 2024-03-05 LAB
BASOPHILS # BLD AUTO: 0.04 K/UL — SIGNIFICANT CHANGE UP (ref 0–0.2)
BASOPHILS NFR BLD AUTO: 1 % — SIGNIFICANT CHANGE UP (ref 0–2)
EOSINOPHIL # BLD AUTO: 0.01 K/UL — SIGNIFICANT CHANGE UP (ref 0–0.5)
EOSINOPHIL NFR BLD AUTO: 0.3 % — SIGNIFICANT CHANGE UP (ref 0–6)
HCT VFR BLD CALC: 39.2 % — SIGNIFICANT CHANGE UP (ref 34.5–45)
HGB BLD-MCNC: 13.2 G/DL — SIGNIFICANT CHANGE UP (ref 11.5–15.5)
IMM GRANULOCYTES NFR BLD AUTO: 0.3 % — SIGNIFICANT CHANGE UP (ref 0–0.9)
LYMPHOCYTES # BLD AUTO: 1.26 K/UL — SIGNIFICANT CHANGE UP (ref 1–3.3)
LYMPHOCYTES # BLD AUTO: 32.6 % — SIGNIFICANT CHANGE UP (ref 13–44)
MCHC RBC-ENTMCNC: 33.7 G/DL — SIGNIFICANT CHANGE UP (ref 32–36)
MCHC RBC-ENTMCNC: 35.3 PG — HIGH (ref 27–34)
MCV RBC AUTO: 104.8 FL — HIGH (ref 80–100)
MONOCYTES # BLD AUTO: 0.41 K/UL — SIGNIFICANT CHANGE UP (ref 0–0.9)
MONOCYTES NFR BLD AUTO: 10.6 % — SIGNIFICANT CHANGE UP (ref 2–14)
NEUTROPHILS # BLD AUTO: 2.13 K/UL — SIGNIFICANT CHANGE UP (ref 1.8–7.4)
NEUTROPHILS NFR BLD AUTO: 55.2 % — SIGNIFICANT CHANGE UP (ref 43–77)
NRBC # BLD: 0 /100 WBCS — SIGNIFICANT CHANGE UP (ref 0–0)
PLATELET # BLD AUTO: 161 K/UL — SIGNIFICANT CHANGE UP (ref 150–400)
RBC # BLD: 3.74 M/UL — LOW (ref 3.8–5.2)
RBC # FLD: 13.1 % — SIGNIFICANT CHANGE UP (ref 10.3–14.5)
WBC # BLD: 3.86 K/UL — SIGNIFICANT CHANGE UP (ref 3.8–10.5)
WBC # FLD AUTO: 3.86 K/UL — SIGNIFICANT CHANGE UP (ref 3.8–10.5)

## 2024-03-05 PROCEDURE — 99214 OFFICE O/P EST MOD 30 MIN: CPT

## 2024-03-05 PROCEDURE — G2211 COMPLEX E/M VISIT ADD ON: CPT

## 2024-03-05 NOTE — REVIEW OF SYSTEMS
[Anxiety] : anxiety [Depression] : depression [Negative] : Endocrine [de-identified] : left chest wall post surgical site

## 2024-03-05 NOTE — HISTORY OF PRESENT ILLNESS
[de-identified] : The patient first noticed something in her left breast in .  She underwent an excisional biopsy and reports that it was a "benign cyst."  The patient reports that beginning several years ago, she noticed "something" in the exact place where the prior cyst had been, and thought this was perhaps another cyst.  She chose to follow it.  Approximately 1-2 years ago, this caused skin breakdown and the patient reports she thought it was an abscess and was applying antibiotics on it.  In he presented to her psychiatry nurse practitioner and showed her the left breast lesion, was told this was breast cancer.  She consequently presented to a surgical physician at [facility].  That physician apparently performed a chest x-ray which was reportedly normal, an EKG which was normal, and a mammogram and sonogram (the results of which I do not have).  The patient was offered to have an excisional biopsy the following day, which she did on 2022 at [facility] in [place].  The outside pathology report shows invasive duct carcinoma, poorly differentiated, Isabella score 8, measuring 4 cm, with angiolymphatic space invasion present, and carcinoma extending to the epidermis associated with ulceration, as well as to the peripheral and deep margins, estrogen receptor returned positive, progesterone receptor positive, and HER-2/samson negative (0), and Ki-67 was 8%.  She required wound care post-operatively due to very poor healing  She was seen at  [facility] ([facility])  by Dr. Silver Fitzgerald in consultation regarding further treatment recommendations.   The patient reports that her  was the  on  at the [facility] site and subsequently developed pancreatic cancer, with the patient reporting he  a horrible death and his treatment team treated him very very poorly.  Subsequently her son also developed an unspecified illness and presented for medical care, and was sent out by the team reportedly thinking he was drug-seeking, and subsequently he suffered a severe medical illness; he then became an alcoholic and  of unspecified causes soon afterwards.    She reports that she has a generalized anxiety disorder for which she is followed by a nurse practitioner who writes prescriptions as well as a /therapist.  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  She reports that is why she has not seen a physician or sought medical care for over 10 years and has not had a mammogram for at least that many years.  The patient had PET/CT in 2022 which showed metastatic disease of the bones.  The patient was started on Anastrozole and palbociclib on 22. She travels back and forth from [place] for routine follow-up visits. [de-identified] : Patient returns today to rule out progression or recurrence of breast cancer and to assess treatment toxicity. Started anastrozole and palbociclib on 7/25/22 and remains with stable disease.  Denies any treatment related complaints.  Continues to express anxiety regarding her cancer.  She is followed by psych who d/c her antidepressants and is currently on xanax.    Notes a good appetite, stable weight and excellent performance status.  She remains active, walks 2-4 miles a day.   She had [employer] testing done on 2/6/24 that showed no actionable mutations.  The was discussed with the patient.    PET/CT 1/9/2024 1. When compared with PET/CT 7/27/2023, the larger left upper inner quadrant breast mass is stable in size and shows continued decrease in FDG avidity. There is mild increase in size and FDG avidity of a smaller soft tissue mass in the left breast, slowly increasing in FDG avidity compared to the prior two PET scans, concerning for progression at this site. 2. Similar size and mildly increased FDG avidity of a left axillary lymph node. 3. Stable appearance of bone lesions, with stable low-level or no FDG avidity.   PET/CT, 7/27/23- IMPRESSION: When compared with prior FDG-PET/CT dated 1/12/2023: 1. Continued decrease in metabolic activity within the larger left upper inner quadrant breast mass. Increased FDG avidity within the smaller inferolateral left breast mass which is unchanged in size. 2. Stable to decreased avidity of left axillary/intramammary lymph nodes. 3. Similar appearing sclerosis of the bony lesions, with mild or no FDG avidity which is decreased in metabolism, compatible with response to interval therapy. 4. No new lesion.  PET/CT, 1/12/23- IMPRESSION: Marked improvement in FDG avid lesions since the prior PET/CT in 7/2022: 1. Improvement in the hypermetabolic left breast masses and left axillary/internal mammary nodes as detailed above. 2. More sclerosis and less intense activity in FDG avid bone lesions indicative of bone remodeling as evidence of healing.  PET/CT scan, 7/11/22- IMPRESSION: Abnormal FDG-PET/CT scan. 1. FDG-avid left breast masses, medial aspect upper left breast, and central aspect left breast, are compatible with malignancy. The mass in the medial aspect of the upper left breast involves the overlying skin and is inseparable from the chest wall musculature. The mass in the central left breast is not well delineated on CT. Further evaluation may be performed with MRI. 2. FDG-avid left axillary lymph node metastases. 3. FDG-avid mixed lytic and sclerotic bone metastases in axial skeleton and proximal right femur. 4. Large hiatal hernia with distended esophagus, without associated hypermetabolism. Please correlate clinically.    [100: Normal, no complaints, no evidence of disease.] : 100: Normal, no complaints, no evidence of disease.

## 2024-03-05 NOTE — PHYSICAL EXAM
[Fully active, able to carry on all pre-disease performance without restriction] : Status 0 - Fully active, able to carry on all pre-disease performance without restriction [Normal] : normal appearance, no rash, nodules, vesicles, ulcers, erythema [de-identified] : Left breast wound site closed with good granulation tissue, with stable nodularity at the upper end w/o change; measures 5X5cm; no other underlying palpable masses.  No plapable axillary nodes. Right breast with no discrete palpable mass, no palpable axillary nodes.

## 2024-03-05 NOTE — ASSESSMENT
[FreeTextEntry1] : 69 yo woman with metastatic left breast cancer; ER+ 80-85%; [place]+ 65-75%; HER2 low (IHC 1+), initial diagnosis in 7/2022 with evidence of bone mets.  - continued on Palbociclib/anastrozole with overall stable disease noted on scans with last PET 1/2024 showing mixed response with 2 lesions in the left breast and resolution of bone mets. -CBC okay to resume next cycle of palbo.  - reviewed labs from last visit including CEA and CA 27-29, fluctuate.  - plan for repeat PET/CT in 7/2024 unless labs are become abnormal - Patient had the opportunity to have all their questions answered to their satisfaction - f/u 1 month

## 2024-03-06 LAB
ALBUMIN SERPL ELPH-MCNC: 4.3 G/DL
ALP BLD-CCNC: 81 U/L
ALT SERPL-CCNC: 17 U/L
ANION GAP SERPL CALC-SCNC: 13 MMOL/L
AST SERPL-CCNC: 17 U/L
BILIRUB SERPL-MCNC: 0.3 MG/DL
BUN SERPL-MCNC: 13 MG/DL
CALCIUM SERPL-MCNC: 9 MG/DL
CANCER AG27-29 SERPL-ACNC: 37 U/ML
CEA SERPL-MCNC: 6.5 NG/ML
CHLORIDE SERPL-SCNC: 101 MMOL/L
CO2 SERPL-SCNC: 23 MMOL/L
CREAT SERPL-MCNC: 0.79 MG/DL
EGFR: 82 ML/MIN/1.73M2
GLUCOSE SERPL-MCNC: 96 MG/DL
POTASSIUM SERPL-SCNC: 3.8 MMOL/L
PROT SERPL-MCNC: 6.5 G/DL
SODIUM SERPL-SCNC: 137 MMOL/L

## 2024-04-02 ENCOUNTER — RESULT REVIEW (OUTPATIENT)
Age: 69
End: 2024-04-02

## 2024-04-02 ENCOUNTER — APPOINTMENT (OUTPATIENT)
Dept: HEMATOLOGY ONCOLOGY | Facility: CLINIC | Age: 69
End: 2024-04-02
Payer: MEDICARE

## 2024-04-02 VITALS
DIASTOLIC BLOOD PRESSURE: 84 MMHG | SYSTOLIC BLOOD PRESSURE: 139 MMHG | RESPIRATION RATE: 16 BRPM | HEART RATE: 89 BPM | OXYGEN SATURATION: 99 % | TEMPERATURE: 97.1 F | BODY MASS INDEX: 28.4 KG/M2 | WEIGHT: 165.32 LBS

## 2024-04-02 LAB
ALBUMIN SERPL ELPH-MCNC: 4.2 G/DL
ALP BLD-CCNC: 80 U/L
ALT SERPL-CCNC: 12 U/L
ANION GAP SERPL CALC-SCNC: 14 MMOL/L
AST SERPL-CCNC: 13 U/L
BASOPHILS # BLD AUTO: 0.04 K/UL — SIGNIFICANT CHANGE UP (ref 0–0.2)
BASOPHILS NFR BLD AUTO: 1.2 % — SIGNIFICANT CHANGE UP (ref 0–2)
BILIRUB SERPL-MCNC: 0.4 MG/DL
BUN SERPL-MCNC: 14 MG/DL
CALCIUM SERPL-MCNC: 8.9 MG/DL
CANCER AG27-29 SERPL-ACNC: 34.3 U/ML
CEA SERPL-MCNC: 6.5 NG/ML
CHLORIDE SERPL-SCNC: 102 MMOL/L
CO2 SERPL-SCNC: 24 MMOL/L
CREAT SERPL-MCNC: 0.8 MG/DL
EGFR: 81 ML/MIN/1.73M2
EOSINOPHIL # BLD AUTO: 0.03 K/UL — SIGNIFICANT CHANGE UP (ref 0–0.5)
EOSINOPHIL NFR BLD AUTO: 0.9 % — SIGNIFICANT CHANGE UP (ref 0–6)
GLUCOSE SERPL-MCNC: 99 MG/DL
HCT VFR BLD CALC: 37.7 % — SIGNIFICANT CHANGE UP (ref 34.5–45)
HGB BLD-MCNC: 12.7 G/DL — SIGNIFICANT CHANGE UP (ref 11.5–15.5)
IMM GRANULOCYTES NFR BLD AUTO: 0.3 % — SIGNIFICANT CHANGE UP (ref 0–0.9)
LYMPHOCYTES # BLD AUTO: 1.36 K/UL — SIGNIFICANT CHANGE UP (ref 1–3.3)
LYMPHOCYTES # BLD AUTO: 41.6 % — SIGNIFICANT CHANGE UP (ref 13–44)
MCHC RBC-ENTMCNC: 33.7 G/DL — SIGNIFICANT CHANGE UP (ref 32–36)
MCHC RBC-ENTMCNC: 35.4 PG — HIGH (ref 27–34)
MCV RBC AUTO: 105 FL — HIGH (ref 80–100)
MONOCYTES # BLD AUTO: 0.37 K/UL — SIGNIFICANT CHANGE UP (ref 0–0.9)
MONOCYTES NFR BLD AUTO: 11.3 % — SIGNIFICANT CHANGE UP (ref 2–14)
NEUTROPHILS # BLD AUTO: 1.46 K/UL — LOW (ref 1.8–7.4)
NEUTROPHILS NFR BLD AUTO: 44.7 % — SIGNIFICANT CHANGE UP (ref 43–77)
NRBC # BLD: 0 /100 WBCS — SIGNIFICANT CHANGE UP (ref 0–0)
PLATELET # BLD AUTO: 171 K/UL — SIGNIFICANT CHANGE UP (ref 150–400)
POTASSIUM SERPL-SCNC: 3.9 MMOL/L
PROT SERPL-MCNC: 6.1 G/DL
RBC # BLD: 3.59 M/UL — LOW (ref 3.8–5.2)
RBC # FLD: 13.4 % — SIGNIFICANT CHANGE UP (ref 10.3–14.5)
SODIUM SERPL-SCNC: 139 MMOL/L
WBC # BLD: 3.27 K/UL — LOW (ref 3.8–10.5)
WBC # FLD AUTO: 3.27 K/UL — LOW (ref 3.8–10.5)

## 2024-04-02 PROCEDURE — 99214 OFFICE O/P EST MOD 30 MIN: CPT

## 2024-04-11 ENCOUNTER — NON-APPOINTMENT (OUTPATIENT)
Age: 69
End: 2024-04-11

## 2024-04-22 ENCOUNTER — OUTPATIENT (OUTPATIENT)
Dept: OUTPATIENT SERVICES | Facility: HOSPITAL | Age: 69
LOS: 1 days | Discharge: HOME | End: 2024-04-22

## 2024-04-22 DIAGNOSIS — C50.919 MALIGNANT NEOPLASM OF UNSPECIFIED SITE OF UNSPECIFIED FEMALE BREAST: ICD-10-CM

## 2024-04-29 ENCOUNTER — NON-APPOINTMENT (OUTPATIENT)
Age: 69
End: 2024-04-29

## 2024-04-30 ENCOUNTER — RESULT REVIEW (OUTPATIENT)
Age: 69
End: 2024-04-30

## 2024-04-30 ENCOUNTER — APPOINTMENT (OUTPATIENT)
Dept: HEMATOLOGY ONCOLOGY | Facility: CLINIC | Age: 69
End: 2024-04-30
Payer: MEDICARE

## 2024-04-30 VITALS
OXYGEN SATURATION: 99 % | WEIGHT: 165.35 LBS | HEART RATE: 83 BPM | DIASTOLIC BLOOD PRESSURE: 73 MMHG | BODY MASS INDEX: 28.4 KG/M2 | RESPIRATION RATE: 16 BRPM | SYSTOLIC BLOOD PRESSURE: 113 MMHG | TEMPERATURE: 97.5 F

## 2024-04-30 LAB
ALBUMIN SERPL ELPH-MCNC: 4 G/DL
ALP BLD-CCNC: 77 U/L
ALT SERPL-CCNC: 13 U/L
ANION GAP SERPL CALC-SCNC: 15 MMOL/L
ANISOCYTOSIS BLD QL: SLIGHT — SIGNIFICANT CHANGE UP
AST SERPL-CCNC: 17 U/L
BASOPHILS # BLD AUTO: 0.05 K/UL — SIGNIFICANT CHANGE UP (ref 0–0.2)
BASOPHILS NFR BLD AUTO: 2 % — SIGNIFICANT CHANGE UP (ref 0–2)
BILIRUB SERPL-MCNC: 0.4 MG/DL
BUN SERPL-MCNC: 13 MG/DL
CALCIUM SERPL-MCNC: 8.6 MG/DL
CANCER AG27-29 SERPL-ACNC: 35 U/ML
CEA SERPL-MCNC: 6.1 NG/ML
CHLORIDE SERPL-SCNC: 100 MMOL/L
CO2 SERPL-SCNC: 21 MMOL/L
CREAT SERPL-MCNC: 0.81 MG/DL
EGFR: 79 ML/MIN/1.73M2
EOSINOPHIL # BLD AUTO: 0 K/UL — SIGNIFICANT CHANGE UP (ref 0–0.5)
EOSINOPHIL NFR BLD AUTO: 0 % — SIGNIFICANT CHANGE UP (ref 0–6)
GLUCOSE SERPL-MCNC: 99 MG/DL
HCT VFR BLD CALC: 35.3 % — SIGNIFICANT CHANGE UP (ref 34.5–45)
HGB BLD-MCNC: 12.1 G/DL — SIGNIFICANT CHANGE UP (ref 11.5–15.5)
IMM GRANULOCYTES NFR BLD AUTO: 0.4 % — SIGNIFICANT CHANGE UP (ref 0–0.9)
LYMPHOCYTES # BLD AUTO: 1.03 K/UL — SIGNIFICANT CHANGE UP (ref 1–3.3)
LYMPHOCYTES # BLD AUTO: 41.9 % — SIGNIFICANT CHANGE UP (ref 13–44)
MACROCYTES BLD QL: SLIGHT — SIGNIFICANT CHANGE UP
MCHC RBC-ENTMCNC: 34.3 G/DL — SIGNIFICANT CHANGE UP (ref 32–36)
MCHC RBC-ENTMCNC: 36 PG — HIGH (ref 27–34)
MCV RBC AUTO: 105.1 FL — HIGH (ref 80–100)
MONOCYTES # BLD AUTO: 0.32 K/UL — SIGNIFICANT CHANGE UP (ref 0–0.9)
MONOCYTES NFR BLD AUTO: 13 % — SIGNIFICANT CHANGE UP (ref 2–14)
NEUTROPHILS # BLD AUTO: 1.05 K/UL — LOW (ref 1.8–7.4)
NEUTROPHILS NFR BLD AUTO: 42.7 % — LOW (ref 43–77)
NRBC # BLD: 0 /100 WBCS — SIGNIFICANT CHANGE UP (ref 0–0)
PLAT MORPH BLD: NORMAL — SIGNIFICANT CHANGE UP
PLATELET # BLD AUTO: 146 K/UL — LOW (ref 150–400)
POTASSIUM SERPL-SCNC: 3.9 MMOL/L
PROT SERPL-MCNC: 6.1 G/DL
RBC # BLD: 3.36 M/UL — LOW (ref 3.8–5.2)
RBC # FLD: 13.9 % — SIGNIFICANT CHANGE UP (ref 10.3–14.5)
RBC BLD AUTO: ABNORMAL
SODIUM SERPL-SCNC: 136 MMOL/L
WBC # BLD: 2.46 K/UL — LOW (ref 3.8–10.5)
WBC # FLD AUTO: 2.46 K/UL — LOW (ref 3.8–10.5)

## 2024-04-30 PROCEDURE — 99214 OFFICE O/P EST MOD 30 MIN: CPT

## 2024-05-28 ENCOUNTER — APPOINTMENT (OUTPATIENT)
Dept: HEMATOLOGY ONCOLOGY | Facility: CLINIC | Age: 69
End: 2024-05-28
Payer: MEDICARE

## 2024-05-28 ENCOUNTER — RESULT REVIEW (OUTPATIENT)
Age: 69
End: 2024-05-28

## 2024-05-28 VITALS
RESPIRATION RATE: 16 BRPM | DIASTOLIC BLOOD PRESSURE: 80 MMHG | HEART RATE: 84 BPM | OXYGEN SATURATION: 99 % | BODY MASS INDEX: 28.48 KG/M2 | SYSTOLIC BLOOD PRESSURE: 128 MMHG | TEMPERATURE: 97.4 F | WEIGHT: 165.78 LBS

## 2024-05-28 LAB
ALBUMIN SERPL ELPH-MCNC: 4.4 G/DL
ALP BLD-CCNC: 76 U/L
ALT SERPL-CCNC: 16 U/L
ANION GAP SERPL CALC-SCNC: 15 MMOL/L
AST SERPL-CCNC: 16 U/L
BASOPHILS # BLD AUTO: 0.04 K/UL — SIGNIFICANT CHANGE UP (ref 0–0.2)
BASOPHILS NFR BLD AUTO: 1.3 % — SIGNIFICANT CHANGE UP (ref 0–2)
BILIRUB SERPL-MCNC: 0.4 MG/DL
BUN SERPL-MCNC: 10 MG/DL
CALCIUM SERPL-MCNC: 9 MG/DL
CEA SERPL-MCNC: 6.6 NG/ML
CHLORIDE SERPL-SCNC: 103 MMOL/L
CO2 SERPL-SCNC: 21 MMOL/L
CREAT SERPL-MCNC: 0.78 MG/DL
EGFR: 83 ML/MIN/1.73M2
EOSINOPHIL # BLD AUTO: 0 K/UL — SIGNIFICANT CHANGE UP (ref 0–0.5)
EOSINOPHIL NFR BLD AUTO: 0 % — SIGNIFICANT CHANGE UP (ref 0–6)
GLUCOSE SERPL-MCNC: 106 MG/DL
HCT VFR BLD CALC: 39.3 % — SIGNIFICANT CHANGE UP (ref 34.5–45)
HGB BLD-MCNC: 13.3 G/DL — SIGNIFICANT CHANGE UP (ref 11.5–15.5)
IMM GRANULOCYTES NFR BLD AUTO: 0.3 % — SIGNIFICANT CHANGE UP (ref 0–0.9)
LYMPHOCYTES # BLD AUTO: 1.1 K/UL — SIGNIFICANT CHANGE UP (ref 1–3.3)
LYMPHOCYTES # BLD AUTO: 34.4 % — SIGNIFICANT CHANGE UP (ref 13–44)
MCHC RBC-ENTMCNC: 33.8 G/DL — SIGNIFICANT CHANGE UP (ref 32–36)
MCHC RBC-ENTMCNC: 36.2 PG — HIGH (ref 27–34)
MCV RBC AUTO: 107.1 FL — HIGH (ref 80–100)
MONOCYTES # BLD AUTO: 0.39 K/UL — SIGNIFICANT CHANGE UP (ref 0–0.9)
MONOCYTES NFR BLD AUTO: 12.2 % — SIGNIFICANT CHANGE UP (ref 2–14)
NEUTROPHILS # BLD AUTO: 1.66 K/UL — LOW (ref 1.8–7.4)
NEUTROPHILS NFR BLD AUTO: 51.8 % — SIGNIFICANT CHANGE UP (ref 43–77)
NRBC # BLD: 0 /100 WBCS — SIGNIFICANT CHANGE UP (ref 0–0)
PLATELET # BLD AUTO: 185 K/UL — SIGNIFICANT CHANGE UP (ref 150–400)
POTASSIUM SERPL-SCNC: 4.3 MMOL/L
PROT SERPL-MCNC: 6.5 G/DL
RBC # BLD: 3.67 M/UL — LOW (ref 3.8–5.2)
RBC # FLD: 14.1 % — SIGNIFICANT CHANGE UP (ref 10.3–14.5)
SODIUM SERPL-SCNC: 139 MMOL/L
WBC # BLD: 3.2 K/UL — LOW (ref 3.8–10.5)
WBC # FLD AUTO: 3.2 K/UL — LOW (ref 3.8–10.5)

## 2024-05-28 PROCEDURE — 99214 OFFICE O/P EST MOD 30 MIN: CPT

## 2024-05-28 NOTE — HISTORY OF PRESENT ILLNESS
[100: Normal, no complaints, no evidence of disease.] : 100: Normal, no complaints, no evidence of disease. [de-identified] : The patient first noticed something in her left breast in .  She underwent an excisional biopsy and reports that it was a "benign cyst."  The patient reports that beginning several years ago, she noticed "something" in the exact place where the prior cyst had been, and thought this was perhaps another cyst.  She chose to follow it.  Approximately 1-2 years ago, this caused skin breakdown and the patient reports she thought it was an abscess and was applying antibiotics on it.  In he presented to her psychiatry nurse practitioner and showed her the left breast lesion, was told this was breast cancer.  She consequently presented to a surgical physician at [facility].  That physician apparently performed a chest x-ray which was reportedly normal, an EKG which was normal, and a mammogram and sonogram (the results of which I do not have).  The patient was offered to have an excisional biopsy the following day, which she did on 2022 at [facility] in [place].  The outside pathology report shows invasive duct carcinoma, poorly differentiated, Rochester score 8, measuring 4 cm, with angiolymphatic space invasion present, and carcinoma extending to the epidermis associated with ulceration, as well as to the peripheral and deep margins, estrogen receptor returned positive, progesterone receptor positive, and HER-2/samson negative (0), and Ki-67 was 8%.  She required wound care post-operatively due to very poor healing  She was seen at  [facility] ([facility])  by Dr. Silver Fitzgerald in consultation regarding further treatment recommendations.   The patient reports that her  was the  on  at the [facility] site and subsequently developed pancreatic cancer, with the patient reporting he  a horrible death and his treatment team treated him very very poorly.  Subsequently her son also developed an unspecified illness and presented for medical care, and was sent out by the team reportedly thinking he was drug-seeking, and subsequently he suffered a severe medical illness; he then became an alcoholic and  of unspecified causes soon afterwards.    She reports that she has a generalized anxiety disorder for which she is followed by a nurse practitioner who writes prescriptions as well as a /therapist.  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  She reports that is why she has not seen a physician or sought medical care for over 10 years and has not had a mammogram for at least that many years.  The patient had PET/CT in 2022 which showed metastatic disease of the bones.  The patient was started on Anastrozole and palbociclib on 22. She travels back and forth from [place] for routine follow-up visits.  She had [employer] testing done on 24 that showed no actionable mutations.   [de-identified] : Patient returns today to rule out progression or recurrence of breast cancer and to assess treatment toxicity. Started anastrozole and palbociclib on 7/25/22 and remains with stable disease.  Today is D1 of current cycle.  Denies any treatment related complaints.  Continues to experience anxiety and guilt regarding her cancer.  She is followed by psych who d/c her antidepressants because of side effects.  She is currently on xanax, which she states helps somewhat.   She states she ate bad broccoli sprouts about a week ago and developed nausea and vomiting that lasted about 3 days.  She states she is feeling better now.  Notes a good appetite, stable weight and excellent performance status.  She remains active, walks about 5 miles a day.   PET/CT 1/9/2024 1. When compared with PET/CT 7/27/2023, the larger left upper inner quadrant breast mass is stable in size and shows continued decrease in FDG avidity. There is mild increase in size and FDG avidity of a smaller soft tissue mass in the left breast, slowly increasing in FDG avidity compared to the prior two PET scans, concerning for progression at this site. 2. Similar size and mildly increased FDG avidity of a left axillary lymph node. 3. Stable appearance of bone lesions, with stable low-level or no FDG avidity.   PET/CT, 7/27/23- IMPRESSION: When compared with prior FDG-PET/CT dated 1/12/2023: 1. Continued decrease in metabolic activity within the larger left upper inner quadrant breast mass. Increased FDG avidity within the smaller inferolateral left breast mass which is unchanged in size. 2. Stable to decreased avidity of left axillary/intramammary lymph nodes. 3. Similar appearing sclerosis of the bony lesions, with mild or no FDG avidity which is decreased in metabolism, compatible with response to interval therapy. 4. No new lesion.  PET/CT, 1/12/23- IMPRESSION: Marked improvement in FDG avid lesions since the prior PET/CT in 7/2022: 1. Improvement in the hypermetabolic left breast masses and left axillary/internal mammary nodes as detailed above. 2. More sclerosis and less intense activity in FDG avid bone lesions indicative of bone remodeling as evidence of healing.  PET/CT scan, 7/11/22- IMPRESSION: Abnormal FDG-PET/CT scan. 1. FDG-avid left breast masses, medial aspect upper left breast, and central aspect left breast, are compatible with malignancy. The mass in the medial aspect of the upper left breast involves the overlying skin and is inseparable from the chest wall musculature. The mass in the central left breast is not well delineated on CT. Further evaluation may be performed with MRI. 2. FDG-avid left axillary lymph node metastases. 3. FDG-avid mixed lytic and sclerotic bone metastases in axial skeleton and proximal right femur. 4. Large hiatal hernia with distended esophagus, without associated hypermetabolism. Please correlate clinically.

## 2024-05-28 NOTE — ASSESSMENT
[FreeTextEntry1] : 68 yo woman with metastatic left breast cancer; ER+ 80-85%; NM+ 65-75%; HER2 low (IHC 1+), initial diagnosis in 7/2022 with evidence of bone mets.  -continued on Palbociclib/anastrozole with overall stable disease noted on scans with last PET 1/2024 showing mixed response with 2 lesions in the left breast and resolution of bone mets. -CBC okay to resume next cycle of palbo today.  -reviewed labs with patient, including CEA and Ca 27.29, stable. -plan for repeat PET/CT in 7/2024 unless labs become abnormal or persistent symptoms arise. -Patient had the opportunity to have all her questions answered to her satisfaction.  Emotional support provided. - discussed following up in office q 2 months, sooner as needed, and having labs done locally every month.  The patient states she will think about it and let us know.  In meantime, follow up apt in 1 month was made.

## 2024-05-28 NOTE — PHYSICAL EXAM
[Fully active, able to carry on all pre-disease performance without restriction] : Status 0 - Fully active, able to carry on all pre-disease performance without restriction [Normal] : affect appropriate [de-identified] : Left breast wound site closed with good granulation tissue, with stable nodularity at the upper end w/o change; measures 5X5cm; no other underlying palpable masses.  No plapable axillary nodes. Right breast with no discrete palpable mass, no palpable axillary nodes. [de-identified] : teary at times

## 2024-05-29 LAB — CANCER AG27-29 SERPL-ACNC: 38.9 U/ML

## 2024-06-04 ENCOUNTER — APPOINTMENT (OUTPATIENT)
Dept: HEMATOLOGY ONCOLOGY | Facility: CLINIC | Age: 69
End: 2024-06-04

## 2024-06-25 ENCOUNTER — RESULT REVIEW (OUTPATIENT)
Age: 69
End: 2024-06-25

## 2024-06-25 ENCOUNTER — APPOINTMENT (OUTPATIENT)
Dept: HEMATOLOGY ONCOLOGY | Facility: CLINIC | Age: 69
End: 2024-06-25
Payer: MEDICARE

## 2024-06-25 VITALS
DIASTOLIC BLOOD PRESSURE: 86 MMHG | TEMPERATURE: 97.8 F | BODY MASS INDEX: 28.59 KG/M2 | RESPIRATION RATE: 16 BRPM | OXYGEN SATURATION: 99 % | WEIGHT: 166.45 LBS | SYSTOLIC BLOOD PRESSURE: 129 MMHG | HEART RATE: 79 BPM

## 2024-06-25 DIAGNOSIS — C50.919 MALIGNANT NEOPLASM OF UNSPECIFIED SITE OF UNSPECIFIED FEMALE BREAST: ICD-10-CM

## 2024-06-25 DIAGNOSIS — R45.89 OTHER SYMPTOMS AND SIGNS INVOLVING EMOTIONAL STATE: ICD-10-CM

## 2024-06-25 DIAGNOSIS — Z79.899 OTHER LONG TERM (CURRENT) DRUG THERAPY: ICD-10-CM

## 2024-06-25 DIAGNOSIS — Z79.811 LONG TERM (CURRENT) USE OF AROMATASE INHIBITORS: ICD-10-CM

## 2024-06-25 PROCEDURE — G2211 COMPLEX E/M VISIT ADD ON: CPT

## 2024-06-25 PROCEDURE — 99214 OFFICE O/P EST MOD 30 MIN: CPT

## 2024-06-26 ENCOUNTER — NON-APPOINTMENT (OUTPATIENT)
Age: 69
End: 2024-06-26

## 2024-06-26 PROBLEM — R45.89 ANXIETY ABOUT HEALTH: Status: ACTIVE | Noted: 2023-07-27

## 2024-06-26 PROBLEM — C50.919 BREAST CANCER, STAGE 4: Status: ACTIVE | Noted: 2022-07-19

## 2024-06-26 PROBLEM — Z79.899 HIGH RISK MEDICATION USE: Status: ACTIVE | Noted: 2022-08-11

## 2024-06-26 PROBLEM — Z79.811 AROMATASE INHIBITOR USE: Status: ACTIVE | Noted: 2022-08-11

## 2024-06-26 LAB
ALBUMIN SERPL ELPH-MCNC: 4.5 G/DL
ALP BLD-CCNC: 80 U/L
ALT SERPL-CCNC: 16 U/L
ANION GAP SERPL CALC-SCNC: 13 MMOL/L
AST SERPL-CCNC: 17 U/L
BILIRUB SERPL-MCNC: 0.5 MG/DL
BUN SERPL-MCNC: 13 MG/DL
CALCIUM SERPL-MCNC: 9.2 MG/DL
CANCER AG27-29 SERPL-ACNC: 38.6 U/ML
CEA SERPL-MCNC: 7.4 NG/ML
CHLORIDE SERPL-SCNC: 103 MMOL/L
CO2 SERPL-SCNC: 24 MMOL/L
CREAT SERPL-MCNC: 0.87 MG/DL
EGFR: 73 ML/MIN/1.73M2
GLUCOSE SERPL-MCNC: 108 MG/DL
POTASSIUM SERPL-SCNC: 4.4 MMOL/L
PROT SERPL-MCNC: 6.4 G/DL
SODIUM SERPL-SCNC: 139 MMOL/L

## 2024-07-23 ENCOUNTER — RESULT REVIEW (OUTPATIENT)
Age: 69
End: 2024-07-23

## 2024-07-23 ENCOUNTER — APPOINTMENT (OUTPATIENT)
Dept: HEMATOLOGY ONCOLOGY | Facility: CLINIC | Age: 69
End: 2024-07-23
Payer: MEDICARE

## 2024-07-23 VITALS
RESPIRATION RATE: 18 BRPM | HEART RATE: 103 BPM | WEIGHT: 165.35 LBS | OXYGEN SATURATION: 93 % | TEMPERATURE: 97.6 F | DIASTOLIC BLOOD PRESSURE: 81 MMHG | BODY MASS INDEX: 28.4 KG/M2 | SYSTOLIC BLOOD PRESSURE: 135 MMHG

## 2024-07-23 DIAGNOSIS — R45.89 OTHER SYMPTOMS AND SIGNS INVOLVING EMOTIONAL STATE: ICD-10-CM

## 2024-07-23 DIAGNOSIS — C50.919 MALIGNANT NEOPLASM OF UNSPECIFIED SITE OF UNSPECIFIED FEMALE BREAST: ICD-10-CM

## 2024-07-23 DIAGNOSIS — Z79.899 OTHER LONG TERM (CURRENT) DRUG THERAPY: ICD-10-CM

## 2024-07-23 DIAGNOSIS — Z79.811 LONG TERM (CURRENT) USE OF AROMATASE INHIBITORS: ICD-10-CM

## 2024-07-23 LAB
ALBUMIN SERPL ELPH-MCNC: 4.3 G/DL
ALP BLD-CCNC: 67 U/L
ALT SERPL-CCNC: 26 U/L
ANION GAP SERPL CALC-SCNC: 12 MMOL/L
AST SERPL-CCNC: 22 U/L
BILIRUB SERPL-MCNC: 0.6 MG/DL
BUN SERPL-MCNC: 16 MG/DL
CALCIUM SERPL-MCNC: 9.3 MG/DL
CEA SERPL-MCNC: 7.9 NG/ML
CHLORIDE SERPL-SCNC: 104 MMOL/L
CO2 SERPL-SCNC: 25 MMOL/L
CREAT SERPL-MCNC: 0.81 MG/DL
EGFR: 79 ML/MIN/1.73M2
GLUCOSE SERPL-MCNC: 107 MG/DL
POTASSIUM SERPL-SCNC: 4.7 MMOL/L
PROT SERPL-MCNC: 6.3 G/DL
SODIUM SERPL-SCNC: 141 MMOL/L

## 2024-07-23 PROCEDURE — 99214 OFFICE O/P EST MOD 30 MIN: CPT

## 2024-07-23 PROCEDURE — G2211 COMPLEX E/M VISIT ADD ON: CPT

## 2024-07-23 RX ORDER — VENLAFAXINE 37.5 MG/1
37.5 TABLET ORAL DAILY
Qty: 30 | Refills: 4 | Status: ACTIVE | COMMUNITY
Start: 2024-07-23 | End: 1900-01-01

## 2024-07-23 RX ORDER — FAMOTIDINE 40 MG/1
40 TABLET, FILM COATED ORAL DAILY
Qty: 30 | Refills: 5 | Status: ACTIVE | COMMUNITY
Start: 2024-07-23 | End: 1900-01-01

## 2024-07-23 NOTE — PHYSICAL EXAM
[Fully active, able to carry on all pre-disease performance without restriction] : Status 0 - Fully active, able to carry on all pre-disease performance without restriction [Normal] : affect appropriate [de-identified] : Left breast wound site closed with good granulation tissue, with stable nodularity at the upper end w/o change; measures 5X5cm; no other underlying palpable masses.  No plapable axillary nodes. Right breast with no discrete palpable mass, no palpable axillary nodes. [de-identified] : teary at times

## 2024-07-23 NOTE — HISTORY OF PRESENT ILLNESS
[100: Normal, no complaints, no evidence of disease.] : 100: Normal, no complaints, no evidence of disease. [de-identified] : The patient first noticed something in her left breast in .  She underwent an excisional biopsy and reports that it was a "benign cyst."  The patient reports that beginning several years ago, she noticed "something" in the exact place where the prior cyst had been, and thought this was perhaps another cyst.  She chose to follow it.  Approximately 1-2 years ago, this caused skin breakdown and the patient reports she thought it was an abscess and was applying antibiotics on it.  In  she presented to her psychiatry nurse practitioner and showed her the left breast lesion, was told this was breast cancer.  She consequently presented to a surgical physician at [facility].  That physician apparently performed a chest x-ray which was reportedly normal, an EKG which was normal, and a mammogram and sonogram (the results of which I do not have).  The patient was offered to have an excisional biopsy the following day, which she did on 2022 at [facility] in [place].  The outside pathology report shows invasive duct carcinoma, poorly differentiated, Kalani score 8, measuring 4 cm, with angiolymphatic space invasion present, and carcinoma extending to the epidermis associated with ulceration, as well as to the peripheral and deep margins, estrogen receptor returned positive, progesterone receptor positive, and HER-2/samson negative (0), and Ki-67 was 8%.  She required wound care post-operatively due to very poor healing  She was seen at  [facility] (formerly [facility])  by Dr. Silver Fitzgerald in consultation regarding further treatment recommendations.   The patient reports that her  was the  on  at the [facility] site and subsequently developed pancreatic cancer, with the patient reporting he  a horrible death and his treatment team treated him very very poorly.  Subsequently her son also developed an unspecified illness and presented for medical care, and was sent out by the team reportedly thinking he was drug-seeking, and subsequently he suffered a severe medical illness; he then became an alcoholic and  of unspecified causes soon afterwards.    She reports that she has a generalized anxiety disorder for which she is followed by a nurse practitioner who writes prescriptions as well as a /therapist.  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  She reports that is why she has not seen a physician or sought medical care for over 10 years and has not had a mammogram for at least that many years.  The patient had PET/CT in 2022 which showed metastatic disease of the bones.  The patient was started on Anastrozole and palbociclib on 22. She travels back and forth from [place] for routine follow-up visits.  She had [employer] testing done on 24 that showed no actionable mutations.   [de-identified] : Patient returns today to rule out progression or recurrence of breast cancer and to assess treatment toxicity. Started anastrozole and palbociclib on 7/25/22 and remains with stable disease.  Today is D1 of current cycle.  Denies any treatment related complaints.  Continues to experience anxiety and depression regarding her cancer which is affecting her quality of life.  She is followed by psych and psychology. Recently started on Wellbutrin which she stopped after 4-days because of belching.  She is currently on xanax, which she states helps somewhat. She also takes ambien to sleep.  Reports belching mostly in the early mornings for the last 2-weeks. Patient states she was doing deep breathing exercises with walking around the same time when this started. Started melatonin 60mg at nights due to reading online materials.  Notes a good appetite, stable weight and excellent performance status.  She remains active, walks about 3-5 miles a day. Helping with the homeless community.   PET/CT 1/9/2024 1. When compared with PET/CT 7/27/2023, the larger left upper inner quadrant breast mass is stable in size and shows continued decrease in FDG avidity. There is mild increase in size and FDG avidity of a smaller soft tissue mass in the left breast, slowly increasing in FDG avidity compared to the prior two PET scans, concerning for progression at this site. 2. Similar size and mildly increased FDG avidity of a left axillary lymph node. 3. Stable appearance of bone lesions, with stable low-level or no FDG avidity.   PET/CT, 7/27/23- IMPRESSION: When compared with prior FDG-PET/CT dated 1/12/2023: 1. Continued decrease in metabolic activity within the larger left upper inner quadrant breast mass. Increased FDG avidity within the smaller inferolateral left breast mass which is unchanged in size. 2. Stable to decreased avidity of left axillary/intramammary lymph nodes. 3. Similar appearing sclerosis of the bony lesions, with mild or no FDG avidity which is decreased in metabolism, compatible with response to interval therapy. 4. No new lesion.  PET/CT, 1/12/23- IMPRESSION: Marked improvement in FDG avid lesions since the prior PET/CT in 7/2022: 1. Improvement in the hypermetabolic left breast masses and left axillary/internal mammary nodes as detailed above. 2. More sclerosis and less intense activity in FDG avid bone lesions indicative of bone remodeling as evidence of healing.  PET/CT scan, 7/11/22- IMPRESSION: Abnormal FDG-PET/CT scan. 1. FDG-avid left breast masses, medial aspect upper left breast, and central aspect left breast, are compatible with malignancy. The mass in the medial aspect of the upper left breast involves the overlying skin and is inseparable from the chest wall musculature. The mass in the central left breast is not well delineated on CT. Further evaluation may be performed with MRI. 2. FDG-avid left axillary lymph node metastases. 3. FDG-avid mixed lytic and sclerotic bone metastases in axial skeleton and proximal right femur. 4. Large hiatal hernia with distended esophagus, without associated hypermetabolism. Please correlate clinically.

## 2024-07-23 NOTE — ASSESSMENT
[FreeTextEntry1] : 68 yo woman with metastatic left breast cancer; ER+ 80-85%; ND+ 65-75%; HER2 low (IHC 1+), initial diagnosis in 7/2022 with evidence of bone mets.  -continued on Palbociclib/anastrozole, tolerating well.  -CBC okay to resume next cycle of palbo today.  -reviewed labs with patient, including CEA and Ca 27.29, stable. -plan for repeat PET/CT in 7/2024, however the patient would like to have done in 8/2024, unless labs become abnormal or persistent symptoms arise. Order placed. -Ongoing anxiety about health. Spoke to one of our psychiatrist here, Dr. Kidd who saw pt in the past who recommended I start pt on effexor 37.5 MG and slowly titrate to 75 MG after 2-weeks. Pt advised to call after 2-weeks for follow up. Pt advised if any worsening anxiety and thoughts of suicide to go to the ED asap.  -Patient had the opportunity to have all her questions answered to her satisfaction.  Emotional support provided. -follow up in 1 month as scheduled.

## 2024-07-24 LAB — CANCER AG27-29 SERPL-ACNC: 41.5 U/ML

## 2024-08-20 ENCOUNTER — RESULT REVIEW (OUTPATIENT)
Age: 69
End: 2024-08-20

## 2024-08-20 ENCOUNTER — APPOINTMENT (OUTPATIENT)
Dept: NUCLEAR MEDICINE | Facility: IMAGING CENTER | Age: 69
End: 2024-08-20

## 2024-08-20 ENCOUNTER — APPOINTMENT (OUTPATIENT)
Dept: HEMATOLOGY ONCOLOGY | Facility: CLINIC | Age: 69
End: 2024-08-20
Payer: MEDICARE

## 2024-08-20 ENCOUNTER — NON-APPOINTMENT (OUTPATIENT)
Age: 69
End: 2024-08-20

## 2024-08-20 ENCOUNTER — EMERGENCY (EMERGENCY)
Facility: HOSPITAL | Age: 69
LOS: 1 days | Discharge: HOME | End: 2024-08-20
Attending: EMERGENCY MEDICINE | Admitting: EMERGENCY MEDICINE
Payer: MEDICARE

## 2024-08-20 ENCOUNTER — OUTPATIENT (OUTPATIENT)
Dept: OUTPATIENT SERVICES | Facility: HOSPITAL | Age: 69
LOS: 1 days | End: 2024-08-20
Payer: MEDICARE

## 2024-08-20 VITALS
OXYGEN SATURATION: 100 % | TEMPERATURE: 98 F | HEART RATE: 87 BPM | SYSTOLIC BLOOD PRESSURE: 128 MMHG | RESPIRATION RATE: 18 BRPM | DIASTOLIC BLOOD PRESSURE: 73 MMHG

## 2024-08-20 VITALS
OXYGEN SATURATION: 97 % | DIASTOLIC BLOOD PRESSURE: 83 MMHG | WEIGHT: 164.91 LBS | BODY MASS INDEX: 28.15 KG/M2 | SYSTOLIC BLOOD PRESSURE: 128 MMHG | RESPIRATION RATE: 16 BRPM | TEMPERATURE: 97.7 F | HEIGHT: 63.98 IN | HEART RATE: 92 BPM

## 2024-08-20 VITALS
RESPIRATION RATE: 18 BRPM | HEIGHT: 64 IN | WEIGHT: 154.32 LBS | OXYGEN SATURATION: 98 % | DIASTOLIC BLOOD PRESSURE: 78 MMHG | SYSTOLIC BLOOD PRESSURE: 118 MMHG | TEMPERATURE: 98 F | HEART RATE: 85 BPM

## 2024-08-20 DIAGNOSIS — C50.919 MALIGNANT NEOPLASM OF UNSPECIFIED SITE OF UNSPECIFIED FEMALE BREAST: ICD-10-CM

## 2024-08-20 DIAGNOSIS — F43.23 ADJUSTMENT DISORDER WITH MIXED ANXIETY AND DEPRESSED MOOD: ICD-10-CM

## 2024-08-20 DIAGNOSIS — R45.89 OTHER SYMPTOMS AND SIGNS INVOLVING EMOTIONAL STATE: ICD-10-CM

## 2024-08-20 DIAGNOSIS — Z79.899 OTHER LONG TERM (CURRENT) DRUG THERAPY: ICD-10-CM

## 2024-08-20 DIAGNOSIS — Z79.811 LONG TERM (CURRENT) USE OF AROMATASE INHIBITORS: ICD-10-CM

## 2024-08-20 LAB
ALBUMIN SERPL ELPH-MCNC: 4.3 G/DL
ALP BLD-CCNC: 70 U/L
ALT SERPL-CCNC: 12 U/L
ANION GAP SERPL CALC-SCNC: 15 MMOL/L
AST SERPL-CCNC: 16 U/L
BILIRUB SERPL-MCNC: 0.4 MG/DL
BUN SERPL-MCNC: 15 MG/DL
CALCIUM SERPL-MCNC: 9 MG/DL
CANCER AG27-29 SERPL-ACNC: 37.5 U/ML
CEA SERPL-MCNC: 7 NG/ML
CHLORIDE SERPL-SCNC: 103 MMOL/L
CO2 SERPL-SCNC: 24 MMOL/L
CREAT SERPL-MCNC: 0.91 MG/DL
EGFR: 69 ML/MIN/1.73M2
GLUCOSE SERPL-MCNC: 116 MG/DL
POTASSIUM SERPL-SCNC: 5.2 MMOL/L
PROT SERPL-MCNC: 6.3 G/DL
SODIUM SERPL-SCNC: 142 MMOL/L

## 2024-08-20 PROCEDURE — 99284 EMERGENCY DEPT VISIT MOD MDM: CPT

## 2024-08-20 PROCEDURE — 99215 OFFICE O/P EST HI 40 MIN: CPT

## 2024-08-20 PROCEDURE — 90792 PSYCH DIAG EVAL W/MED SRVCS: CPT

## 2024-08-20 PROCEDURE — 93010 ELECTROCARDIOGRAM REPORT: CPT

## 2024-08-20 PROCEDURE — 78815 PET IMAGE W/CT SKULL-THIGH: CPT

## 2024-08-20 PROCEDURE — A9552: CPT

## 2024-08-20 PROCEDURE — 78815 PET IMAGE W/CT SKULL-THIGH: CPT | Mod: 26,PS,KX,MH

## 2024-08-20 PROCEDURE — G2211 COMPLEX E/M VISIT ADD ON: CPT

## 2024-08-20 NOTE — ED ADULT NURSE NOTE - NSFALLUNIVINTERV_ED_ALL_ED
Bed/Stretcher in lowest position, wheels locked, appropriate side rails in place/Call bell, personal items and telephone in reach/Instruct patient to call for assistance before getting out of bed/chair/stretcher/Non-slip footwear applied when patient is off stretcher/[place] to call system/Physically safe environment - no spills, clutter or unnecessary equipment/Purposeful proactive rounding/Room/bathroom lighting operational, light cord in reach

## 2024-08-20 NOTE — ED BEHAVIORAL HEALTH ASSESSMENT NOTE - RISK ASSESSMENT
Risk factors include serious medical illness, h/o depression and anxiety, recent passive SI.   Protective factors include no suicide attempts, no violence history, medication compliance, no access to guns, no substance abuse, supportive family, no active suicidal ideation or homicidal ideation, future-oriented, identifies reasons for living.   Pt is not at acutely elevated risk of harm to self or others.

## 2024-08-20 NOTE — ED BEHAVIORAL HEALTH ASSESSMENT NOTE - SUMMARY
68 y/o female, , noncaregiver, lives with adult son (Chetan) in [place], pt has Stage IV breast cancer (diagnosed 2 years ago), travels to [place] every 28 days for oncology appointment at [facility], has PET Scan every 6 months, long history of depression and anxiety, no h/o psych admissions, currently has a psychiatrist in [place] who prescribes Xanax, no h/o self-injurious behavior or suicide attempts, no h/o violence or legal issues, no h/o substance abuse, referred by oncology NP "Edna" for reportedly verbalizing SI with possible plan to overdose on meds.   Pt reports increased anxiety in the context of PET Scan today (8/20/24). She is fearful that her cancer has spread to other organs. Pt states that she experienced passive SI earlier today (8/20/24), but she denies active suicidal ideation, intent, or plan. Pt clarifies that she meant IF she were to experience active SI with a plan, she would likely overdose on pills. Pt identifies reasons for living, is future-oriented, and engages in safety planning. She will go to her PET Scan appointment today (8/20/24) at 1:00 PM. Pt's son (Chetan) denies acute safety concerns. Pt does not present an acute danger to self or others and does not meet criteria for involuntary psychiatric admission at this time. Pt declines voluntary psychiatric admission at this time.

## 2024-08-20 NOTE — ED ADULT NURSE NOTE - NS_BH TRG QUESTION3_ED_ALL_ED
Appt or walk in  
LVM advising of PCP response. Will done the encounter. Advised patient to call the office with any other questions    Last Visit Date: 12/20/2023   Next Visit Date: Visit date not found   
LVM for Pt to call back  
LVM for Pt to call back.  
Patient calling into the office, states that he went to [facility] urgent care for a rash and we was prescribed permethrin 5% cream for a rash on bilateral hands, feet and chest. He states that he is out of this now and is still needing it. OV note from that date of service is in patients . Writer pended medication for PCP review    Last Visit Date: 12/20/2023   Next Visit Date: Visit date not found   
No

## 2024-08-20 NOTE — ED BEHAVIORAL HEALTH ASSESSMENT NOTE - HPI (INCLUDE ILLNESS QUALITY, SEVERITY, DURATION, TIMING, CONTEXT, MODIFYING FACTORS, ASSOCIATED SIGNS AND SYMPTOMS)
68 y/o female, , noncaregiver, lives with adult son (Chetan) in [place], pt has Stage IV breast cancer (diagnosed 2 years ago), travels to [place] every 28 days for oncology appointment at [facility], has PET Scan every 6 months, long history of depression and anxiety, no h/o psych admissions, currently has a psychiatrist in [place] who prescribes Xanax, no h/o self-injurious behavior or suicide attempts, no h/o violence or legal issues, no h/o substance abuse, referred by oncology NP "Edna" for reportedly verbalizing SI with possible plan to overdose on meds.    Pt reports intermittently depressed mood/anxiety since her  and one of her sons  8 and 9 years ago, respectively. She experienced additional stressor of being diagnosed with Stage IV breast cancer 2 years ago. She lives in [place] with her adult son (Chetan). They travel to [place] every 28 days for her oncology appointment at [facility]. She has a PET Scan every 6 months. She is scheduled for PET Scan today (24) at 1:00 PM. She reports increased anxiety over the past few weeks in the context of PET Scan today. She is fearful that her cancer has spread to other organs. Pt states that she experienced passive SI (felt like she would like to die in her sleep) earlier today (24), but she denies active suicidal ideation, intent, or plan. At oncology appointment today, pt clarifies that she told oncology NP "Edna" that  IF she were to experience active SI with a plan, she would likely overdose on pills. Pt adamantly denies any active suicidal ideation, intent, or plan. States she is afraid of death/dying, and she plans to continue her cancer treatment. She cites her son, Chetan, as a strong social support and protective factor. Pt states she would tell Chetan and/or one of her medical providers if she developed an urge to harm/kill herself.   Pt denies homicidal or violent ideation, intent, or plan. She denies manic or psychotic symptoms. States her psychiatrist in [place] prescribes Xanax 2 mg po TID, but pt usually takes "about 1/3 of a pill" TID. She is not on any antidepressants or other psych meds at this time. She denies substance use.

## 2024-08-20 NOTE — ED BEHAVIORAL HEALTH ASSESSMENT NOTE - DESCRIPTION
see HPI cooperative, in good behavioral control; did not require prn meds or restraints  Vital Signs Last 24 Hrs  T(C): 36.7 (20 Aug 2024 12:31), Max: 36.7 (20 Aug 2024 11:01)  T(F): 98.1 (20 Aug 2024 12:31), Max: 98.1 (20 Aug 2024 12:31)  HR: 87 (20 Aug 2024 12:31) (85 - 87)  BP: 128/73 (20 Aug 2024 12:31) (118/78 - 128/73)  BP(mean): --  RR: 18 (20 Aug 2024 12:31) (18 - 18)  SpO2: 100% (20 Aug 2024 12:31) (98% - 100%)    Parameters below as of 20 Aug 2024 12:31  Patient On (Oxygen Delivery Method): room air Stage IV breast cancer

## 2024-08-20 NOTE — ED BEHAVIORAL HEALTH ASSESSMENT NOTE - NSELEVCHRSUICRISK_PSY_ALL_CORE
Chief Complaint   Patient presents with   • URI     since 05/04/2017     Nursing notes reviewed, confirmed      HPI: Haydee Yi is a 20 year old female who presents with a sinus concern. The duration has been for About one week. The quality is described as frontal and maxillary pain and pressure. Associated symptoms include initial cold type symptoms including runny nose, stuffy nose, cough and some GI upset. Also largely resolved with the exception of the sinus problem. She continues to have postnasal drip, throat irritation and a productive cough although she thinks that is due to the sinus drainage. She also notes fatigue.    ROS: No fever    SHx:   History   Smoking Status   • Never Smoker   Smokeless Tobacco   • Never Used       Physical Exam:  Visit Vitals   • /72 (BP Location: [facility], Patient Position: Sitting, Cuff Size: Regular)   • Pulse 85   • Temp 98.6 °F (37 °C) (Tympanic)   • Resp 20   • Wt 79.1 kg   • LMP 05/02/2017 (Exact Date)   • SpO2 99%         Gen: well nourished, NAD. Non-toxic appearing  HEENT: PERRLA, EOMI. The sclerae are anicteric and the conjunctivae are non-injected. TM's are pearly. Oropharynx reveals MMM. Post nasal drainage is noted. The maxillary sinuses are tender to palpation and percussion.  NECK: No LAD  RESP: non-labored. CTA  CV: RRR, without M,G,R.  ABD: Soft, nontender, nondistended. No pedis with no megaly, mass, rebound or guarding    Impression:  1. Acute non-recurrent maxillary sinusitis  - doxycycline monohydrate (MONODOX) 100 MG capsule; Take 1 capsule by mouth 2 times daily for 10 days. Take with food.  Dispense: 20 capsule; Refill: 0  The patient is to follow the printed instructions provided. Use medication as prescribed. She is to follow-up with her personal physician if not resolved. Seek care sooner if worsening or new symptoms develop.     No

## 2024-08-20 NOTE — ED PROVIDER NOTE - PATIENT PORTAL LINK FT
You can access the FollowMyHealth Patient Portal offered by [facility] by registering at the following website: [email or web address]. By joining [facility]’s FollowMyHealth portal, you will also be able to view your health information using other applications (apps) compatible with our system.

## 2024-08-20 NOTE — ED PROVIDER NOTE - CLINICAL SUMMARY MEDICAL DECISION MAKING FREE TEXT BOX
Impression  Suicidal ideations with plan expressed will have psychiatry consulted to see patient  Will get EKG given slight concern of palpitations

## 2024-08-20 NOTE — ED PROVIDER NOTE - OBJECTIVE STATEMENT
69-year-old female with history of breast CVA presents from [facility] for expressing SI with plan.  Patient states does suffer from depression and follows with a psychiatrist/psychologist.  States every 6 months needs blood work, chemo, PET scan and the process of going through it again caused her to express these ideations.  States her plan was to take all her pills.  Patient relates a remote history of  and son passing away prematurely.  Only medical complaint is occasionally having palpitations.

## 2024-08-20 NOTE — ED BEHAVIORAL HEALTH ASSESSMENT NOTE - DETAILS
unknown number; informed son (Chetan) of dispo deferred in ED setting see HPI call 911 or return to ED if symptoms worsen or if pt develops thoughts of harming self or others

## 2024-08-20 NOTE — ED PROVIDER NOTE - NSFOLLOWUPINSTRUCTIONS_ED_ALL_ED_FT
Please proceed to getting your PET scan at 1 PM    Follow safety plan set forth by psychiatrist    Return to ER for any worsening of complaints or any other symptoms where you feel you require immediate attention.

## 2024-08-20 NOTE — ED ADULT NURSE NOTE - OBJECTIVE STATEMENT
Pt arrives by ems Pt arrives by ems, made SI statements, pt states she did not mean them, was able to safety plan with psych. Pt currently diagnosed breast cancer, and feels overwhelmed. Denies hi,ah,vh,etoh, dtug use.

## 2024-08-20 NOTE — ED PROVIDER NOTE - PHYSICAL EXAMINATION
Physical exam  Nontoxic-appearing  Morose  Clear to auscultation bilaterally  S1-S2 no murmurs rubs or gallops  Abdomen soft nontender nondistended  Extremities no edema

## 2024-08-20 NOTE — ED ADULT TRIAGE NOTE - CHIEF COMPLAINT QUOTE
pt with lt breast ca, on oral chemo, c/o of increased depression with SI, plan to overdose on pills, pt appears calm and cooperative @ present

## 2024-08-20 NOTE — ED BEHAVIORAL HEALTH ASSESSMENT NOTE - SAFETY PLAN ADDT'L DETAILS
Safety plan discussed with.../Education provided regarding environmental safety / lethal means restriction/Provision of National Suicide Prevention Lifeline 1-167-963-MTEC (6562)

## 2024-08-21 NOTE — END OF VISIT
[FreeTextEntry3] : Patient being seen as per physician's primary plan of care. [Time Spent: ___ minutes] : I have spent [unfilled] minutes of time on the encounter which excludes teaching and separately reported services.

## 2024-08-21 NOTE — HISTORY OF PRESENT ILLNESS
[100: Normal, no complaints, no evidence of disease.] : 100: Normal, no complaints, no evidence of disease. [de-identified] : The patient first noticed something in her left breast in .  She underwent an excisional biopsy and reports that it was a "benign cyst."  The patient reports that beginning several years ago, she noticed "something" in the exact place where the prior cyst had been, and thought this was perhaps another cyst.  She chose to follow it.  Approximately 1-2 years ago, this caused skin breakdown and the patient reports she thought it was an abscess and was applying antibiotics on it.  In  she presented to her psychiatry nurse practitioner and showed her the left breast lesion, was told this was breast cancer.  She consequently presented to a surgical physician at [facility].  That physician apparently performed a chest x-ray which was reportedly normal, an EKG which was normal, and a mammogram and sonogram (the results of which I do not have).  The patient was offered to have an excisional biopsy the following day, which she did on 2022 at [facility] in [place].  The outside pathology report shows invasive duct carcinoma, poorly differentiated, Kalani score 8, measuring 4 cm, with angiolymphatic space invasion present, and carcinoma extending to the epidermis associated with ulceration, as well as to the peripheral and deep margins, estrogen receptor returned positive, progesterone receptor positive, and HER-2/samson negative (0), and Ki-67 was 8%.  She required wound care post-operatively due to very poor healing  She was seen at  [facility] (formerly [facility])  by Dr. Silver Fitzgerald in consultation regarding further treatment recommendations.   The patient reports that her  was the  on  at the [facility] site and subsequently developed pancreatic cancer, with the patient reporting he  a horrible death and his treatment team treated him very very poorly.  Subsequently her son also developed an unspecified illness and presented for medical care, and was sent out by the team reportedly thinking he was drug-seeking, and subsequently he suffered a severe medical illness; he then became an alcoholic and  of unspecified causes soon afterwards.    She reports that she has a generalized anxiety disorder for which she is followed by a nurse practitioner who writes prescriptions as well as a /therapist.  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  She reports that is why she has not seen a physician or sought medical care for over 10 years and has not had a mammogram for at least that many years.  The patient had PET/CT in 2022 which showed metastatic disease of the bones.  The patient was started on Anastrozole and palbociclib on 22. She travels back and forth from [place] for routine follow-up visits.  She had [employer] testing done on 24 that showed no actionable mutations.   [de-identified] : Patient returns today to rule out progression or recurrence of breast cancer and to assess treatment toxicity. Started anastrozole and palbociclib on 7/25/22 and remains with stable disease.  Today is D1 of current cycle.  Denies any treatment related complaints.  She denies any physical complaints. Continues to experience anxiety and depression regarding her cancer which is affecting her quality of life.  She is extremely anxious this past month and today because she is having her PET/CT scan today.  She expressed SI today with plan to take a bottle of pills.  She expressed desire to be admitted to hospital for psychiatric care to "get better".   She stopped taking venlafaxine after short time because of vague side effects.   Notes a good appetite, stable weight and performance status.  She remains active, walks about 3-5 miles a day. Helping with the homeless community.   PET/CT 1/9/2024 1. When compared with PET/CT 7/27/2023, the larger left upper inner quadrant breast mass is stable in size and shows continued decrease in FDG avidity. There is mild increase in size and FDG avidity of a smaller soft tissue mass in the left breast, slowly increasing in FDG avidity compared to the prior two PET scans, concerning for progression at this site. 2. Similar size and mildly increased FDG avidity of a left axillary lymph node. 3. Stable appearance of bone lesions, with stable low-level or no FDG avidity.   PET/CT, 7/27/23- IMPRESSION: When compared with prior FDG-PET/CT dated 1/12/2023: 1. Continued decrease in metabolic activity within the larger left upper inner quadrant breast mass. Increased FDG avidity within the smaller inferolateral left breast mass which is unchanged in size. 2. Stable to decreased avidity of left axillary/intramammary lymph nodes. 3. Similar appearing sclerosis of the bony lesions, with mild or no FDG avidity which is decreased in metabolism, compatible with response to interval therapy. 4. No new lesion.  PET/CT, 1/12/23- IMPRESSION: Marked improvement in FDG avid lesions since the prior PET/CT in 7/2022: 1. Improvement in the hypermetabolic left breast masses and left axillary/internal mammary nodes as detailed above. 2. More sclerosis and less intense activity in FDG avid bone lesions indicative of bone remodeling as evidence of healing.  PET/CT scan, 7/11/22- IMPRESSION: Abnormal FDG-PET/CT scan. 1. FDG-avid left breast masses, medial aspect upper left breast, and central aspect left breast, are compatible with malignancy. The mass in the medial aspect of the upper left breast involves the overlying skin and is inseparable from the chest wall musculature. The mass in the central left breast is not well delineated on CT. Further evaluation may be performed with MRI. 2. FDG-avid left axillary lymph node metastases. 3. FDG-avid mixed lytic and sclerotic bone metastases in axial skeleton and proximal right femur. 4. Large hiatal hernia with distended esophagus, without associated hypermetabolism. Please correlate clinically.

## 2024-08-21 NOTE — ASSESSMENT
[FreeTextEntry1] : 70 yo woman with metastatic left breast cancer; ER+ 80-85%; AZ+ 65-75%; HER2 low (IHC 1+), initial diagnosis in 7/2022 with evidence of bone mets.  -continued on Palbociclib/anastrozole, tolerating well.  -CBC okay to resume next cycle of palbo today.  -reviewed labs with patient, including CEA and Ca 27.29, stable. -for repeat PET/CT today  Ongoing anxiety about health - The patient expressed SI today with plan.   was called to room and EMS was called and patient transported to [facility] for further evaluation.  Her son was also notified of plan, which he agreed with.   -Patient had the opportunity to have all her questions answered to her satisfaction.  Emotional support provided. -follow up in 1 month as scheduled.    ** ADDENDUM**  The patient was evaluated at [facility] ER and discharged.  Spoke to patient and she is upset she wasn't admitted.  She and her son were given the address and number of [facility], for patient to go for further evaluation.  The patient states she spoke with her psychologist who states she will admit her to a facility in [facility] so she can be closer to her son.  She is going to drive back to [place] tomorrow with her son to be admitted.  She denies any SI/SH at this time and is happy/relief she will be admitted to a facility for the help she needs.

## 2024-08-21 NOTE — PHYSICAL EXAM
[Fully active, able to carry on all pre-disease performance without restriction] : Status 0 - Fully active, able to carry on all pre-disease performance without restriction [Normal] : affect appropriate [de-identified] : Left breast wound site closed with good granulation tissue, with stable nodularity at the upper end w/o change; measures 5X5cm; no other underlying palpable masses.  No plapable axillary nodes. Right breast with no discrete palpable mass, no palpable axillary nodes. [de-identified] : teary at times

## 2024-08-26 ENCOUNTER — TRANSCRIPTION ENCOUNTER (OUTPATIENT)
Age: 69
End: 2024-08-26

## 2024-08-27 ENCOUNTER — TRANSCRIPTION ENCOUNTER (OUTPATIENT)
Age: 69
End: 2024-08-27

## 2024-09-03 ENCOUNTER — TRANSCRIPTION ENCOUNTER (OUTPATIENT)
Age: 69
End: 2024-09-03

## 2024-09-10 ENCOUNTER — OUTPATIENT (OUTPATIENT)
Dept: OUTPATIENT SERVICES | Facility: HOSPITAL | Age: 69
LOS: 1 days | Discharge: HOME | End: 2024-09-10

## 2024-09-10 DIAGNOSIS — C50.919 MALIGNANT NEOPLASM OF UNSPECIFIED SITE OF UNSPECIFIED FEMALE BREAST: ICD-10-CM

## 2024-09-17 ENCOUNTER — RESULT REVIEW (OUTPATIENT)
Age: 69
End: 2024-09-17

## 2024-09-17 ENCOUNTER — APPOINTMENT (OUTPATIENT)
Dept: HEMATOLOGY ONCOLOGY | Facility: CLINIC | Age: 69
End: 2024-09-17
Payer: MEDICARE

## 2024-09-17 VITALS
OXYGEN SATURATION: 96 % | HEART RATE: 99 BPM | DIASTOLIC BLOOD PRESSURE: 83 MMHG | WEIGHT: 163.14 LBS | BODY MASS INDEX: 28.02 KG/M2 | SYSTOLIC BLOOD PRESSURE: 124 MMHG | RESPIRATION RATE: 17 BRPM | TEMPERATURE: 98.2 F

## 2024-09-17 DIAGNOSIS — C50.919 MALIGNANT NEOPLASM OF UNSPECIFIED SITE OF UNSPECIFIED FEMALE BREAST: ICD-10-CM

## 2024-09-17 LAB
BASOPHILS # BLD AUTO: 0.04 K/UL — SIGNIFICANT CHANGE UP (ref 0–0.2)
BASOPHILS NFR BLD AUTO: 1.3 % — SIGNIFICANT CHANGE UP (ref 0–2)
EOSINOPHIL # BLD AUTO: 0.01 K/UL — SIGNIFICANT CHANGE UP (ref 0–0.5)
EOSINOPHIL NFR BLD AUTO: 0.3 % — SIGNIFICANT CHANGE UP (ref 0–6)
HCT VFR BLD CALC: 37.6 % — SIGNIFICANT CHANGE UP (ref 34.5–45)
HGB BLD-MCNC: 12.8 G/DL — SIGNIFICANT CHANGE UP (ref 11.5–15.5)
IMM GRANULOCYTES NFR BLD AUTO: 0.3 % — SIGNIFICANT CHANGE UP (ref 0–0.9)
LYMPHOCYTES # BLD AUTO: 0.63 K/UL — LOW (ref 1–3.3)
LYMPHOCYTES # BLD AUTO: 20.5 % — SIGNIFICANT CHANGE UP (ref 13–44)
MCHC RBC-ENTMCNC: 34 G/DL — SIGNIFICANT CHANGE UP (ref 32–36)
MCHC RBC-ENTMCNC: 36.2 PG — HIGH (ref 27–34)
MCV RBC AUTO: 106.2 FL — HIGH (ref 80–100)
MONOCYTES # BLD AUTO: 0.44 K/UL — SIGNIFICANT CHANGE UP (ref 0–0.9)
MONOCYTES NFR BLD AUTO: 14.3 % — HIGH (ref 2–14)
NEUTROPHILS # BLD AUTO: 1.95 K/UL — SIGNIFICANT CHANGE UP (ref 1.8–7.4)
NEUTROPHILS NFR BLD AUTO: 63.3 % — SIGNIFICANT CHANGE UP (ref 43–77)
NRBC # BLD: 0 /100 WBCS — SIGNIFICANT CHANGE UP (ref 0–0)
PLATELET # BLD AUTO: 187 K/UL — SIGNIFICANT CHANGE UP (ref 150–400)
RBC # BLD: 3.54 M/UL — LOW (ref 3.8–5.2)
RBC # FLD: 13.9 % — SIGNIFICANT CHANGE UP (ref 10.3–14.5)
WBC # BLD: 3.08 K/UL — LOW (ref 3.8–10.5)
WBC # FLD AUTO: 3.08 K/UL — LOW (ref 3.8–10.5)

## 2024-09-17 PROCEDURE — 99215 OFFICE O/P EST HI 40 MIN: CPT

## 2024-09-17 PROCEDURE — G2211 COMPLEX E/M VISIT ADD ON: CPT

## 2024-09-18 ENCOUNTER — TRANSCRIPTION ENCOUNTER (OUTPATIENT)
Age: 69
End: 2024-09-18

## 2024-09-18 ENCOUNTER — NON-APPOINTMENT (OUTPATIENT)
Age: 69
End: 2024-09-18

## 2024-09-18 LAB
ALBUMIN SERPL ELPH-MCNC: 4.3 G/DL
ALP BLD-CCNC: 71 U/L
ALT SERPL-CCNC: 22 U/L
ANION GAP SERPL CALC-SCNC: 13 MMOL/L
AST SERPL-CCNC: 68 U/L
BILIRUB SERPL-MCNC: 0.5 MG/DL
BUN SERPL-MCNC: 14 MG/DL
CALCIUM SERPL-MCNC: 9 MG/DL
CANCER AG27-29 SERPL-ACNC: 41.5 U/ML
CEA SERPL-MCNC: 7.3 NG/ML
CHLORIDE SERPL-SCNC: 102 MMOL/L
CO2 SERPL-SCNC: 25 MMOL/L
CREAT SERPL-MCNC: 0.8 MG/DL
EGFR: 80 ML/MIN/1.73M2
GLUCOSE SERPL-MCNC: 108 MG/DL
POTASSIUM SERPL-SCNC: 4.4 MMOL/L
PROT SERPL-MCNC: 6.2 G/DL
SODIUM SERPL-SCNC: 140 MMOL/L

## 2024-09-18 NOTE — HISTORY OF PRESENT ILLNESS
[100: Normal, no complaints, no evidence of disease.] : 100: Normal, no complaints, no evidence of disease. [de-identified] : The patient first noticed something in her left breast in .  She underwent an excisional biopsy and reports that it was a "benign cyst."  The patient reports that beginning several years ago, she noticed "something" in the exact place where the prior cyst had been, and thought this was perhaps another cyst.  She chose to follow it.  Approximately 1-2 years ago, this caused skin breakdown and the patient reports she thought it was an abscess and was applying antibiotics on it.  In  she presented to her psychiatry nurse practitioner and showed her the left breast lesion, was told this was breast cancer.  She consequently presented to a surgical physician at [facility].  That physician apparently performed a chest x-ray which was reportedly normal, an EKG which was normal, and a mammogram and sonogram (the results of which I do not have).  The patient was offered to have an excisional biopsy the following day, which she did on 2022 at [facility] in [place].  The outside pathology report shows invasive duct carcinoma, poorly differentiated, Kalani score 8, measuring 4 cm, with angiolymphatic space invasion present, and carcinoma extending to the epidermis associated with ulceration, as well as to the peripheral and deep margins, estrogen receptor returned positive, progesterone receptor positive, and HER-2/samson negative (0), and Ki-67 was 8%.  She required wound care post-operatively due to very poor healing  She was seen at  [facility] (formerly [facility])  by Dr. Silver Fitzgerald in consultation regarding further treatment recommendations.   The patient reports that her  was the  on  at the [facility] site and subsequently developed pancreatic cancer, with the patient reporting he  a horrible death and his treatment team treated him very very poorly.  Subsequently her son also developed an unspecified illness and presented for medical care, and was sent out by the team reportedly thinking he was drug-seeking, and subsequently he suffered a severe medical illness; he then became an alcoholic and  of unspecified causes soon afterwards.    She reports that she has a generalized anxiety disorder for which she is followed by a nurse practitioner who writes prescriptions as well as a /therapist.  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  She reports that is why she has not seen a physician or sought medical care for over 10 years and has not had a mammogram for at least that many years.  The patient had PET/CT in 2022 which showed metastatic disease of the bones.  The patient was started on Anastrozole and palbociclib on 22. She travels back and forth from [place] for routine follow-up visits.  She had [employer] testing done on 24 that showed no actionable mutations.   [de-identified] : 9/17/24 Patient returns today to rule out progression or recurrence of breast cancer and to assess treatment toxicity.  Found on PET to have POD in L breast and L axillary node, though no new lesions. Patient has significant anxiety about new progression. Tolerating venlafaxine, famotidine has helped with nausea. Energy good. Continue to work with Psych PA and therapist to help with her anxiety  PET/CT 8/20/24 1. Compared with PET/CT 1/9/2024, there has been mild increase in FDG avidity of a left axillary lymph node and the soft tissue mass in the left breast inner quadrant. There has been marked interval increase in FDG avidity of a smaller soft tissue mass in the left breast. These lesions are similar in size or mildly larger. This is concerning for progression of disease. 2. No new FDG avid lesions attributable to breast cancer. 3. New FDG avid cutaneous lesion on the left lateral upper back. Recommend clinical examination for signs of infection or neoplasm.  PET/CT 1/9/2024 1. When compared with PET/CT 7/27/2023, the larger left upper inner quadrant breast mass is stable in size and shows continued decrease in FDG avidity. There is mild increase in size and FDG avidity of a smaller soft tissue mass in the left breast, slowly increasing in FDG avidity compared to the prior two PET scans, concerning for progression at this site. 2. Similar size and mildly increased FDG avidity of a left axillary lymph node. 3. Stable appearance of bone lesions, with stable low-level or no FDG avidity.   PET/CT, 7/27/23- IMPRESSION: When compared with prior FDG-PET/CT dated 1/12/2023: 1. Continued decrease in metabolic activity within the larger left upper inner quadrant breast mass. Increased FDG avidity within the smaller inferolateral left breast mass which is unchanged in size. 2. Stable to decreased avidity of left axillary/intramammary lymph nodes. 3. Similar appearing sclerosis of the bony lesions, with mild or no FDG avidity which is decreased in metabolism, compatible with response to interval therapy. 4. No new lesion.  PET/CT, 1/12/23- IMPRESSION: Marked improvement in FDG avid lesions since the prior PET/CT in 7/2022: 1. Improvement in the hypermetabolic left breast masses and left axillary/internal mammary nodes as detailed above. 2. More sclerosis and less intense activity in FDG avid bone lesions indicative of bone remodeling as evidence of healing.  PET/CT scan, 7/11/22- IMPRESSION: Abnormal FDG-PET/CT scan. 1. FDG-avid left breast masses, medial aspect upper left breast, and central aspect left breast, are compatible with malignancy. The mass in the medial aspect of the upper left breast involves the overlying skin and is inseparable from the chest wall musculature. The mass in the central left breast is not well delineated on CT. Further evaluation may be performed with MRI. 2. FDG-avid left axillary lymph node metastases. 3. FDG-avid mixed lytic and sclerotic bone metastases in axial skeleton and proximal right femur. 4. Large hiatal hernia with distended esophagus, without associated hypermetabolism. Please correlate clinically.

## 2024-09-18 NOTE — ASSESSMENT
[FreeTextEntry1] : 70 yo woman with metastatic left breast cancer; ER+ 80-85%; MS+ 65-75%; HER2 low (IHC 1+), initial diagnosis in 7/2022 with evidence of bone mets.  -POD noted on most recent PET/CT in 8/2024 while on Palbociclib/anastrozole.  - based on data from the MAINTAIN trial which showed improved PFS with change of CK4/6 inhibitor to ribociclib continued with aromatase inhibitor, will plan to change from palbociclib to ribociclib - risks/benefits/side effects including but not limited to myelosuppression, fatigue and QT prolongation discussed -CBC okay to switch to ribociclib -EKG from [facility] in 8/2024 reviewed and QT noted to be stable at 440ms;  - will send script to pharmacy for ribociclib; will also continue anastrozole for now -reviewed labs with patient, including CEA and Ca 27.29, stable.   Ongoing anxiety about health -Continues to work with a Psych PA and therapist -referred to virtual support group  -Patient had the opportunity to have all her questions answered to her satisfaction.  Emotional support provided. -follow up in 1 month as scheduled. (i.e. 2 weeks after initiating ribicoclib to have repeat EKG)

## 2024-09-18 NOTE — HISTORY OF PRESENT ILLNESS
[100: Normal, no complaints, no evidence of disease.] : 100: Normal, no complaints, no evidence of disease. [de-identified] : The patient first noticed something in her left breast in .  She underwent an excisional biopsy and reports that it was a "benign cyst."  The patient reports that beginning several years ago, she noticed "something" in the exact place where the prior cyst had been, and thought this was perhaps another cyst.  She chose to follow it.  Approximately 1-2 years ago, this caused skin breakdown and the patient reports she thought it was an abscess and was applying antibiotics on it.  In  she presented to her psychiatry nurse practitioner and showed her the left breast lesion, was told this was breast cancer.  She consequently presented to a surgical physician at [facility].  That physician apparently performed a chest x-ray which was reportedly normal, an EKG which was normal, and a mammogram and sonogram (the results of which I do not have).  The patient was offered to have an excisional biopsy the following day, which she did on 2022 at [facility] in [place].  The outside pathology report shows invasive duct carcinoma, poorly differentiated, Kalani score 8, measuring 4 cm, with angiolymphatic space invasion present, and carcinoma extending to the epidermis associated with ulceration, as well as to the peripheral and deep margins, estrogen receptor returned positive, progesterone receptor positive, and HER-2/samson negative (0), and Ki-67 was 8%.  She required wound care post-operatively due to very poor healing  She was seen at  [facility] (formerly [facility])  by Dr. Silver Fitzgerald in consultation regarding further treatment recommendations.   The patient reports that her  was the  on  at the [facility] site and subsequently developed pancreatic cancer, with the patient reporting he  a horrible death and his treatment team treated him very very poorly.  Subsequently her son also developed an unspecified illness and presented for medical care, and was sent out by the team reportedly thinking he was drug-seeking, and subsequently he suffered a severe medical illness; he then became an alcoholic and  of unspecified causes soon afterwards.    She reports that she has a generalized anxiety disorder for which she is followed by a nurse practitioner who writes prescriptions as well as a /therapist.  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  She reports that is why she has not seen a physician or sought medical care for over 10 years and has not had a mammogram for at least that many years.  The patient had PET/CT in 2022 which showed metastatic disease of the bones.  The patient was started on Anastrozole and palbociclib on 22. She travels back and forth from [place] for routine follow-up visits.  She had [employer] testing done on 24 that showed no actionable mutations.   [de-identified] : 9/17/24 Patient returns today to rule out progression or recurrence of breast cancer and to assess treatment toxicity.  Found on PET to have POD in L breast and L axillary node, though no new lesions. Patient has significant anxiety about new progression. Tolerating venlafaxine, famotidine has helped with nausea. Energy good. Continue to work with Psych PA and therapist to help with her anxiety  PET/CT 8/20/24 1. Compared with PET/CT 1/9/2024, there has been mild increase in FDG avidity of a left axillary lymph node and the soft tissue mass in the left breast inner quadrant. There has been marked interval increase in FDG avidity of a smaller soft tissue mass in the left breast. These lesions are similar in size or mildly larger. This is concerning for progression of disease. 2. No new FDG avid lesions attributable to breast cancer. 3. New FDG avid cutaneous lesion on the left lateral upper back. Recommend clinical examination for signs of infection or neoplasm.  PET/CT 1/9/2024 1. When compared with PET/CT 7/27/2023, the larger left upper inner quadrant breast mass is stable in size and shows continued decrease in FDG avidity. There is mild increase in size and FDG avidity of a smaller soft tissue mass in the left breast, slowly increasing in FDG avidity compared to the prior two PET scans, concerning for progression at this site. 2. Similar size and mildly increased FDG avidity of a left axillary lymph node. 3. Stable appearance of bone lesions, with stable low-level or no FDG avidity.   PET/CT, 7/27/23- IMPRESSION: When compared with prior FDG-PET/CT dated 1/12/2023: 1. Continued decrease in metabolic activity within the larger left upper inner quadrant breast mass. Increased FDG avidity within the smaller inferolateral left breast mass which is unchanged in size. 2. Stable to decreased avidity of left axillary/intramammary lymph nodes. 3. Similar appearing sclerosis of the bony lesions, with mild or no FDG avidity which is decreased in metabolism, compatible with response to interval therapy. 4. No new lesion.  PET/CT, 1/12/23- IMPRESSION: Marked improvement in FDG avid lesions since the prior PET/CT in 7/2022: 1. Improvement in the hypermetabolic left breast masses and left axillary/internal mammary nodes as detailed above. 2. More sclerosis and less intense activity in FDG avid bone lesions indicative of bone remodeling as evidence of healing.  PET/CT scan, 7/11/22- IMPRESSION: Abnormal FDG-PET/CT scan. 1. FDG-avid left breast masses, medial aspect upper left breast, and central aspect left breast, are compatible with malignancy. The mass in the medial aspect of the upper left breast involves the overlying skin and is inseparable from the chest wall musculature. The mass in the central left breast is not well delineated on CT. Further evaluation may be performed with MRI. 2. FDG-avid left axillary lymph node metastases. 3. FDG-avid mixed lytic and sclerotic bone metastases in axial skeleton and proximal right femur. 4. Large hiatal hernia with distended esophagus, without associated hypermetabolism. Please correlate clinically.

## 2024-09-18 NOTE — ASSESSMENT
[FreeTextEntry1] : 68 yo woman with metastatic left breast cancer; ER+ 80-85%; TN+ 65-75%; HER2 low (IHC 1+), initial diagnosis in 7/2022 with evidence of bone mets.  -POD noted on most recent PET/CT in 8/2024 while on Palbociclib/anastrozole.  - based on data from the MAINTAIN trial which showed improved PFS with change of CK4/6 inhibitor to ribociclib continued with aromatase inhibitor, will plan to change from palbociclib to ribociclib - risks/benefits/side effects including but not limited to myelosuppression, fatigue and QT prolongation discussed -CBC okay to switch to ribociclib -EKG from [facility] in 8/2024 reviewed and QT noted to be stable at 440ms;  - will send script to pharmacy for ribociclib; will also continue anastrozole for now -reviewed labs with patient, including CEA and Ca 27.29, stable.   Ongoing anxiety about health -Continues to work with a Psych PA and therapist -referred to virtual support group  -Patient had the opportunity to have all her questions answered to her satisfaction.  Emotional support provided. -follow up in 1 month as scheduled. (i.e. 2 weeks after initiating ribicoclib to have repeat EKG)

## 2024-09-18 NOTE — PHYSICAL EXAM
[Fully active, able to carry on all pre-disease performance without restriction] : Status 0 - Fully active, able to carry on all pre-disease performance without restriction [Normal] : affect appropriate [de-identified] : Left breast wound site closed with good granulation tissue, with stable nodularity at the upper end w/o change; measures 5X5cm; no other underlying palpable masses.  No plapable axillary nodes. Right breast with no discrete palpable mass, no palpable axillary nodes. [de-identified] : tearful at times but appropriate; tangential thought at times

## 2024-09-18 NOTE — END OF VISIT
[Time Spent: ___ minutes] : I have spent [unfilled] minutes of time on the encounter which excludes teaching and separately reported services. [] : Fellow [FreeTextEntry3] : Patient seen and examined with heme/Onc fellow (Dr. Jhon Rehman - PGY4)

## 2024-09-18 NOTE — PHYSICAL EXAM
[Fully active, able to carry on all pre-disease performance without restriction] : Status 0 - Fully active, able to carry on all pre-disease performance without restriction [Normal] : affect appropriate [de-identified] : Left breast wound site closed with good granulation tissue, with stable nodularity at the upper end w/o change; measures 5X5cm; no other underlying palpable masses.  No plapable axillary nodes. Right breast with no discrete palpable mass, no palpable axillary nodes. [de-identified] : tearful at times but appropriate; tangential thought at times

## 2024-09-19 ENCOUNTER — TRANSCRIPTION ENCOUNTER (OUTPATIENT)
Age: 69
End: 2024-09-19

## 2024-09-23 RX ORDER — RIBOCICLIB 200 MG/1
200 TABLET, FILM COATED ORAL
Qty: 1 | Refills: 3 | Status: ACTIVE | COMMUNITY
Start: 2024-09-17 | End: 1900-01-01

## 2024-09-27 ENCOUNTER — NON-APPOINTMENT (OUTPATIENT)
Age: 69
End: 2024-09-27

## 2024-10-18 ENCOUNTER — RESULT REVIEW (OUTPATIENT)
Age: 69
End: 2024-10-18

## 2024-10-18 ENCOUNTER — APPOINTMENT (OUTPATIENT)
Dept: HEMATOLOGY ONCOLOGY | Facility: CLINIC | Age: 69
End: 2024-10-18
Payer: MEDICARE

## 2024-10-18 VITALS
HEART RATE: 107 BPM | WEIGHT: 157.87 LBS | DIASTOLIC BLOOD PRESSURE: 78 MMHG | BODY MASS INDEX: 27.12 KG/M2 | RESPIRATION RATE: 18 BRPM | SYSTOLIC BLOOD PRESSURE: 132 MMHG | TEMPERATURE: 96.6 F | OXYGEN SATURATION: 95 %

## 2024-10-18 DIAGNOSIS — Z79.811 LONG TERM (CURRENT) USE OF AROMATASE INHIBITORS: ICD-10-CM

## 2024-10-18 DIAGNOSIS — Z79.899 OTHER LONG TERM (CURRENT) DRUG THERAPY: ICD-10-CM

## 2024-10-18 DIAGNOSIS — C50.919 MALIGNANT NEOPLASM OF UNSPECIFIED SITE OF UNSPECIFIED FEMALE BREAST: ICD-10-CM

## 2024-10-18 DIAGNOSIS — R45.89 OTHER SYMPTOMS AND SIGNS INVOLVING EMOTIONAL STATE: ICD-10-CM

## 2024-10-18 LAB
ANISOCYTOSIS BLD QL: SLIGHT — SIGNIFICANT CHANGE UP
BASOPHILS # BLD AUTO: 0 K/UL — SIGNIFICANT CHANGE UP (ref 0–0.2)
BASOPHILS NFR BLD AUTO: 0 % — SIGNIFICANT CHANGE UP (ref 0–2)
DACRYOCYTES BLD QL SMEAR: SLIGHT — SIGNIFICANT CHANGE UP
ELLIPTOCYTES BLD QL SMEAR: SLIGHT — SIGNIFICANT CHANGE UP
EOSINOPHIL # BLD AUTO: 0.05 K/UL — SIGNIFICANT CHANGE UP (ref 0–0.5)
EOSINOPHIL NFR BLD AUTO: 2 % — SIGNIFICANT CHANGE UP (ref 0–6)
HCT VFR BLD CALC: 37.9 % — SIGNIFICANT CHANGE UP (ref 34.5–45)
HGB BLD-MCNC: 12.5 G/DL — SIGNIFICANT CHANGE UP (ref 11.5–15.5)
LYMPHOCYTES # BLD AUTO: 0.53 K/UL — LOW (ref 1–3.3)
LYMPHOCYTES # BLD AUTO: 20 % — SIGNIFICANT CHANGE UP (ref 13–44)
MACROCYTES BLD QL: SLIGHT — SIGNIFICANT CHANGE UP
MCHC RBC-ENTMCNC: 33 G/DL — SIGNIFICANT CHANGE UP (ref 32–36)
MCHC RBC-ENTMCNC: 34.2 PG — HIGH (ref 27–34)
MCV RBC AUTO: 103.8 FL — HIGH (ref 80–100)
MONOCYTES # BLD AUTO: 0.11 K/UL — SIGNIFICANT CHANGE UP (ref 0–0.9)
MONOCYTES NFR BLD AUTO: 4 % — SIGNIFICANT CHANGE UP (ref 2–14)
NEUTROPHILS # BLD AUTO: 1.95 K/UL — SIGNIFICANT CHANGE UP (ref 1.8–7.4)
NEUTROPHILS NFR BLD AUTO: 73 % — SIGNIFICANT CHANGE UP (ref 43–77)
NRBC # BLD: 0 /100 WBCS — SIGNIFICANT CHANGE UP (ref 0–0)
NRBC # BLD: SIGNIFICANT CHANGE UP /100 WBCS (ref 0–0)
PLAT MORPH BLD: NORMAL — SIGNIFICANT CHANGE UP
PLATELET # BLD AUTO: 153 K/UL — SIGNIFICANT CHANGE UP (ref 150–400)
POIKILOCYTOSIS BLD QL AUTO: SLIGHT — SIGNIFICANT CHANGE UP
RBC # BLD: 3.65 M/UL — LOW (ref 3.8–5.2)
RBC # FLD: 14.1 % — SIGNIFICANT CHANGE UP (ref 10.3–14.5)
RBC BLD AUTO: ABNORMAL
VARIANT LYMPHS # BLD: 1 % — SIGNIFICANT CHANGE UP (ref 0–6)
WBC # BLD: 2.67 K/UL — LOW (ref 3.8–10.5)
WBC # FLD AUTO: 2.67 K/UL — LOW (ref 3.8–10.5)

## 2024-10-18 PROCEDURE — 93010 ELECTROCARDIOGRAM REPORT: CPT

## 2024-10-18 PROCEDURE — 99214 OFFICE O/P EST MOD 30 MIN: CPT

## 2024-10-18 PROCEDURE — G2211 COMPLEX E/M VISIT ADD ON: CPT

## 2024-10-18 RX ORDER — ONDANSETRON HYDROCHLORIDE 24 MG/1
TABLET, FILM COATED ORAL
Refills: 0 | Status: ACTIVE | COMMUNITY

## 2024-10-21 LAB
ALBUMIN SERPL ELPH-MCNC: 3.9 G/DL
ALP BLD-CCNC: 90 U/L
ALT SERPL-CCNC: 11 U/L
ANION GAP SERPL CALC-SCNC: 15 MMOL/L
AST SERPL-CCNC: 10 U/L
BILIRUB SERPL-MCNC: 0.7 MG/DL
BUN SERPL-MCNC: 12 MG/DL
CALCIUM SERPL-MCNC: 9.1 MG/DL
CANCER AG27-29 SERPL-ACNC: 37.9 U/ML
CEA SERPL-MCNC: 8.3 NG/ML
CHLORIDE SERPL-SCNC: 102 MMOL/L
CO2 SERPL-SCNC: 25 MMOL/L
CREAT SERPL-MCNC: 0.96 MG/DL
EGFR: 64 ML/MIN/1.73M2
GLUCOSE SERPL-MCNC: 124 MG/DL
POTASSIUM SERPL-SCNC: 4 MMOL/L
PROT SERPL-MCNC: 6 G/DL
SODIUM SERPL-SCNC: 141 MMOL/L

## 2024-11-14 ENCOUNTER — OUTPATIENT (OUTPATIENT)
Dept: OUTPATIENT SERVICES | Facility: HOSPITAL | Age: 69
LOS: 1 days | Discharge: HOME | End: 2024-11-14

## 2024-11-14 DIAGNOSIS — C50.919 MALIGNANT NEOPLASM OF UNSPECIFIED SITE OF UNSPECIFIED FEMALE BREAST: ICD-10-CM

## 2024-11-15 RX ORDER — PROCHLORPERAZINE MALEATE 10 MG/1
10 TABLET ORAL EVERY 6 HOURS
Qty: 30 | Refills: 1 | Status: ACTIVE | COMMUNITY
Start: 2024-11-15 | End: 1900-01-01

## 2024-11-19 ENCOUNTER — APPOINTMENT (OUTPATIENT)
Dept: HEMATOLOGY ONCOLOGY | Facility: CLINIC | Age: 69
End: 2024-11-19
Payer: MEDICARE

## 2024-11-19 ENCOUNTER — RESULT REVIEW (OUTPATIENT)
Age: 69
End: 2024-11-19

## 2024-11-19 ENCOUNTER — APPOINTMENT (OUTPATIENT)
Dept: HEMATOLOGY ONCOLOGY | Facility: CLINIC | Age: 69
End: 2024-11-19

## 2024-11-19 VITALS
HEART RATE: 97 BPM | DIASTOLIC BLOOD PRESSURE: 85 MMHG | RESPIRATION RATE: 16 BRPM | WEIGHT: 157.63 LBS | OXYGEN SATURATION: 97 % | SYSTOLIC BLOOD PRESSURE: 129 MMHG | BODY MASS INDEX: 27.08 KG/M2 | TEMPERATURE: 97.6 F

## 2024-11-19 DIAGNOSIS — Z79.899 OTHER LONG TERM (CURRENT) DRUG THERAPY: ICD-10-CM

## 2024-11-19 DIAGNOSIS — C50.912 MALIGNANT NEOPLASM OF UNSPECIFIED SITE OF LEFT FEMALE BREAST: ICD-10-CM

## 2024-11-19 DIAGNOSIS — Z17.0 MALIGNANT NEOPLASM OF UNSPECIFIED SITE OF LEFT FEMALE BREAST: ICD-10-CM

## 2024-11-19 DIAGNOSIS — Z79.811 LONG TERM (CURRENT) USE OF AROMATASE INHIBITORS: ICD-10-CM

## 2024-11-19 DIAGNOSIS — C50.919 MALIGNANT NEOPLASM OF UNSPECIFIED SITE OF UNSPECIFIED FEMALE BREAST: ICD-10-CM

## 2024-11-19 LAB
ALBUMIN SERPL ELPH-MCNC: 4.1 G/DL
ALP BLD-CCNC: 95 U/L
ALT SERPL-CCNC: 15 U/L
ANION GAP SERPL CALC-SCNC: 12 MMOL/L
AST SERPL-CCNC: 16 U/L
BASOPHILS # BLD AUTO: 0.05 K/UL — SIGNIFICANT CHANGE UP (ref 0–0.2)
BASOPHILS NFR BLD AUTO: 1.4 % — SIGNIFICANT CHANGE UP (ref 0–2)
BILIRUB SERPL-MCNC: 0.5 MG/DL
BUN SERPL-MCNC: 15 MG/DL
CALCIUM SERPL-MCNC: 9.3 MG/DL
CEA SERPL-MCNC: 9.5 NG/ML
CHLORIDE SERPL-SCNC: 104 MMOL/L
CO2 SERPL-SCNC: 24 MMOL/L
CREAT SERPL-MCNC: 1 MG/DL
EGFR: 61 ML/MIN/1.73M2
EOSINOPHIL # BLD AUTO: 0.04 K/UL — SIGNIFICANT CHANGE UP (ref 0–0.5)
EOSINOPHIL NFR BLD AUTO: 1.1 % — SIGNIFICANT CHANGE UP (ref 0–6)
GLUCOSE SERPL-MCNC: 154 MG/DL
HCT VFR BLD CALC: 39.8 % — SIGNIFICANT CHANGE UP (ref 34.5–45)
HGB BLD-MCNC: 12.9 G/DL — SIGNIFICANT CHANGE UP (ref 11.5–15.5)
IMM GRANULOCYTES NFR BLD AUTO: 0.6 % — SIGNIFICANT CHANGE UP (ref 0–0.9)
LYMPHOCYTES # BLD AUTO: 0.85 K/UL — LOW (ref 1–3.3)
LYMPHOCYTES # BLD AUTO: 23.7 % — SIGNIFICANT CHANGE UP (ref 13–44)
MCHC RBC-ENTMCNC: 32.4 G/DL — SIGNIFICANT CHANGE UP (ref 32–36)
MCHC RBC-ENTMCNC: 33.2 PG — SIGNIFICANT CHANGE UP (ref 27–34)
MCV RBC AUTO: 102.3 FL — HIGH (ref 80–100)
MONOCYTES # BLD AUTO: 0.22 K/UL — SIGNIFICANT CHANGE UP (ref 0–0.9)
MONOCYTES NFR BLD AUTO: 6.1 % — SIGNIFICANT CHANGE UP (ref 2–14)
NEUTROPHILS # BLD AUTO: 2.41 K/UL — SIGNIFICANT CHANGE UP (ref 1.8–7.4)
NEUTROPHILS NFR BLD AUTO: 67.1 % — SIGNIFICANT CHANGE UP (ref 43–77)
NRBC # BLD: 0 /100 WBCS — SIGNIFICANT CHANGE UP (ref 0–0)
PLATELET # BLD AUTO: 236 K/UL — SIGNIFICANT CHANGE UP (ref 150–400)
POTASSIUM SERPL-SCNC: 4.3 MMOL/L
PROT SERPL-MCNC: 6.4 G/DL
RBC # BLD: 3.89 M/UL — SIGNIFICANT CHANGE UP (ref 3.8–5.2)
RBC # FLD: 15.6 % — HIGH (ref 10.3–14.5)
SODIUM SERPL-SCNC: 140 MMOL/L
WBC # BLD: 3.59 K/UL — LOW (ref 3.8–10.5)
WBC # FLD AUTO: 3.59 K/UL — LOW (ref 3.8–10.5)

## 2024-11-19 PROCEDURE — G2211 COMPLEX E/M VISIT ADD ON: CPT

## 2024-11-19 PROCEDURE — 99214 OFFICE O/P EST MOD 30 MIN: CPT

## 2024-11-20 LAB — CANCER AG27-29 SERPL-ACNC: 42.5 U/ML

## 2024-11-21 ENCOUNTER — NON-APPOINTMENT (OUTPATIENT)
Age: 69
End: 2024-11-21

## 2024-12-11 ENCOUNTER — RX RENEWAL (OUTPATIENT)
Age: 69
End: 2024-12-11

## 2024-12-16 ENCOUNTER — RESULT REVIEW (OUTPATIENT)
Age: 69
End: 2024-12-16

## 2024-12-16 ENCOUNTER — APPOINTMENT (OUTPATIENT)
Dept: HEMATOLOGY ONCOLOGY | Facility: CLINIC | Age: 69
End: 2024-12-16
Payer: MEDICARE

## 2024-12-16 ENCOUNTER — APPOINTMENT (OUTPATIENT)
Dept: HEMATOLOGY ONCOLOGY | Facility: CLINIC | Age: 69
End: 2024-12-16

## 2024-12-16 VITALS
WEIGHT: 159.83 LBS | SYSTOLIC BLOOD PRESSURE: 155 MMHG | TEMPERATURE: 96.6 F | DIASTOLIC BLOOD PRESSURE: 86 MMHG | RESPIRATION RATE: 16 BRPM | OXYGEN SATURATION: 98 % | HEART RATE: 98 BPM | BODY MASS INDEX: 27.46 KG/M2

## 2024-12-16 DIAGNOSIS — C50.919 MALIGNANT NEOPLASM OF UNSPECIFIED SITE OF UNSPECIFIED FEMALE BREAST: ICD-10-CM

## 2024-12-16 LAB
ALBUMIN SERPL ELPH-MCNC: 4.5 G/DL
ALP BLD-CCNC: 88 U/L
ALT SERPL-CCNC: 20 U/L
ANION GAP SERPL CALC-SCNC: 11 MMOL/L
AST SERPL-CCNC: 19 U/L
BASOPHILS # BLD AUTO: 0.04 K/UL — SIGNIFICANT CHANGE UP (ref 0–0.2)
BASOPHILS NFR BLD AUTO: 1.2 % — SIGNIFICANT CHANGE UP (ref 0–2)
BILIRUB SERPL-MCNC: 0.3 MG/DL
BUN SERPL-MCNC: 12 MG/DL
CALCIUM SERPL-MCNC: 9.6 MG/DL
CEA SERPL-MCNC: 8.1 NG/ML
CHLORIDE SERPL-SCNC: 103 MMOL/L
CO2 SERPL-SCNC: 26 MMOL/L
CREAT SERPL-MCNC: 0.81 MG/DL
EGFR: 79 ML/MIN/1.73M2
EOSINOPHIL # BLD AUTO: 0.01 K/UL — SIGNIFICANT CHANGE UP (ref 0–0.5)
EOSINOPHIL NFR BLD AUTO: 0.3 % — SIGNIFICANT CHANGE UP (ref 0–6)
GLUCOSE SERPL-MCNC: 106 MG/DL
HCT VFR BLD CALC: 39.8 % — SIGNIFICANT CHANGE UP (ref 34.5–45)
HGB BLD-MCNC: 13.2 G/DL — SIGNIFICANT CHANGE UP (ref 11.5–15.5)
IMM GRANULOCYTES NFR BLD AUTO: 0.3 % — SIGNIFICANT CHANGE UP (ref 0–0.9)
LYMPHOCYTES # BLD AUTO: 0.86 K/UL — LOW (ref 1–3.3)
LYMPHOCYTES # BLD AUTO: 24.9 % — SIGNIFICANT CHANGE UP (ref 13–44)
MCHC RBC-ENTMCNC: 33.2 G/DL — SIGNIFICANT CHANGE UP (ref 32–36)
MCHC RBC-ENTMCNC: 33.8 PG — SIGNIFICANT CHANGE UP (ref 27–34)
MCV RBC AUTO: 101.8 FL — HIGH (ref 80–100)
MONOCYTES # BLD AUTO: 0.46 K/UL — SIGNIFICANT CHANGE UP (ref 0–0.9)
MONOCYTES NFR BLD AUTO: 13.3 % — SIGNIFICANT CHANGE UP (ref 2–14)
NEUTROPHILS # BLD AUTO: 2.07 K/UL — SIGNIFICANT CHANGE UP (ref 1.8–7.4)
NEUTROPHILS NFR BLD AUTO: 60 % — SIGNIFICANT CHANGE UP (ref 43–77)
NRBC # BLD: 0 /100 WBCS — SIGNIFICANT CHANGE UP (ref 0–0)
PLATELET # BLD AUTO: 224 K/UL — SIGNIFICANT CHANGE UP (ref 150–400)
POTASSIUM SERPL-SCNC: 4.8 MMOL/L
PROT SERPL-MCNC: 6.8 G/DL
RBC # BLD: 3.91 M/UL — SIGNIFICANT CHANGE UP (ref 3.8–5.2)
RBC # FLD: 16.9 % — HIGH (ref 10.3–14.5)
SODIUM SERPL-SCNC: 140 MMOL/L
WBC # BLD: 3.45 K/UL — LOW (ref 3.8–10.5)
WBC # FLD AUTO: 3.45 K/UL — LOW (ref 3.8–10.5)

## 2024-12-16 PROCEDURE — 99214 OFFICE O/P EST MOD 30 MIN: CPT

## 2024-12-16 PROCEDURE — G2211 COMPLEX E/M VISIT ADD ON: CPT

## 2024-12-17 LAB — CANCER AG27-29 SERPL-ACNC: 47 U/ML

## 2025-01-13 ENCOUNTER — OUTPATIENT (OUTPATIENT)
Dept: OUTPATIENT SERVICES | Facility: HOSPITAL | Age: 70
LOS: 1 days | Discharge: HOME | End: 2025-01-13

## 2025-01-13 DIAGNOSIS — C50.919 MALIGNANT NEOPLASM OF UNSPECIFIED SITE OF UNSPECIFIED FEMALE BREAST: ICD-10-CM

## 2025-01-14 ENCOUNTER — RESULT REVIEW (OUTPATIENT)
Age: 70
End: 2025-01-14

## 2025-01-14 ENCOUNTER — APPOINTMENT (OUTPATIENT)
Dept: NUCLEAR MEDICINE | Facility: IMAGING CENTER | Age: 70
End: 2025-01-14
Payer: MEDICARE

## 2025-01-14 ENCOUNTER — NON-APPOINTMENT (OUTPATIENT)
Age: 70
End: 2025-01-14

## 2025-01-14 ENCOUNTER — OUTPATIENT (OUTPATIENT)
Dept: OUTPATIENT SERVICES | Facility: HOSPITAL | Age: 70
LOS: 1 days | End: 2025-01-14
Payer: MEDICARE

## 2025-01-14 ENCOUNTER — APPOINTMENT (OUTPATIENT)
Dept: HEMATOLOGY ONCOLOGY | Facility: CLINIC | Age: 70
End: 2025-01-14
Payer: MEDICARE

## 2025-01-14 VITALS
OXYGEN SATURATION: 98 % | WEIGHT: 157.63 LBS | SYSTOLIC BLOOD PRESSURE: 138 MMHG | TEMPERATURE: 97.2 F | HEART RATE: 97 BPM | RESPIRATION RATE: 16 BRPM | BODY MASS INDEX: 27.08 KG/M2 | DIASTOLIC BLOOD PRESSURE: 73 MMHG

## 2025-01-14 DIAGNOSIS — Z79.899 OTHER LONG TERM (CURRENT) DRUG THERAPY: ICD-10-CM

## 2025-01-14 DIAGNOSIS — C50.919 MALIGNANT NEOPLASM OF UNSPECIFIED SITE OF UNSPECIFIED FEMALE BREAST: ICD-10-CM

## 2025-01-14 DIAGNOSIS — Z79.811 LONG TERM (CURRENT) USE OF AROMATASE INHIBITORS: ICD-10-CM

## 2025-01-14 LAB
BASOPHILS # BLD AUTO: 0.06 K/UL — SIGNIFICANT CHANGE UP (ref 0–0.2)
BASOPHILS NFR BLD AUTO: 1.3 % — SIGNIFICANT CHANGE UP (ref 0–2)
CEA SERPL-MCNC: 7.5 NG/ML
EOSINOPHIL # BLD AUTO: 0 K/UL — SIGNIFICANT CHANGE UP (ref 0–0.5)
EOSINOPHIL NFR BLD AUTO: 0 % — SIGNIFICANT CHANGE UP (ref 0–6)
HCT VFR BLD CALC: 39.8 % — SIGNIFICANT CHANGE UP (ref 34.5–45)
HGB BLD-MCNC: 13.3 G/DL — SIGNIFICANT CHANGE UP (ref 11.5–15.5)
IMM GRANULOCYTES NFR BLD AUTO: 0.6 % — SIGNIFICANT CHANGE UP (ref 0–0.9)
LYMPHOCYTES # BLD AUTO: 1.08 K/UL — SIGNIFICANT CHANGE UP (ref 1–3.3)
LYMPHOCYTES # BLD AUTO: 23 % — SIGNIFICANT CHANGE UP (ref 13–44)
MCHC RBC-ENTMCNC: 33.4 G/DL — SIGNIFICANT CHANGE UP (ref 32–36)
MCHC RBC-ENTMCNC: 34.2 PG — HIGH (ref 27–34)
MCV RBC AUTO: 102.3 FL — HIGH (ref 80–100)
MONOCYTES # BLD AUTO: 0.65 K/UL — SIGNIFICANT CHANGE UP (ref 0–0.9)
MONOCYTES NFR BLD AUTO: 13.8 % — SIGNIFICANT CHANGE UP (ref 2–14)
NEUTROPHILS # BLD AUTO: 2.88 K/UL — SIGNIFICANT CHANGE UP (ref 1.8–7.4)
NEUTROPHILS NFR BLD AUTO: 61.3 % — SIGNIFICANT CHANGE UP (ref 43–77)
NRBC # BLD: 0 /100 WBCS — SIGNIFICANT CHANGE UP (ref 0–0)
NRBC BLD-RTO: 0 /100 WBCS — SIGNIFICANT CHANGE UP (ref 0–0)
PLATELET # BLD AUTO: 210 K/UL — SIGNIFICANT CHANGE UP (ref 150–400)
RBC # BLD: 3.89 M/UL — SIGNIFICANT CHANGE UP (ref 3.8–5.2)
RBC # FLD: 15.3 % — HIGH (ref 10.3–14.5)
WBC # BLD: 4.7 K/UL — SIGNIFICANT CHANGE UP (ref 3.8–10.5)
WBC # FLD AUTO: 4.7 K/UL — SIGNIFICANT CHANGE UP (ref 3.8–10.5)

## 2025-01-14 PROCEDURE — 78815 PET IMAGE W/CT SKULL-THIGH: CPT | Mod: 26,PS,KX

## 2025-01-14 PROCEDURE — 78815 PET IMAGE W/CT SKULL-THIGH: CPT

## 2025-01-14 PROCEDURE — 99214 OFFICE O/P EST MOD 30 MIN: CPT

## 2025-01-14 PROCEDURE — A9552: CPT

## 2025-01-15 LAB
ALBUMIN SERPL ELPH-MCNC: 4.7 G/DL
ALP BLD-CCNC: 89 U/L
ALT SERPL-CCNC: 14 U/L
ANION GAP SERPL CALC-SCNC: 14 MMOL/L
AST SERPL-CCNC: 19 U/L
BILIRUB SERPL-MCNC: 0.6 MG/DL
BUN SERPL-MCNC: 16 MG/DL
CALCIUM SERPL-MCNC: 9.1 MG/DL
CANCER AG27-29 SERPL-ACNC: 38.1 U/ML
CHLORIDE SERPL-SCNC: 100 MMOL/L
CO2 SERPL-SCNC: 24 MMOL/L
CREAT SERPL-MCNC: 0.94 MG/DL
EGFR: 66 ML/MIN/1.73M2
GLUCOSE SERPL-MCNC: 119 MG/DL
POTASSIUM SERPL-SCNC: 3.2 MMOL/L
PROT SERPL-MCNC: 6.7 G/DL
SODIUM SERPL-SCNC: 138 MMOL/L

## 2025-01-15 RX ORDER — POTASSIUM CHLORIDE 750 MG/1
10 TABLET, FILM COATED, EXTENDED RELEASE ORAL DAILY
Qty: 5 | Refills: 0 | Status: ACTIVE | COMMUNITY
Start: 2025-01-15 | End: 1900-01-01

## 2025-02-10 ENCOUNTER — TRANSCRIPTION ENCOUNTER (OUTPATIENT)
Age: 70
End: 2025-02-10

## 2025-02-18 ENCOUNTER — RESULT REVIEW (OUTPATIENT)
Age: 70
End: 2025-02-18

## 2025-02-18 ENCOUNTER — APPOINTMENT (OUTPATIENT)
Dept: HEMATOLOGY ONCOLOGY | Facility: CLINIC | Age: 70
End: 2025-02-18
Payer: MEDICARE

## 2025-02-18 ENCOUNTER — APPOINTMENT (OUTPATIENT)
Dept: HEMATOLOGY ONCOLOGY | Facility: CLINIC | Age: 70
End: 2025-02-18

## 2025-02-18 VITALS
OXYGEN SATURATION: 98 % | SYSTOLIC BLOOD PRESSURE: 124 MMHG | BODY MASS INDEX: 26.51 KG/M2 | WEIGHT: 154.32 LBS | TEMPERATURE: 97.4 F | DIASTOLIC BLOOD PRESSURE: 52 MMHG | HEART RATE: 97 BPM | RESPIRATION RATE: 16 BRPM

## 2025-02-18 DIAGNOSIS — C50.912 MALIGNANT NEOPLASM OF UNSPECIFIED SITE OF LEFT FEMALE BREAST: ICD-10-CM

## 2025-02-18 DIAGNOSIS — Z79.899 OTHER LONG TERM (CURRENT) DRUG THERAPY: ICD-10-CM

## 2025-02-18 DIAGNOSIS — R45.89 OTHER SYMPTOMS AND SIGNS INVOLVING EMOTIONAL STATE: ICD-10-CM

## 2025-02-18 DIAGNOSIS — Z79.811 LONG TERM (CURRENT) USE OF AROMATASE INHIBITORS: ICD-10-CM

## 2025-02-18 DIAGNOSIS — Z17.0 MALIGNANT NEOPLASM OF UNSPECIFIED SITE OF LEFT FEMALE BREAST: ICD-10-CM

## 2025-02-18 LAB
ALBUMIN SERPL ELPH-MCNC: 4.5 G/DL
ALP BLD-CCNC: 101 U/L
ALT SERPL-CCNC: 58 U/L
ANION GAP SERPL CALC-SCNC: 15 MMOL/L
AST SERPL-CCNC: 22 U/L
BASOPHILS # BLD AUTO: 0.07 K/UL — SIGNIFICANT CHANGE UP (ref 0–0.2)
BASOPHILS NFR BLD AUTO: 1.6 % — SIGNIFICANT CHANGE UP (ref 0–2)
BILIRUB SERPL-MCNC: 0.9 MG/DL
BUN SERPL-MCNC: 11 MG/DL
CALCIUM SERPL-MCNC: 9.9 MG/DL
CEA SERPL-MCNC: 7.7 NG/ML
CHLORIDE SERPL-SCNC: 105 MMOL/L
CO2 SERPL-SCNC: 24 MMOL/L
CREAT SERPL-MCNC: 0.9 MG/DL
EGFR: 70 ML/MIN/1.73M2
EOSINOPHIL # BLD AUTO: 0.01 K/UL — SIGNIFICANT CHANGE UP (ref 0–0.5)
EOSINOPHIL NFR BLD AUTO: 0.2 % — SIGNIFICANT CHANGE UP (ref 0–6)
GLUCOSE SERPL-MCNC: 117 MG/DL
HCT VFR BLD CALC: 41.3 % — SIGNIFICANT CHANGE UP (ref 34.5–45)
HGB BLD-MCNC: 13.7 G/DL — SIGNIFICANT CHANGE UP (ref 11.5–15.5)
IMM GRANULOCYTES NFR BLD AUTO: 0.5 % — SIGNIFICANT CHANGE UP (ref 0–0.9)
LYMPHOCYTES # BLD AUTO: 0.61 K/UL — LOW (ref 1–3.3)
LYMPHOCYTES # BLD AUTO: 13.8 % — SIGNIFICANT CHANGE UP (ref 13–44)
MCHC RBC-ENTMCNC: 33.2 G/DL — SIGNIFICANT CHANGE UP (ref 32–36)
MCHC RBC-ENTMCNC: 35.3 PG — HIGH (ref 27–34)
MCV RBC AUTO: 106.4 FL — HIGH (ref 80–100)
MONOCYTES # BLD AUTO: 0.19 K/UL — SIGNIFICANT CHANGE UP (ref 0–0.9)
MONOCYTES NFR BLD AUTO: 4.3 % — SIGNIFICANT CHANGE UP (ref 2–14)
NEUTROPHILS # BLD AUTO: 3.52 K/UL — SIGNIFICANT CHANGE UP (ref 1.8–7.4)
NEUTROPHILS NFR BLD AUTO: 79.6 % — HIGH (ref 43–77)
NRBC BLD AUTO-RTO: 0 /100 WBCS — SIGNIFICANT CHANGE UP (ref 0–0)
PLATELET # BLD AUTO: 290 K/UL — SIGNIFICANT CHANGE UP (ref 150–400)
POTASSIUM SERPL-SCNC: 4.8 MMOL/L
PROT SERPL-MCNC: 6.8 G/DL
RBC # BLD: 3.88 M/UL — SIGNIFICANT CHANGE UP (ref 3.8–5.2)
RBC # FLD: 13.5 % — SIGNIFICANT CHANGE UP (ref 10.3–14.5)
SODIUM SERPL-SCNC: 145 MMOL/L
WBC # BLD: 4.42 K/UL — SIGNIFICANT CHANGE UP (ref 3.8–10.5)
WBC # FLD AUTO: 4.42 K/UL — SIGNIFICANT CHANGE UP (ref 3.8–10.5)

## 2025-02-18 PROCEDURE — 99214 OFFICE O/P EST MOD 30 MIN: CPT

## 2025-02-18 PROCEDURE — G2211 COMPLEX E/M VISIT ADD ON: CPT

## 2025-02-20 LAB — CANCER AG27-29 SERPL-ACNC: 46.6 U/ML

## 2025-03-18 ENCOUNTER — RESULT REVIEW (OUTPATIENT)
Age: 70
End: 2025-03-18

## 2025-03-18 ENCOUNTER — APPOINTMENT (OUTPATIENT)
Dept: HEMATOLOGY ONCOLOGY | Facility: CLINIC | Age: 70
End: 2025-03-18

## 2025-03-18 ENCOUNTER — APPOINTMENT (OUTPATIENT)
Dept: HEMATOLOGY ONCOLOGY | Facility: CLINIC | Age: 70
End: 2025-03-18
Payer: MEDICARE

## 2025-03-18 VITALS
WEIGHT: 156.53 LBS | BODY MASS INDEX: 26.89 KG/M2 | RESPIRATION RATE: 16 BRPM | TEMPERATURE: 97 F | SYSTOLIC BLOOD PRESSURE: 148 MMHG | HEART RATE: 90 BPM | OXYGEN SATURATION: 97 % | DIASTOLIC BLOOD PRESSURE: 84 MMHG

## 2025-03-18 DIAGNOSIS — Z79.899 OTHER LONG TERM (CURRENT) DRUG THERAPY: ICD-10-CM

## 2025-03-18 DIAGNOSIS — R45.89 OTHER SYMPTOMS AND SIGNS INVOLVING EMOTIONAL STATE: ICD-10-CM

## 2025-03-18 DIAGNOSIS — Z79.811 LONG TERM (CURRENT) USE OF AROMATASE INHIBITORS: ICD-10-CM

## 2025-03-18 DIAGNOSIS — C50.919 MALIGNANT NEOPLASM OF UNSPECIFIED SITE OF UNSPECIFIED FEMALE BREAST: ICD-10-CM

## 2025-03-18 LAB — CEA SERPL-MCNC: 8.1 NG/ML

## 2025-03-18 PROCEDURE — 99214 OFFICE O/P EST MOD 30 MIN: CPT

## 2025-03-19 LAB — CANCER AG27-29 SERPL-ACNC: 41.6 U/ML

## 2025-03-26 LAB
ALBUMIN SERPL ELPH-MCNC: 4.4 G/DL
ALP BLD-CCNC: 100 U/L
ALT SERPL-CCNC: 96 U/L
ANION GAP SERPL CALC-SCNC: 16 MMOL/L
AST SERPL-CCNC: 19 U/L
BILIRUB SERPL-MCNC: 1 MG/DL
BUN SERPL-MCNC: 16 MG/DL
CALCIUM SERPL-MCNC: 9.7 MG/DL
CHLORIDE SERPL-SCNC: 105 MMOL/L
CO2 SERPL-SCNC: 25 MMOL/L
CREAT SERPL-MCNC: 0.89 MG/DL
EGFRCR SERPLBLD CKD-EPI 2021: 71 ML/MIN/1.73M2
GLUCOSE SERPL-MCNC: 128 MG/DL
POTASSIUM SERPL-SCNC: 4.5 MMOL/L
PROT SERPL-MCNC: 6.5 G/DL
SODIUM SERPL-SCNC: 146 MMOL/L

## 2025-04-08 ENCOUNTER — APPOINTMENT (OUTPATIENT)
Dept: HEMATOLOGY ONCOLOGY | Facility: CLINIC | Age: 70
End: 2025-04-08
Payer: MEDICARE

## 2025-04-08 ENCOUNTER — RX RENEWAL (OUTPATIENT)
Age: 70
End: 2025-04-08

## 2025-04-08 ENCOUNTER — RESULT REVIEW (OUTPATIENT)
Age: 70
End: 2025-04-08

## 2025-04-08 VITALS
OXYGEN SATURATION: 96 % | WEIGHT: 153 LBS | BODY MASS INDEX: 26.28 KG/M2 | DIASTOLIC BLOOD PRESSURE: 80 MMHG | SYSTOLIC BLOOD PRESSURE: 134 MMHG | RESPIRATION RATE: 16 BRPM | TEMPERATURE: 97.8 F | HEART RATE: 122 BPM

## 2025-04-08 DIAGNOSIS — C50.919 MALIGNANT NEOPLASM OF UNSPECIFIED SITE OF UNSPECIFIED FEMALE BREAST: ICD-10-CM

## 2025-04-08 PROCEDURE — G2211 COMPLEX E/M VISIT ADD ON: CPT

## 2025-04-08 PROCEDURE — 99214 OFFICE O/P EST MOD 30 MIN: CPT

## 2025-04-09 LAB
ALBUMIN SERPL ELPH-MCNC: 4.4 G/DL
ALP BLD-CCNC: 102 U/L
ALT SERPL-CCNC: 160 U/L
ANION GAP SERPL CALC-SCNC: 15 MMOL/L
AST SERPL-CCNC: 84 U/L
BILIRUB SERPL-MCNC: 0.6 MG/DL
BUN SERPL-MCNC: 10 MG/DL
CALCIUM SERPL-MCNC: 9.8 MG/DL
CANCER AG27-29 SERPL-ACNC: 44.9 U/ML
CEA SERPL-MCNC: 9.2 NG/ML
CHLORIDE SERPL-SCNC: 104 MMOL/L
CO2 SERPL-SCNC: 26 MMOL/L
CREAT SERPL-MCNC: 0.93 MG/DL
EGFRCR SERPLBLD CKD-EPI 2021: 67 ML/MIN/1.73M2
GLUCOSE SERPL-MCNC: 120 MG/DL
POTASSIUM SERPL-SCNC: 4.5 MMOL/L
PROT SERPL-MCNC: 6.7 G/DL
SODIUM SERPL-SCNC: 144 MMOL/L

## 2025-04-23 ENCOUNTER — TRANSCRIPTION ENCOUNTER (OUTPATIENT)
Age: 70
End: 2025-04-23

## 2025-04-29 NOTE — DATA REVIEWED
[FreeTextEntry1] : \par EXAM: [id number] - PETCT SKUL-THI ONC FDG INIT - ORDERED BY: AMITA GONSALVES\par PROCEDURE DATE: 07/11/2022\par INTERPRETATION: PROCEDURE: PET/CT SKULL BASE-MID THIGH IMAGING\par CLINICAL INFORMATION: 67-year-old female referred for evaluation of locally advanced left breast cancer, upper inner left breast (ER/SC positive) PET/CT is done as part of the initial treatment strategy evaluation.\par RADIOPHARMACEUTICAL: 12.93 mCi F-18, FDG, I.V.\par \par TECHNIQUE: Fasting blood sugar measured prior to injection of radiopharmaceutical was 82 mg/dl. Following intravenous injection of the radiopharmaceutical and an uptake period of approximately 70 minutes, FDG-PET/CT was obtained on a [employer] 710 from skull base to mid thigh. Oral contrast was given during the uptake period. CT was performed during shallow respiration. The CT protocol was optimized for PET attenuation correction and anatomic localization. The CT protocol was not designed to produce and cannot replace state-of-the-art diagnostic CT images with specific imaging protocols for different body parts and indications. Images were reviewed on a dedicated workstation using multiplanar reconstruction.\par \par The standardized uptake values (SUV) are normalized to patient body weight and indicate the highest activity concentration (SUVmax) in a given disease site. All image numbers refer to axial image number.\par \par COMPARISON: No prior PET/CT.\par OTHER STUDIES USED FOR CORRELATION: None available.\par \par FINDINGS:\par HEAD/NECK: Physiologic FDG activity in visualized brain, head, and neck.\par CHEST: There is a an FDG-avid mass in the medial aspect of the upper left breast which measures 4.5 x 3.5 cm, SUV 30.4 (image 87). The most intense portion of the mass is in the periphery, anteriorly. The mass involves the overlying skin, and is inseparable from the chest wall musculature. There is a separate, difficult to delineate FDG-avid mass in the central left breast (SUV 25.2; image 97).\par There are a few FDG-avid left axillary lymph nodes which measure up to 1.5 x 1.3 cm, SUV 18.1 (image 86).LUNGS: No abnormal FDG activity. No lung nodule.PLEURA/PERICARDIUM: No abnormal FDG activity. No effusion.HEPATOBILIARY/PANCREAS: Physiologic FDG activity. Liver SUV mean is 2.9.\par SPLEEN: Physiologic FDG activity. Normal in size.\par ADRENAL GLANDS: No abnormal FDG activity. No nodule.KIDNEYS/URINARY BLADDER: Physiologic excreted FDG activity.REPRODUCTIVE ORGANS: No abnormal FDG activity.\par ABDOMINOPELVIC NODES: No enlarged or FDG-avid lymph node.BOWEL/PERITONEUM/MESENTERY: No abnormal FDG activity. Large hiatal hernia with distended esophagus without associated hypermetabolism.\par BONES/SOFT TISSUES: Several FDG-avid mixed lytic and sclerotic lesions in the cervical, thoracic, and lumbar spine, bilateral pelvis, sternum, several bilateral ribs, and proximal right femur, including right intertrochanteric region and right subtrochanteric region. For reference, a lesion in the posterior right iliac bone measures 3.6 x 1.8 cm, SUV 13.3 (image 188). The cortex of the long bones is intact on CT.\par \par IMPRESSION: Abnormal FDG-PET/CT scan.\par 1. FDG-avid left breast masses, medial aspect upper left breast, and central aspect left breast, are compatible with malignancy. The mass in the medial aspect of the upper left breast involves the overlying skin and is inseparable from the chest wall musculature. The mass in the central left breast is not well delineated on CT. Further evaluation may be performed with MRI.\par 2. FDG-avid left axillary lymph node metastases.\par 3. FDG-avid mixed lytic nd sclerotic bone metastases in axial skeleton and proximal right femur.\par 4. Large hiatal hernia with distended esophagus, without associated hypermetabolism. Please correlate clinically.Findings emailed to Dr. Amita Gonsalves.\par \par --- End of Report ---\par \par PING REYNA MD; Attending Nuclear Medicine\par This document has been electronically signed. Jul 12 2022 1:44PM
No

## 2025-05-06 ENCOUNTER — RESULT REVIEW (OUTPATIENT)
Age: 70
End: 2025-05-06

## 2025-05-06 ENCOUNTER — APPOINTMENT (OUTPATIENT)
Dept: HEMATOLOGY ONCOLOGY | Facility: CLINIC | Age: 70
End: 2025-05-06
Payer: MEDICARE

## 2025-05-06 VITALS
TEMPERATURE: 97.5 F | SYSTOLIC BLOOD PRESSURE: 119 MMHG | DIASTOLIC BLOOD PRESSURE: 80 MMHG | OXYGEN SATURATION: 99 % | BODY MASS INDEX: 26.28 KG/M2 | WEIGHT: 153 LBS | HEART RATE: 105 BPM | RESPIRATION RATE: 16 BRPM

## 2025-05-06 DIAGNOSIS — Z79.899 OTHER LONG TERM (CURRENT) DRUG THERAPY: ICD-10-CM

## 2025-05-06 DIAGNOSIS — R45.89 OTHER SYMPTOMS AND SIGNS INVOLVING EMOTIONAL STATE: ICD-10-CM

## 2025-05-06 DIAGNOSIS — Z17.0 MALIGNANT NEOPLASM OF UNSPECIFIED SITE OF LEFT FEMALE BREAST: ICD-10-CM

## 2025-05-06 DIAGNOSIS — C50.912 MALIGNANT NEOPLASM OF UNSPECIFIED SITE OF LEFT FEMALE BREAST: ICD-10-CM

## 2025-05-06 DIAGNOSIS — Z79.811 LONG TERM (CURRENT) USE OF AROMATASE INHIBITORS: ICD-10-CM

## 2025-05-06 LAB
CANCER AG27-29 SERPL-ACNC: 36.1 U/ML
CEA SERPL-MCNC: 9.1 NG/ML

## 2025-05-06 PROCEDURE — 99214 OFFICE O/P EST MOD 30 MIN: CPT

## 2025-05-06 RX ORDER — ABEMACICLIB 100 MG/1
100 TABLET ORAL
Qty: 60 | Refills: 5 | Status: ACTIVE | COMMUNITY
Start: 2025-05-06 | End: 1900-01-01

## 2025-05-08 ENCOUNTER — TRANSCRIPTION ENCOUNTER (OUTPATIENT)
Age: 70
End: 2025-05-08